# Patient Record
Sex: FEMALE | Race: WHITE | NOT HISPANIC OR LATINO | Employment: OTHER | URBAN - METROPOLITAN AREA
[De-identification: names, ages, dates, MRNs, and addresses within clinical notes are randomized per-mention and may not be internally consistent; named-entity substitution may affect disease eponyms.]

---

## 2018-07-16 ENCOUNTER — INITIAL CONSULT (OUTPATIENT)
Dept: OPHTHALMOLOGY | Facility: CLINIC | Age: 68
End: 2018-07-16
Payer: MEDICARE

## 2018-07-16 DIAGNOSIS — Z94.7 TRANSPLANTED CORNEA: ICD-10-CM

## 2018-07-16 DIAGNOSIS — Q15.0 CONGENITAL GLAUCOMA OF BOTH EYES: Primary | ICD-10-CM

## 2018-07-16 DIAGNOSIS — Z97.0 EYE GLOBE PROSTHESIS: ICD-10-CM

## 2018-07-16 DIAGNOSIS — Z98.41 STATUS POST CATARACT EXTRACTION AND INSERTION OF INTRAOCULAR LENS OF RIGHT EYE: ICD-10-CM

## 2018-07-16 DIAGNOSIS — Z96.1 STATUS POST CATARACT EXTRACTION AND INSERTION OF INTRAOCULAR LENS OF RIGHT EYE: ICD-10-CM

## 2018-07-16 PROCEDURE — 76514 ECHO EXAM OF EYE THICKNESS: CPT | Mod: S$GLB,,, | Performed by: OPHTHALMOLOGY

## 2018-07-16 PROCEDURE — 92004 COMPRE OPH EXAM NEW PT 1/>: CPT | Mod: S$GLB,,, | Performed by: OPHTHALMOLOGY

## 2018-07-16 PROCEDURE — 92020 GONIOSCOPY: CPT | Mod: S$GLB,,, | Performed by: OPHTHALMOLOGY

## 2018-07-16 NOTE — PROGRESS NOTES
HPI     67 year old female  Pt recently moved from [place] and needs to establish care  Juvenile onset glaucoma  Bleb OD   Corneal transplant OD   IOL OD   Prosthesis OS-        Last edited by Rosanne Ornelas on 2018  2:40 PM. (History)            Assessment /Plan     For exam results, see Encounter Report.    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

## 2018-07-20 NOTE — PROGRESS NOTES
Assessment /Plan     For exam results, see Encounter Report.    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

## 2018-08-01 ENCOUNTER — OFFICE VISIT (OUTPATIENT)
Dept: OPHTHALMOLOGY | Facility: CLINIC | Age: 68
End: 2018-08-01
Payer: MEDICARE

## 2018-08-01 DIAGNOSIS — Z96.1 STATUS POST CATARACT EXTRACTION AND INSERTION OF INTRAOCULAR LENS OF RIGHT EYE: ICD-10-CM

## 2018-08-01 DIAGNOSIS — Z94.7 TRANSPLANTED CORNEA: ICD-10-CM

## 2018-08-01 DIAGNOSIS — Z97.0 EYE GLOBE PROSTHESIS: ICD-10-CM

## 2018-08-01 DIAGNOSIS — Z98.41 STATUS POST CATARACT EXTRACTION AND INSERTION OF INTRAOCULAR LENS OF RIGHT EYE: ICD-10-CM

## 2018-08-01 DIAGNOSIS — Q15.0 CONGENITAL GLAUCOMA OF BOTH EYES: Primary | ICD-10-CM

## 2018-08-01 PROCEDURE — 92012 INTRM OPH EXAM EST PATIENT: CPT | Mod: S$GLB,,, | Performed by: OPHTHALMOLOGY

## 2018-08-01 PROCEDURE — 99999 PR PBB SHADOW E&M-EST. PATIENT-LVL II: CPT | Mod: PBBFAC,,, | Performed by: OPHTHALMOLOGY

## 2018-08-01 RX ORDER — ACETAZOLAMIDE 250 MG/1
250 TABLET ORAL 4 TIMES DAILY
COMMUNITY
End: 2018-08-01 | Stop reason: SDUPTHER

## 2018-08-01 RX ORDER — ACETAZOLAMIDE 250 MG/1
250 TABLET ORAL 4 TIMES DAILY
Qty: 120 TABLET | Refills: 11 | Status: SHIPPED | OUTPATIENT
Start: 2018-08-01 | End: 2019-12-04

## 2018-08-01 RX ORDER — POTASSIUM CHLORIDE 750 MG/1
TABLET, EXTENDED RELEASE ORAL
COMMUNITY
Start: 2018-04-24 | End: 2019-05-29 | Stop reason: SDUPTHER

## 2018-08-07 NOTE — PROGRESS NOTES
Assessment /Plan     For exam results, see Encounter Report.    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

## 2018-08-20 ENCOUNTER — OFFICE VISIT (OUTPATIENT)
Dept: OPHTHALMOLOGY | Facility: CLINIC | Age: 68
End: 2018-08-20
Payer: MEDICARE

## 2018-08-20 DIAGNOSIS — Q15.0 CONGENITAL GLAUCOMA OF BOTH EYES: Primary | ICD-10-CM

## 2018-08-20 DIAGNOSIS — Z96.1 STATUS POST CATARACT EXTRACTION AND INSERTION OF INTRAOCULAR LENS OF RIGHT EYE: ICD-10-CM

## 2018-08-20 DIAGNOSIS — Z98.41 STATUS POST CATARACT EXTRACTION AND INSERTION OF INTRAOCULAR LENS OF RIGHT EYE: ICD-10-CM

## 2018-08-20 DIAGNOSIS — H40.1113 PRIMARY OPEN ANGLE GLAUCOMA (POAG) OF RIGHT EYE, SEVERE STAGE: Primary | ICD-10-CM

## 2018-08-20 DIAGNOSIS — Z94.7 TRANSPLANTED CORNEA: ICD-10-CM

## 2018-08-20 DIAGNOSIS — Z97.0 EYE GLOBE PROSTHESIS: ICD-10-CM

## 2018-08-20 PROCEDURE — 92012 INTRM OPH EXAM EST PATIENT: CPT | Mod: S$GLB,,, | Performed by: OPHTHALMOLOGY

## 2018-08-20 PROCEDURE — 99999 PR PBB SHADOW E&M-EST. PATIENT-LVL I: CPT | Mod: PBBFAC,,, | Performed by: OPHTHALMOLOGY

## 2018-08-20 RX ORDER — PILOCARPINE HYDROCHLORIDE 40 MG/ML
1 SOLUTION/ DROPS OPHTHALMIC 3 TIMES DAILY
Qty: 15 ML | Refills: 5 | Status: SHIPPED | OUTPATIENT
Start: 2018-08-20 | End: 2019-12-04

## 2018-08-20 RX ORDER — BRIMONIDINE TARTRATE 1 MG/ML
1 SOLUTION/ DROPS OPHTHALMIC 3 TIMES DAILY
Qty: 10 ML | Refills: 5 | Status: SHIPPED | OUTPATIENT
Start: 2018-08-20 | End: 2018-10-15

## 2018-08-20 RX ORDER — DORZOLAMIDE HYDROCHLORIDE AND TIMOLOL MALEATE 20; 5 MG/ML; MG/ML
1 SOLUTION/ DROPS OPHTHALMIC 2 TIMES DAILY
Qty: 10 ML | Refills: 5 | Status: SHIPPED | OUTPATIENT
Start: 2018-08-20 | End: 2019-09-23 | Stop reason: SDUPTHER

## 2018-08-20 RX ORDER — TRAVOPROST OPHTHALMIC SOLUTION 0.04 MG/ML
1 SOLUTION OPHTHALMIC NIGHTLY
Qty: 2.5 ML | Refills: 5 | Status: SHIPPED | OUTPATIENT
Start: 2018-08-20 | End: 2019-08-22 | Stop reason: SDUPTHER

## 2018-08-20 RX ORDER — FLUOROMETHOLONE 1 MG/ML
1 SUSPENSION/ DROPS OPHTHALMIC DAILY
Qty: 10 ML | Refills: 5 | Status: SHIPPED | OUTPATIENT
Start: 2018-08-20 | End: 2019-08-22 | Stop reason: SDUPTHER

## 2018-10-15 ENCOUNTER — OFFICE VISIT (OUTPATIENT)
Dept: OPHTHALMOLOGY | Facility: CLINIC | Age: 68
End: 2018-10-15
Payer: MEDICARE

## 2018-10-15 DIAGNOSIS — Z96.1 STATUS POST CATARACT EXTRACTION AND INSERTION OF INTRAOCULAR LENS OF RIGHT EYE: ICD-10-CM

## 2018-10-15 DIAGNOSIS — Z98.41 STATUS POST CATARACT EXTRACTION AND INSERTION OF INTRAOCULAR LENS OF RIGHT EYE: ICD-10-CM

## 2018-10-15 DIAGNOSIS — H40.1113 PRIMARY OPEN ANGLE GLAUCOMA (POAG) OF RIGHT EYE, SEVERE STAGE: ICD-10-CM

## 2018-10-15 DIAGNOSIS — Q15.0 CONGENITAL GLAUCOMA OF BOTH EYES: ICD-10-CM

## 2018-10-15 DIAGNOSIS — Z94.7 TRANSPLANTED CORNEA: ICD-10-CM

## 2018-10-15 DIAGNOSIS — Z97.0 EYE GLOBE PROSTHESIS: ICD-10-CM

## 2018-10-15 DIAGNOSIS — H40.31X3 GLAUCOMA OF RIGHT EYE ASSOCIATED WITH OCULAR TRAUMA, SEVERE STAGE: Primary | ICD-10-CM

## 2018-10-15 PROCEDURE — 99999 PR PBB SHADOW E&M-EST. PATIENT-LVL I: CPT | Mod: PBBFAC,,, | Performed by: OPHTHALMOLOGY

## 2018-10-15 PROCEDURE — 92012 INTRM OPH EXAM EST PATIENT: CPT | Mod: S$PBB,,, | Performed by: OPHTHALMOLOGY

## 2018-10-15 PROCEDURE — 99211 OFF/OP EST MAY X REQ PHY/QHP: CPT | Mod: PBBFAC,PO | Performed by: OPHTHALMOLOGY

## 2018-10-15 RX ORDER — BRIMONIDINE TARTRATE 1 MG/ML
1 SOLUTION/ DROPS OPHTHALMIC 3 TIMES DAILY
Qty: 45 ML | Refills: 11 | Status: SHIPPED | OUTPATIENT
Start: 2018-10-15 | End: 2020-01-20

## 2018-10-15 NOTE — PROGRESS NOTES
Assessment /Plan     For exam results, see Encounter Report.    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

## 2018-10-24 ENCOUNTER — TELEPHONE (OUTPATIENT)
Dept: OPHTHALMOLOGY | Facility: CLINIC | Age: 68
End: 2018-10-24

## 2018-10-24 NOTE — TELEPHONE ENCOUNTER
----- Message from Karissa Bean sent at 10/24/2018  9:50 AM CDT -----  Contact: Yahaira Hinson   Pt would like to speak with  nurse about the eye drop prescription,pt can be reached at [phone] please thank you.

## 2018-10-24 NOTE — TELEPHONE ENCOUNTER
Pt uses drops TID- informed pharmacy of change- pt will need override for next rx.   Informed pt to call for RX a few days prior to needing her drops and they will get the override.

## 2019-01-21 ENCOUNTER — OFFICE VISIT (OUTPATIENT)
Dept: OPHTHALMOLOGY | Facility: CLINIC | Age: 69
End: 2019-01-21
Payer: MEDICARE

## 2019-01-21 DIAGNOSIS — Q15.0 CONGENITAL GLAUCOMA OF BOTH EYES: ICD-10-CM

## 2019-01-21 DIAGNOSIS — Z98.41 STATUS POST CATARACT EXTRACTION AND INSERTION OF INTRAOCULAR LENS OF RIGHT EYE: ICD-10-CM

## 2019-01-21 DIAGNOSIS — Z94.7 TRANSPLANTED CORNEA: ICD-10-CM

## 2019-01-21 DIAGNOSIS — H40.31X3 GLAUCOMA OF RIGHT EYE ASSOCIATED WITH OCULAR TRAUMA, SEVERE STAGE: Primary | ICD-10-CM

## 2019-01-21 DIAGNOSIS — Z96.1 STATUS POST CATARACT EXTRACTION AND INSERTION OF INTRAOCULAR LENS OF RIGHT EYE: ICD-10-CM

## 2019-01-21 DIAGNOSIS — Z97.0 EYE GLOBE PROSTHESIS: ICD-10-CM

## 2019-01-21 PROCEDURE — 92012 INTRM OPH EXAM EST PATIENT: CPT | Mod: S$GLB,,, | Performed by: OPHTHALMOLOGY

## 2019-01-21 PROCEDURE — 99999 PR PBB SHADOW E&M-EST. PATIENT-LVL II: ICD-10-PCS | Mod: PBBFAC,,, | Performed by: OPHTHALMOLOGY

## 2019-01-21 PROCEDURE — 99999 PR PBB SHADOW E&M-EST. PATIENT-LVL II: CPT | Mod: PBBFAC,,, | Performed by: OPHTHALMOLOGY

## 2019-01-21 PROCEDURE — 92012 PR EYE EXAM, EST PATIENT,INTERMED: ICD-10-PCS | Mod: S$GLB,,, | Performed by: OPHTHALMOLOGY

## 2019-01-21 NOTE — PROGRESS NOTES
Assessment /Plan     For exam results, see Encounter Report.    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

## 2019-04-15 ENCOUNTER — OFFICE VISIT (OUTPATIENT)
Dept: OPHTHALMOLOGY | Facility: CLINIC | Age: 69
End: 2019-04-15
Payer: MEDICARE

## 2019-04-15 DIAGNOSIS — Q15.0 CONGENITAL GLAUCOMA OF BOTH EYES: ICD-10-CM

## 2019-04-15 DIAGNOSIS — Z97.0 EYE GLOBE PROSTHESIS: ICD-10-CM

## 2019-04-15 DIAGNOSIS — Z96.1 STATUS POST CATARACT EXTRACTION AND INSERTION OF INTRAOCULAR LENS OF RIGHT EYE: ICD-10-CM

## 2019-04-15 DIAGNOSIS — Z98.41 STATUS POST CATARACT EXTRACTION AND INSERTION OF INTRAOCULAR LENS OF RIGHT EYE: ICD-10-CM

## 2019-04-15 DIAGNOSIS — Z94.7 TRANSPLANTED CORNEA: ICD-10-CM

## 2019-04-15 DIAGNOSIS — H40.31X3 GLAUCOMA OF RIGHT EYE ASSOCIATED WITH OCULAR TRAUMA, SEVERE STAGE: Primary | ICD-10-CM

## 2019-04-15 PROCEDURE — 99999 PR PBB SHADOW E&M-EST. PATIENT-LVL I: CPT | Mod: PBBFAC,,, | Performed by: OPHTHALMOLOGY

## 2019-04-15 PROCEDURE — 99999 PR PBB SHADOW E&M-EST. PATIENT-LVL I: ICD-10-PCS | Mod: PBBFAC,,, | Performed by: OPHTHALMOLOGY

## 2019-04-15 PROCEDURE — 92012 INTRM OPH EXAM EST PATIENT: CPT | Mod: S$GLB,,, | Performed by: OPHTHALMOLOGY

## 2019-04-15 PROCEDURE — 92012 PR EYE EXAM, EST PATIENT,INTERMED: ICD-10-PCS | Mod: S$GLB,,, | Performed by: OPHTHALMOLOGY

## 2019-04-15 RX ORDER — AMOXICILLIN 875 MG/1
TABLET, FILM COATED ORAL
Refills: 0 | COMMUNITY
Start: 2019-01-26 | End: 2019-04-29

## 2019-04-15 RX ORDER — CHLOPHEDIANOL HYDROCHLORIDE, DEXBROMPHENIRAMINE MALEATE 12.5; 1 MG/5ML; MG/5ML
LIQUID ORAL
Refills: 0 | COMMUNITY
Start: 2019-01-26 | End: 2019-04-29

## 2019-04-15 NOTE — PROGRESS NOTES
Assessment /Plan     For exam results, see Encounter Report.    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

## 2019-04-18 NOTE — PROGRESS NOTES
Assessment /Plan     For exam results, see Encounter Report.    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    Traumatic Glaucoma OD  Congenital Glaucoma  Prosthesis OS    Mid-low teens    CCT  530    Right --> patient recipe  Cosopt TID  Alphagan TID  Giovani TID   Beni q day  FML 5 days / week     Diamox --> Intol Hypo K --> now on K suppl -->  D 250 BID --> MTMT  Tolerated well in past     --> re-consider M laser or IN tube OD as re-discussed  --> used eye model for IN  :: Held  --> patient tending towards MP3 with IOP in teens / low 20's  --> if IOP Mid - Hi 20's --> reconsider     PC IOL OD  Quiet    Monocular precautions      Plan  RTC 2 months IOP --> WB  RTC sooner prn with good understanding

## 2019-04-29 ENCOUNTER — OFFICE VISIT (OUTPATIENT)
Dept: OPHTHALMOLOGY | Facility: CLINIC | Age: 69
End: 2019-04-29
Payer: MEDICARE

## 2019-04-29 DIAGNOSIS — Z94.7 TRANSPLANTED CORNEA: ICD-10-CM

## 2019-04-29 DIAGNOSIS — Z97.0 EYE GLOBE PROSTHESIS: ICD-10-CM

## 2019-04-29 DIAGNOSIS — Q15.0 CONGENITAL GLAUCOMA OF BOTH EYES: ICD-10-CM

## 2019-04-29 DIAGNOSIS — Z96.1 STATUS POST CATARACT EXTRACTION AND INSERTION OF INTRAOCULAR LENS OF RIGHT EYE: ICD-10-CM

## 2019-04-29 DIAGNOSIS — H40.31X3 GLAUCOMA OF RIGHT EYE ASSOCIATED WITH OCULAR TRAUMA, SEVERE STAGE: Primary | ICD-10-CM

## 2019-04-29 DIAGNOSIS — Z98.41 STATUS POST CATARACT EXTRACTION AND INSERTION OF INTRAOCULAR LENS OF RIGHT EYE: ICD-10-CM

## 2019-04-29 PROCEDURE — 92012 PR EYE EXAM, EST PATIENT,INTERMED: ICD-10-PCS | Mod: S$GLB,,, | Performed by: OPHTHALMOLOGY

## 2019-04-29 PROCEDURE — 99999 PR PBB SHADOW E&M-EST. PATIENT-LVL III: CPT | Mod: PBBFAC,,, | Performed by: OPHTHALMOLOGY

## 2019-04-29 PROCEDURE — 99999 PR PBB SHADOW E&M-EST. PATIENT-LVL III: ICD-10-PCS | Mod: PBBFAC,,, | Performed by: OPHTHALMOLOGY

## 2019-04-29 PROCEDURE — 92012 INTRM OPH EXAM EST PATIENT: CPT | Mod: S$GLB,,, | Performed by: OPHTHALMOLOGY

## 2019-05-28 NOTE — H&P (VIEW-ONLY)
This note was created by combination of typed  and Dragon dictation.  Transcription errors may be present.  If there are any questions, please contact me.    Assessment / Plan:   Normal physical exam  -ROSALEE for colonoscopy, ROSALEE to previous PCP for immunizations. ROSALEE to GI for hx of HCV testing  -     Comprehensive metabolic panel; Future; Expected date: 05/29/2019  -     Lipid panel; Future; Expected date: 05/29/2019    Pure hypercholesterolemia  -check labs on lipitor 10.  -     Comprehensive metabolic panel; Future; Expected date: 05/29/2019  -     Lipid panel; Future; Expected date: 05/29/2019  -     atorvastatin (LIPITOR) 10 MG tablet; Take 1 tablet (10 mg total) by mouth once daily.  Dispense: 90 tablet; Refill: 3    Essential hypertension  -stable on tenoretic and K Dur. Not requesting RF today.  -     atenolol-chlorthalidone (TENORETIC) 50-25 mg Tab; Take 1 tablet by mouth once daily.  Dispense: 90 tablet; Refill: 3  -     potassium chloride (KLOR-CON) 10 MEQ TbSR; Take 1 tablet (10 mEq total) by mouth once daily.  Dispense: 90 tablet; Refill: 3    Status post cholecystectomy 1998 still gets bile sludge in the bile duct; Dr. Ca  -ROSALEE for colonoscopy, HCV testing/screening    Thalassemia, unspecified type  -CBC done 9/2018 outside records. Not repeated today.    Medications Discontinued During This Encounter   Medication Reason    atenolol-chlorthalidone (TENORETIC) 50-25 mg Tab Reorder    atorvastatin (LIPITOR) 10 MG tablet Reorder    potassium chloride (KLOR-CON) 10 MEQ TbSR Reorder       meds sent this encounter:  Medications Ordered This Encounter   Medications    atenolol-chlorthalidone (TENORETIC) 50-25 mg Tab     Sig: Take 1 tablet by mouth once daily.     Dispense:  90 tablet     Refill:  3    atorvastatin (LIPITOR) 10 MG tablet     Sig: Take 1 tablet (10 mg total) by mouth once daily.     Dispense:  90 tablet     Refill:  3    potassium chloride (KLOR-CON) 10 MEQ TbSR     Sig: Take 1  tablet (10 mEq total) by mouth once daily.     Dispense:  90 tablet     Refill:  3       Follow Up: No follow-ups on file. If labs good PEx 1 year. If change statin dose repeat labs 6 months lab only.      Subjective:     Chief Complaint   Patient presents with    Annual Exam       HPI  Mariela is a 68 y.o. female, last appointment with this clinic was Visit date not found.    No LMP recorded. Patient is postmenopausal.    New patient formerly Dr. Ziegler  Gyn Dr. Ralph  Glaucoma Dr. Beaver    Hypertension  Hyperlipidemia; labs outside done 9/2018 was high she does not think she was taking statin at that time. Has been taking her statin since. Low dose statin.  Hx of taty but still gets bile duct sludge.  Dr. Ca.  Colonoscopy age 61 reportedly diverticulosis.  Hx of thalassemia - unsure if alpha or beta.  Thinks she had HCV screening as part of initial workup that ultimately diagnosed the bile duct sludge    10/15/2018 metabolic panel ALT 36 creatinine 1.09  CBC 4.8/11.2/35.0/233 MCV 66 RDW 15  09/22/2018   HDL 87   A1c 5.4  Urinalysis unremarkable  11/2015 DXA normal BMD    Patient Care Team:  Raul Adkins MD as PCP - General (Internal Medicine)  Temi Ziegler MD as Consulting Physician (Internal Medicine)  Doroteo Beaver MD as Consulting Physician (Ophthalmology)  Jeremias Ca MD as Consulting Physician (Gastroenterology)  Georgie Ralph MD as Obstetrician (Obstetrics and Gynecology)    Patient Active Problem List    Diagnosis Date Noted    Pure hypercholesterolemia 05/29/2019    Essential hypertension 05/29/2019    Status post cholecystectomy 1998 still gets bile sludge in the bile duct; Dr. 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/16/2018     Left eye      Status post cataract extraction and insertion  of intraocular lens of right eye 2018    Transplanted cornea 2018     Right eye         PAST MEDICAL HISTORY:  Past Medical History:   Diagnosis Date    Cataract     Glaucoma (increased eye pressure)     Hypertension        PAST SURGICAL HISTORY:  Past Surgical History:   Procedure Laterality Date    CATARACT EXTRACTION Right     about     CHOLECYSTECTOMY      DC REMOVAL OF OVARY/TUBE(S)       Family History   Problem Relation Age of Onset    Asthma Father     Arthritis Father     Ovarian cancer Mother     Hypertension Sister     No Known Problems Brother     Breast cancer Maternal Grandmother     No Known Problems Brother     No Known Problems Brother     Colon cancer Neg Hx        SOCIAL HISTORY:  Social History     Socioeconomic History    Marital status:      Spouse name: Not on file    Number of children: Not on file    Years of education: Not on file    Highest education level: Not on file   Occupational History    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   Social Needs    Financial resource strain: Not on file    Food insecurity:     Worry: Not on file     Inability: Not on file    Transportation needs:     Medical: Not on file     Non-medical: Not on file   Tobacco Use    Smoking status: Former Smoker     Packs/day: 0.30     Years: 11.00     Pack years: 3.30     Types: Cigarettes     Last attempt to quit: 2007     Years since quittin.8    Smokeless tobacco: Never Used   Substance and Sexual Activity    Alcohol use: Yes     Binge frequency: Weekly     Comment: social drinker    Drug use: No    Sexual activity: Yes     Partners: Male     Birth control/protection: Post-menopausal   Lifestyle    Physical activity:     Days per week: Not on file     Minutes per session: Not on file    Stress: Not on file   Relationships    Social connections:     Talks on phone: Not on file     Gets together:  Not on file     Attends Jew service: Not on file     Active member of club or organization: Not on file     Attends meetings of clubs or organizations: Not on file     Relationship status: Not on file   Other Topics Concern    Not on file   Social History Narrative    Not on file        ALLERGIES AND MEDICATIONS: updated and reviewed.  Review of patient's allergies indicates:  No Known Allergies  Current Outpatient Medications   Medication Sig Dispense Refill    acetaZOLAMIDE (DIAMOX) 250 MG tablet Take 1 tablet (250 mg total) by mouth 4 (four) times daily. 120 tablet 11    atenolol-chlorthalidone (TENORETIC) 50-25 mg Tab Take 1 tablet by mouth once daily. 90 tablet 3    atorvastatin (LIPITOR) 10 MG tablet Take 1 tablet (10 mg total) by mouth once daily. 90 tablet 3    brimonidine 0.1% (ALPHAGAN P) 0.1 % Drop Place 1 drop into the right eye 3 (three) times daily. 45 mL 11    dorzolamide-timolol 2-0.5% (COSOPT) 22.3-6.8 mg/mL ophthalmic solution Place 1 drop into the right eye 2 (two) times daily. 10 mL 5    fluorometholone 0.1% (FML) 0.1 % DrpS Place 1 drop into the right eye once daily. 10 mL 5    pilocarpine HCL 4% (PILOCAR) 4 % ophthalmic solution Place 1 drop into the right eye 3 (three) times daily. 15 mL 5    potassium chloride (KLOR-CON) 10 MEQ TbSR Take 1 tablet (10 mEq total) by mouth once daily. 90 tablet 3    travoprost (TRAVATAN Z) 0.004 % ophthalmic solution Place 1 drop into the right eye every evening. 2.5 mL 5     No current facility-administered medications for this visit.        Review of Systems   Constitutional: Negative for fever, malaise/fatigue and weight loss.   HENT: Negative for congestion.    Eyes: Negative for blurred vision and pain.   Respiratory: Negative for shortness of breath and wheezing.    Cardiovascular: Negative for chest pain, palpitations and leg swelling.   Gastrointestinal: Negative for abdominal pain, blood in stool, constipation, diarrhea and heartburn.  "  Genitourinary: Negative for dysuria, hematuria and urgency.   Musculoskeletal: Negative for joint pain.   Neurological: Negative for tingling, focal weakness, weakness and headaches.       Objective:   Physical Exam   Vitals:    05/29/19 0823   BP: 130/66   BP Location: Left arm   Patient Position: Sitting   BP Method: Medium (Manual)   Pulse: 66   Temp: 97.9 °F (36.6 °C)   TempSrc: Oral   SpO2: 99%   Weight: 66.2 kg (146 lb)   Height: 5' 2" (1.575 m)    Body mass index is 26.7 kg/m².  Weight: 66.2 kg (146 lb)   Height: 5' 2" (157.5 cm)     Physical Exam   Constitutional: She is oriented to person, place, and time. She appears well-developed and well-nourished.   HENT:   Right Ear: Tympanic membrane, external ear and ear canal normal.   Left Ear: Tympanic membrane, external ear and ear canal normal.   Mouth/Throat: Oropharynx is clear and moist.   Eyes: No scleral icterus.   Left eye prosthesis   Neck: Neck supple. No thyromegaly present.   Cardiovascular: Normal rate, regular rhythm and normal heart sounds.   No murmur heard.  Pulmonary/Chest: Effort normal and breath sounds normal. She has no wheezes.   Abdominal: Soft. She exhibits no mass. There is no splenomegaly or hepatomegaly. There is no tenderness.   Musculoskeletal: Normal range of motion. She exhibits no edema or deformity.   Lymphadenopathy:     She has no cervical adenopathy.   Neurological: She is alert and oriented to person, place, and time. She has normal reflexes.   Skin: Skin is warm and dry. No rash noted.   On exposed skin   Psychiatric: She has a normal mood and affect. Her behavior is normal. Judgment and thought content normal.     "

## 2019-05-28 NOTE — PROGRESS NOTES
This note was created by combination of typed  and Dragon dictation.  Transcription errors may be present.  If there are any questions, please contact me.    Assessment / Plan:   Normal physical exam  -ROSALEE for colonoscopy, ROSALEE to previous PCP for immunizations. ROSALEE to GI for hx of HCV testing  -     Comprehensive metabolic panel; Future; Expected date: 05/29/2019  -     Lipid panel; Future; Expected date: 05/29/2019    Pure hypercholesterolemia  -check labs on lipitor 10.  -     Comprehensive metabolic panel; Future; Expected date: 05/29/2019  -     Lipid panel; Future; Expected date: 05/29/2019  -     atorvastatin (LIPITOR) 10 MG tablet; Take 1 tablet (10 mg total) by mouth once daily.  Dispense: 90 tablet; Refill: 3    Essential hypertension  -stable on tenoretic and K Dur. Not requesting RF today.  -     atenolol-chlorthalidone (TENORETIC) 50-25 mg Tab; Take 1 tablet by mouth once daily.  Dispense: 90 tablet; Refill: 3  -     potassium chloride (KLOR-CON) 10 MEQ TbSR; Take 1 tablet (10 mEq total) by mouth once daily.  Dispense: 90 tablet; Refill: 3    Status post cholecystectomy 1998 still gets bile sludge in the bile duct; Dr. Ca  -ROSALEE for colonoscopy, HCV testing/screening    Thalassemia, unspecified type  -CBC done 9/2018 outside records. Not repeated today.    Medications Discontinued During This Encounter   Medication Reason    atenolol-chlorthalidone (TENORETIC) 50-25 mg Tab Reorder    atorvastatin (LIPITOR) 10 MG tablet Reorder    potassium chloride (KLOR-CON) 10 MEQ TbSR Reorder       meds sent this encounter:  Medications Ordered This Encounter   Medications    atenolol-chlorthalidone (TENORETIC) 50-25 mg Tab     Sig: Take 1 tablet by mouth once daily.     Dispense:  90 tablet     Refill:  3    atorvastatin (LIPITOR) 10 MG tablet     Sig: Take 1 tablet (10 mg total) by mouth once daily.     Dispense:  90 tablet     Refill:  3    potassium chloride (KLOR-CON) 10 MEQ TbSR     Sig: Take 1  tablet (10 mEq total) by mouth once daily.     Dispense:  90 tablet     Refill:  3       Follow Up: No follow-ups on file. If labs good PEx 1 year. If change statin dose repeat labs 6 months lab only.      Subjective:     Chief Complaint   Patient presents with    Annual Exam       HPI  Mariela is a 68 y.o. female, last appointment with this clinic was Visit date not found.    No LMP recorded. Patient is postmenopausal.    New patient formerly Dr. Ziegler  Gyn Dr. Ralph  Glaucoma Dr. Beaver    Hypertension  Hyperlipidemia; labs outside done 9/2018 was high she does not think she was taking statin at that time. Has been taking her statin since. Low dose statin.  Hx of taty but still gets bile duct sludge.  Dr. Ca.  Colonoscopy age 61 reportedly diverticulosis.  Hx of thalassemia - unsure if alpha or beta.  Thinks she had HCV screening as part of initial workup that ultimately diagnosed the bile duct sludge    10/15/2018 metabolic panel ALT 36 creatinine 1.09  CBC 4.8/11.2/35.0/233 MCV 66 RDW 15  09/22/2018   HDL 87   A1c 5.4  Urinalysis unremarkable  11/2015 DXA normal BMD    Patient Care Team:  Raul Adkins MD as PCP - General (Internal Medicine)  Temi Ziegler MD as Consulting Physician (Internal Medicine)  Doroteo Beaver MD as Consulting Physician (Ophthalmology)  Jeremias Ca MD as Consulting Physician (Gastroenterology)  Georgie Ralph MD as Obstetrician (Obstetrics and Gynecology)    Patient Active Problem List    Diagnosis Date Noted    Pure hypercholesterolemia 05/29/2019    Essential hypertension 05/29/2019    Status post cholecystectomy 1998 still gets bile sludge in the bile duct; Dr. 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/16/2018     Left eye      Status post cataract extraction and insertion  of intraocular lens of right eye 2018    Transplanted cornea 2018     Right eye         PAST MEDICAL HISTORY:  Past Medical History:   Diagnosis Date    Cataract     Glaucoma (increased eye pressure)     Hypertension        PAST SURGICAL HISTORY:  Past Surgical History:   Procedure Laterality Date    CATARACT EXTRACTION Right     about     CHOLECYSTECTOMY      TN REMOVAL OF OVARY/TUBE(S)       Family History   Problem Relation Age of Onset    Asthma Father     Arthritis Father     Ovarian cancer Mother     Hypertension Sister     No Known Problems Brother     Breast cancer Maternal Grandmother     No Known Problems Brother     No Known Problems Brother     Colon cancer Neg Hx        SOCIAL HISTORY:  Social History     Socioeconomic History    Marital status:      Spouse name: Not on file    Number of children: Not on file    Years of education: Not on file    Highest education level: Not on file   Occupational History    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   Social Needs    Financial resource strain: Not on file    Food insecurity:     Worry: Not on file     Inability: Not on file    Transportation needs:     Medical: Not on file     Non-medical: Not on file   Tobacco Use    Smoking status: Former Smoker     Packs/day: 0.30     Years: 11.00     Pack years: 3.30     Types: Cigarettes     Last attempt to quit: 2007     Years since quittin.8    Smokeless tobacco: Never Used   Substance and Sexual Activity    Alcohol use: Yes     Binge frequency: Weekly     Comment: social drinker    Drug use: No    Sexual activity: Yes     Partners: Male     Birth control/protection: Post-menopausal   Lifestyle    Physical activity:     Days per week: Not on file     Minutes per session: Not on file    Stress: Not on file   Relationships    Social connections:     Talks on phone: Not on file     Gets together:  Not on file     Attends Latter-day service: Not on file     Active member of club or organization: Not on file     Attends meetings of clubs or organizations: Not on file     Relationship status: Not on file   Other Topics Concern    Not on file   Social History Narrative    Not on file        ALLERGIES AND MEDICATIONS: updated and reviewed.  Review of patient's allergies indicates:  No Known Allergies  Current Outpatient Medications   Medication Sig Dispense Refill    acetaZOLAMIDE (DIAMOX) 250 MG tablet Take 1 tablet (250 mg total) by mouth 4 (four) times daily. 120 tablet 11    atenolol-chlorthalidone (TENORETIC) 50-25 mg Tab Take 1 tablet by mouth once daily. 90 tablet 3    atorvastatin (LIPITOR) 10 MG tablet Take 1 tablet (10 mg total) by mouth once daily. 90 tablet 3    brimonidine 0.1% (ALPHAGAN P) 0.1 % Drop Place 1 drop into the right eye 3 (three) times daily. 45 mL 11    dorzolamide-timolol 2-0.5% (COSOPT) 22.3-6.8 mg/mL ophthalmic solution Place 1 drop into the right eye 2 (two) times daily. 10 mL 5    fluorometholone 0.1% (FML) 0.1 % DrpS Place 1 drop into the right eye once daily. 10 mL 5    pilocarpine HCL 4% (PILOCAR) 4 % ophthalmic solution Place 1 drop into the right eye 3 (three) times daily. 15 mL 5    potassium chloride (KLOR-CON) 10 MEQ TbSR Take 1 tablet (10 mEq total) by mouth once daily. 90 tablet 3    travoprost (TRAVATAN Z) 0.004 % ophthalmic solution Place 1 drop into the right eye every evening. 2.5 mL 5     No current facility-administered medications for this visit.        Review of Systems   Constitutional: Negative for fever, malaise/fatigue and weight loss.   HENT: Negative for congestion.    Eyes: Negative for blurred vision and pain.   Respiratory: Negative for shortness of breath and wheezing.    Cardiovascular: Negative for chest pain, palpitations and leg swelling.   Gastrointestinal: Negative for abdominal pain, blood in stool, constipation, diarrhea and heartburn.  "  Genitourinary: Negative for dysuria, hematuria and urgency.   Musculoskeletal: Negative for joint pain.   Neurological: Negative for tingling, focal weakness, weakness and headaches.       Objective:   Physical Exam   Vitals:    05/29/19 0823   BP: 130/66   BP Location: Left arm   Patient Position: Sitting   BP Method: Medium (Manual)   Pulse: 66   Temp: 97.9 °F (36.6 °C)   TempSrc: Oral   SpO2: 99%   Weight: 66.2 kg (146 lb)   Height: 5' 2" (1.575 m)    Body mass index is 26.7 kg/m².  Weight: 66.2 kg (146 lb)   Height: 5' 2" (157.5 cm)     Physical Exam   Constitutional: She is oriented to person, place, and time. She appears well-developed and well-nourished.   HENT:   Right Ear: Tympanic membrane, external ear and ear canal normal.   Left Ear: Tympanic membrane, external ear and ear canal normal.   Mouth/Throat: Oropharynx is clear and moist.   Eyes: No scleral icterus.   Left eye prosthesis   Neck: Neck supple. No thyromegaly present.   Cardiovascular: Normal rate, regular rhythm and normal heart sounds.   No murmur heard.  Pulmonary/Chest: Effort normal and breath sounds normal. She has no wheezes.   Abdominal: Soft. She exhibits no mass. There is no splenomegaly or hepatomegaly. There is no tenderness.   Musculoskeletal: Normal range of motion. She exhibits no edema or deformity.   Lymphadenopathy:     She has no cervical adenopathy.   Neurological: She is alert and oriented to person, place, and time. She has normal reflexes.   Skin: Skin is warm and dry. No rash noted.   On exposed skin   Psychiatric: She has a normal mood and affect. Her behavior is normal. Judgment and thought content normal.     "

## 2019-05-29 ENCOUNTER — LAB VISIT (OUTPATIENT)
Dept: LAB | Facility: HOSPITAL | Age: 69
End: 2019-05-29
Attending: INTERNAL MEDICINE
Payer: MEDICARE

## 2019-05-29 ENCOUNTER — OFFICE VISIT (OUTPATIENT)
Dept: FAMILY MEDICINE | Facility: CLINIC | Age: 69
End: 2019-05-29
Payer: MEDICARE

## 2019-05-29 VITALS
BODY MASS INDEX: 26.87 KG/M2 | HEART RATE: 66 BPM | DIASTOLIC BLOOD PRESSURE: 66 MMHG | WEIGHT: 146 LBS | HEIGHT: 62 IN | TEMPERATURE: 98 F | SYSTOLIC BLOOD PRESSURE: 130 MMHG | OXYGEN SATURATION: 99 %

## 2019-05-29 DIAGNOSIS — E78.00 PURE HYPERCHOLESTEROLEMIA: ICD-10-CM

## 2019-05-29 DIAGNOSIS — D56.9 THALASSEMIA, UNSPECIFIED TYPE: ICD-10-CM

## 2019-05-29 DIAGNOSIS — I10 ESSENTIAL HYPERTENSION: ICD-10-CM

## 2019-05-29 DIAGNOSIS — Z00.00 NORMAL PHYSICAL EXAM: Primary | ICD-10-CM

## 2019-05-29 DIAGNOSIS — Z00.00 NORMAL PHYSICAL EXAM: ICD-10-CM

## 2019-05-29 DIAGNOSIS — Z90.49 STATUS POST CHOLECYSTECTOMY: ICD-10-CM

## 2019-05-29 LAB
ALBUMIN SERPL BCP-MCNC: 4.2 G/DL (ref 3.5–5.2)
ALP SERPL-CCNC: 140 U/L (ref 55–135)
ALT SERPL W/O P-5'-P-CCNC: 42 U/L (ref 10–44)
ANION GAP SERPL CALC-SCNC: 12 MMOL/L (ref 8–16)
AST SERPL-CCNC: 36 U/L (ref 10–40)
BILIRUB SERPL-MCNC: 0.6 MG/DL (ref 0.1–1)
BUN SERPL-MCNC: 25 MG/DL (ref 8–23)
CALCIUM SERPL-MCNC: 10.3 MG/DL (ref 8.7–10.5)
CHLORIDE SERPL-SCNC: 104 MMOL/L (ref 95–110)
CHOLEST SERPL-MCNC: 195 MG/DL (ref 120–199)
CHOLEST/HDLC SERPL: 2.6 {RATIO} (ref 2–5)
CO2 SERPL-SCNC: 26 MMOL/L (ref 23–29)
CREAT SERPL-MCNC: 1 MG/DL (ref 0.5–1.4)
EST. GFR  (AFRICAN AMERICAN): >60 ML/MIN/1.73 M^2
EST. GFR  (NON AFRICAN AMERICAN): 58.4 ML/MIN/1.73 M^2
GLUCOSE SERPL-MCNC: 115 MG/DL (ref 70–110)
HDLC SERPL-MCNC: 76 MG/DL (ref 40–75)
HDLC SERPL: 39 % (ref 20–50)
LDLC SERPL CALC-MCNC: 94.2 MG/DL (ref 63–159)
NONHDLC SERPL-MCNC: 119 MG/DL
POTASSIUM SERPL-SCNC: 3.7 MMOL/L (ref 3.5–5.1)
PROT SERPL-MCNC: 7.9 G/DL (ref 6–8.4)
SODIUM SERPL-SCNC: 142 MMOL/L (ref 136–145)
TRIGL SERPL-MCNC: 124 MG/DL (ref 30–150)

## 2019-05-29 PROCEDURE — 99999 PR PBB SHADOW E&M-EST. PATIENT-LVL III: CPT | Mod: PBBFAC,,, | Performed by: INTERNAL MEDICINE

## 2019-05-29 PROCEDURE — 80061 LIPID PANEL: CPT

## 2019-05-29 PROCEDURE — 99203 OFFICE O/P NEW LOW 30 MIN: CPT | Mod: S$GLB,,, | Performed by: INTERNAL MEDICINE

## 2019-05-29 PROCEDURE — 3075F SYST BP GE 130 - 139MM HG: CPT | Mod: CPTII,S$GLB,, | Performed by: INTERNAL MEDICINE

## 2019-05-29 PROCEDURE — 99999 PR PBB SHADOW E&M-EST. PATIENT-LVL III: ICD-10-PCS | Mod: PBBFAC,,, | Performed by: INTERNAL MEDICINE

## 2019-05-29 PROCEDURE — 3078F PR MOST RECENT DIASTOLIC BLOOD PRESSURE < 80 MM HG: ICD-10-PCS | Mod: CPTII,S$GLB,, | Performed by: INTERNAL MEDICINE

## 2019-05-29 PROCEDURE — 3078F DIAST BP <80 MM HG: CPT | Mod: CPTII,S$GLB,, | Performed by: INTERNAL MEDICINE

## 2019-05-29 PROCEDURE — 80053 COMPREHEN METABOLIC PANEL: CPT

## 2019-05-29 PROCEDURE — 99203 PR OFFICE/OUTPT VISIT, NEW, LEVL III, 30-44 MIN: ICD-10-PCS | Mod: S$GLB,,, | Performed by: INTERNAL MEDICINE

## 2019-05-29 PROCEDURE — 3075F PR MOST RECENT SYSTOLIC BLOOD PRESS GE 130-139MM HG: ICD-10-PCS | Mod: CPTII,S$GLB,, | Performed by: INTERNAL MEDICINE

## 2019-05-29 PROCEDURE — 36415 COLL VENOUS BLD VENIPUNCTURE: CPT | Mod: PO

## 2019-05-29 RX ORDER — ATENOLOL AND CHLORTHALIDONE TABLET 50; 25 MG/1; MG/1
1 TABLET ORAL DAILY
Qty: 90 TABLET | Refills: 3
Start: 2019-05-29 | End: 2019-07-25 | Stop reason: SDUPTHER

## 2019-05-29 RX ORDER — POTASSIUM CHLORIDE 750 MG/1
10 TABLET, EXTENDED RELEASE ORAL DAILY
Qty: 90 TABLET | Refills: 3
Start: 2019-05-29 | End: 2019-07-16 | Stop reason: SDUPTHER

## 2019-05-29 RX ORDER — ATORVASTATIN CALCIUM 10 MG/1
10 TABLET, FILM COATED ORAL DAILY
Qty: 90 TABLET | Refills: 3
Start: 2019-05-29 | End: 2019-07-16 | Stop reason: SDUPTHER

## 2019-05-29 NOTE — PROGRESS NOTES
Creatinine 1.0, possibly CKD stage 3, no previous for comparison except for 5 years previous.  Lipid profile good on low-dose atorvastatin no change.  Await old records to compare creatinine, history vaccinations, history of colonoscopy, history of hepatitis C screening  Results to patient via my [facility].  No change in regimen.  Plan for physical exam 1 year.

## 2019-05-29 NOTE — LETTER
May 29, 2019    Dr. Roby LEIVA             [address]  [place] [address]  Phone: [phone]  Fax: [phone] May 29, 2019     Patient: Yahaira Hinson    YOB: 1950   Date of Visit: 5/29/2019     To Whom It May Concern:    We are seeing Yahaira Hinson in the clinic at [facility].  Raul Adkins MD is the patient's PCP.  She/He has an outstanding lab/procedure at the time we reviewed his/her chart.  To help with our Health Maintenance records will you please supply the following report(s)       (  )  MAMMOGRAM                                             ( x )  COLONOSCOPY      (  )  PAP SMEAR                                                 ( x )  OUTSIDE LAB RESULTS     (  )  DEXA SCAN                                                 (  )  EYE EXAM            (  )  FOOT EXAM                                                 (  )  ENTIRE RECORD     (  )  OUTSIDE IMMUNIZATIONS                        (x  )  Hepatitis C testing_         Please Fax to [facility], [address], [phone]     If any questions please contact Gisselle at [phone]

## 2019-05-29 NOTE — LETTER
May 29, 2019    Dr. LORETTA Ziegler               [address]  [place] [address]  Phone: [phone]  Fax: [phone] May 29, 2019     Patient: Yahaira Hinson    YOB: 1950   Date of Visit: 5/29/2019     To Whom It May Concern:    We are seeing Yahaira Hinson in the clinic at [facility].  Raul Adkins MD (not Dr. Neves) is the patient's PCP.  She/He has an outstanding lab/procedure at the time we reviewed his/her chart.  To help with our Health Maintenance records will you please supply the following report(s)       (  )  MAMMOGRAM                                             (  )  COLONOSCOPY      (  )  PAP SMEAR                                                 (  )  OUTSIDE LAB RESULTS     (  )  DEXA SCAN                                                 (  )  EYE EXAM            (  )  FOOT EXAM                                                 (x  )  ENTIRE RECORD     (  )  OUTSIDE IMMUNIZATIONS                        (  )  _______________         Please Fax to [facility], [place] [address], [phone]     If any questions please contact Gisselle at [phone]

## 2019-05-29 NOTE — LETTER
May 29, 2019    Dr. LORETTA Ziegler               [facility]  [address]  [place] [address]  Phone: [phone]  Fax: [phone] May 29, 2019     Patient: Yahaira Hinson    YOB: 1950   Date of Visit: 5/29/2019     To Whom It May Concern:    We are seeing Yahaira Hinson in the clinic at [facility].  Jordon Neves MD is the patient's PCP.  She/He has an outstanding lab/procedure at the time we reviewed his/her chart.  To help with our Health Maintenance records will you please supply the following report(s)       (  )  MAMMOGRAM                                             (  )  COLONOSCOPY      (  )  PAP SMEAR                                                 (  )  OUTSIDE LAB RESULTS     (  )  DEXA SCAN                                                 (  )  EYE EXAM            (  )  FOOT EXAM                                                 (x  )  ENTIRE RECORD     (  )  OUTSIDE IMMUNIZATIONS                        (  )  _______________         Please Fax to [facility], [place], [phone]     If any questions please contact Gisselle at [phone]

## 2019-06-06 NOTE — PROGRESS NOTES
Assessment /Plan     For exam results, see Encounter Report.    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    Traumatic Glaucoma OD  Congenital Glaucoma  Prosthesis OS    Mid-low teens    CCT  530    Right --> patient recipe  Cosopt TID  Alphagan TID  Giovani TID   Beni q day  FML 5 days / week     Diamox --> Intol Hypo K --> now on K suppl -->  D 250 BID --> MTMT  Tolerated well in past     --> re-consider M laser or IN tube OD as re-discussed  --> used eye model for IN  :: Held  --> patient tending towards MP3 with IOP in teens / low 20's  --> if IOP Mid - Hi 20's --> reconsider     PC IOL OD  Quiet    Monocular precautions    Right eye MP3 / G6 Laser  Glaucoma Incisional Surgery Consent: Patient with poorly controlled glaucoma on MTMT with IOP deemed too high for the health of the eye.  Discussed with patient options, risks and benefits, expectations of glaucoma surgery  with questions and answers to facilitate discussion.  The patient voice good understanding and patient wishes to proceed with surgery.  The patient will likely benefit from surgery and patient signed consent for Right Eye.    Plan  RTC 1-2 weeks MP3 / G6 laser OD --> planning to go to [facility] @ 2019  RTC sooner prn with good understanding

## 2019-06-07 DIAGNOSIS — Z11.59 NEED FOR HEPATITIS C SCREENING TEST: ICD-10-CM

## 2019-06-07 DIAGNOSIS — Z12.11 COLON CANCER SCREENING: ICD-10-CM

## 2019-06-17 ENCOUNTER — OFFICE VISIT (OUTPATIENT)
Dept: OPHTHALMOLOGY | Facility: CLINIC | Age: 69
End: 2019-06-17
Payer: MEDICARE

## 2019-06-17 ENCOUNTER — TELEPHONE (OUTPATIENT)
Dept: OPHTHALMOLOGY | Facility: CLINIC | Age: 69
End: 2019-06-17

## 2019-06-17 DIAGNOSIS — H40.31X3 GLAUCOMA OF RIGHT EYE ASSOCIATED WITH OCULAR TRAUMA, SEVERE STAGE: Primary | ICD-10-CM

## 2019-06-17 DIAGNOSIS — Z98.41 STATUS POST CATARACT EXTRACTION AND INSERTION OF INTRAOCULAR LENS OF RIGHT EYE: ICD-10-CM

## 2019-06-17 DIAGNOSIS — Z94.7 TRANSPLANTED CORNEA: ICD-10-CM

## 2019-06-17 DIAGNOSIS — Q15.0 CONGENITAL GLAUCOMA OF BOTH EYES: ICD-10-CM

## 2019-06-17 DIAGNOSIS — Z97.0 EYE GLOBE PROSTHESIS: ICD-10-CM

## 2019-06-17 DIAGNOSIS — H40.31X3 GLAUCOMA ASSOCIATED WITH OCULAR TRAUMA OF RIGHT EYE, SEVERE STAGE: Primary | ICD-10-CM

## 2019-06-17 DIAGNOSIS — Z96.1 STATUS POST CATARACT EXTRACTION AND INSERTION OF INTRAOCULAR LENS OF RIGHT EYE: ICD-10-CM

## 2019-06-17 PROCEDURE — 99999 PR PBB SHADOW E&M-EST. PATIENT-LVL II: CPT | Mod: PBBFAC,,, | Performed by: OPHTHALMOLOGY

## 2019-06-17 PROCEDURE — 99999 PR PBB SHADOW E&M-EST. PATIENT-LVL II: ICD-10-PCS | Mod: PBBFAC,,, | Performed by: OPHTHALMOLOGY

## 2019-06-17 PROCEDURE — 92012 INTRM OPH EXAM EST PATIENT: CPT | Mod: S$GLB,,, | Performed by: OPHTHALMOLOGY

## 2019-06-17 PROCEDURE — 92012 PR EYE EXAM, EST PATIENT,INTERMED: ICD-10-PCS | Mod: S$GLB,,, | Performed by: OPHTHALMOLOGY

## 2019-06-18 ENCOUNTER — TELEPHONE (OUTPATIENT)
Dept: OPHTHALMOLOGY | Facility: CLINIC | Age: 69
End: 2019-06-18

## 2019-06-19 ENCOUNTER — TELEPHONE (OUTPATIENT)
Dept: OPTOMETRY | Facility: CLINIC | Age: 69
End: 2019-06-19

## 2019-06-19 RX ORDER — VITAMIN E 268 MG
400 CAPSULE ORAL EVERY MORNING
COMMUNITY
End: 2023-08-01

## 2019-06-19 NOTE — PRE-PROCEDURE INSTRUCTIONS
Spoke with Patient.  NPO, medication, and pre-op instructions reviewed.  Arrival time is 1000.  Instructed that she can have clear liquids between 0000 -0800 and then to remain NPO after 0800.  Denies previous problems with Anesthesia.   Verbalized understanding of instructions.     Sent an IM to [facility] (Dr. Beaver) to see if she should use her eye drops the morning of surgery.  Was informed that she can use her eye drops the morning of surgery.  Left a phone message that she can use the eye drops that would normally be due the morning of surgery.

## 2019-06-20 ENCOUNTER — ANESTHESIA EVENT (OUTPATIENT)
Dept: SURGERY | Facility: HOSPITAL | Age: 69
End: 2019-06-20
Payer: MEDICARE

## 2019-06-20 ENCOUNTER — HOSPITAL ENCOUNTER (OUTPATIENT)
Facility: HOSPITAL | Age: 69
Discharge: HOME | End: 2019-06-20
Attending: OPHTHALMOLOGY | Admitting: OPHTHALMOLOGY
Payer: MEDICARE

## 2019-06-20 ENCOUNTER — ANESTHESIA (OUTPATIENT)
Dept: SURGERY | Facility: HOSPITAL | Age: 69
End: 2019-06-20
Payer: MEDICARE

## 2019-06-20 VITALS
BODY MASS INDEX: 26.13 KG/M2 | OXYGEN SATURATION: 98 % | TEMPERATURE: 99 F | WEIGHT: 142 LBS | SYSTOLIC BLOOD PRESSURE: 119 MMHG | HEART RATE: 63 BPM | HEIGHT: 62 IN | DIASTOLIC BLOOD PRESSURE: 57 MMHG | RESPIRATION RATE: 22 BRPM

## 2019-06-20 DIAGNOSIS — H40.9 GLAUCOMA: ICD-10-CM

## 2019-06-20 DIAGNOSIS — H40.31X3 GLAUCOMA OF RIGHT EYE ASSOCIATED WITH OCULAR TRAUMA, SEVERE STAGE: Primary | ICD-10-CM

## 2019-06-20 PROCEDURE — 71000044 HC DOSC ROUTINE RECOVERY FIRST HOUR: Performed by: OPHTHALMOLOGY

## 2019-06-20 PROCEDURE — 37000008 HC ANESTHESIA 1ST 15 MINUTES: Performed by: OPHTHALMOLOGY

## 2019-06-20 PROCEDURE — 63600175 PHARM REV CODE 636 W HCPCS: Performed by: NURSE ANESTHETIST, CERTIFIED REGISTERED

## 2019-06-20 PROCEDURE — 25000003 PHARM REV CODE 250: Performed by: OPHTHALMOLOGY

## 2019-06-20 PROCEDURE — 71000015 HC POSTOP RECOV 1ST HR: Performed by: OPHTHALMOLOGY

## 2019-06-20 PROCEDURE — 37000009 HC ANESTHESIA EA ADD 15 MINS: Performed by: OPHTHALMOLOGY

## 2019-06-20 PROCEDURE — 63600175 PHARM REV CODE 636 W HCPCS: Performed by: ANESTHESIOLOGY

## 2019-06-20 PROCEDURE — D9220A PRA ANESTHESIA: Mod: ANES,,, | Performed by: ANESTHESIOLOGY

## 2019-06-20 PROCEDURE — 66710 CILIARY TRANSSLERAL THERAPY: CPT | Mod: RT,,, | Performed by: OPHTHALMOLOGY

## 2019-06-20 PROCEDURE — D9220A PRA ANESTHESIA: ICD-10-PCS | Mod: CRNA,,, | Performed by: NURSE ANESTHETIST, CERTIFIED REGISTERED

## 2019-06-20 PROCEDURE — 36000707: Performed by: OPHTHALMOLOGY

## 2019-06-20 PROCEDURE — D9220A PRA ANESTHESIA: Mod: CRNA,,, | Performed by: NURSE ANESTHETIST, CERTIFIED REGISTERED

## 2019-06-20 PROCEDURE — 36000706: Performed by: OPHTHALMOLOGY

## 2019-06-20 PROCEDURE — D9220A PRA ANESTHESIA: ICD-10-PCS | Mod: ANES,,, | Performed by: ANESTHESIOLOGY

## 2019-06-20 PROCEDURE — 66710 PR DESTRUC,CILIARY BODY,CYCLOPHOTOCOAG: ICD-10-PCS | Mod: RT,,, | Performed by: OPHTHALMOLOGY

## 2019-06-20 RX ORDER — HYDROMORPHONE HYDROCHLORIDE 1 MG/ML
0.2 INJECTION, SOLUTION INTRAMUSCULAR; INTRAVENOUS; SUBCUTANEOUS EVERY 5 MIN PRN
Status: DISCONTINUED | OUTPATIENT
Start: 2019-06-20 | End: 2019-06-20 | Stop reason: HOSPADM

## 2019-06-20 RX ORDER — DIPHENHYDRAMINE HYDROCHLORIDE 50 MG/ML
25 INJECTION INTRAMUSCULAR; INTRAVENOUS EVERY 6 HOURS PRN
Status: DISCONTINUED | OUTPATIENT
Start: 2019-06-20 | End: 2019-06-20 | Stop reason: HOSPADM

## 2019-06-20 RX ORDER — PROPOFOL 10 MG/ML
VIAL (ML) INTRAVENOUS
Status: DISCONTINUED | OUTPATIENT
Start: 2019-06-20 | End: 2019-06-20

## 2019-06-20 RX ORDER — ACETAMINOPHEN 325 MG/1
650 TABLET ORAL EVERY 4 HOURS PRN
Status: DISCONTINUED | OUTPATIENT
Start: 2019-06-20 | End: 2019-06-20 | Stop reason: HOSPADM

## 2019-06-20 RX ORDER — FENTANYL CITRATE 50 UG/ML
25 INJECTION, SOLUTION INTRAMUSCULAR; INTRAVENOUS EVERY 5 MIN PRN
Status: DISCONTINUED | OUTPATIENT
Start: 2019-06-20 | End: 2019-06-20 | Stop reason: HOSPADM

## 2019-06-20 RX ORDER — SODIUM CHLORIDE 9 MG/ML
INJECTION, SOLUTION INTRAVENOUS CONTINUOUS
Status: DISCONTINUED | OUTPATIENT
Start: 2019-06-20 | End: 2019-06-20 | Stop reason: HOSPADM

## 2019-06-20 RX ORDER — FENTANYL CITRATE 50 UG/ML
INJECTION, SOLUTION INTRAMUSCULAR; INTRAVENOUS
Status: DISCONTINUED | OUTPATIENT
Start: 2019-06-20 | End: 2019-06-20

## 2019-06-20 RX ORDER — LIDOCAINE HYDROCHLORIDE 40 MG/ML
1 INJECTION, SOLUTION RETROBULBAR
Status: DISCONTINUED | OUTPATIENT
Start: 2019-06-20 | End: 2019-06-20 | Stop reason: HOSPADM

## 2019-06-20 RX ORDER — MIDAZOLAM HYDROCHLORIDE 1 MG/ML
INJECTION, SOLUTION INTRAMUSCULAR; INTRAVENOUS
Status: DISCONTINUED | OUTPATIENT
Start: 2019-06-20 | End: 2019-06-20

## 2019-06-20 RX ORDER — NEOMYCIN SULFATE, POLYMYXIN B SULFATE, AND DEXAMETHASONE 3.5; 10000; 1 MG/G; [USP'U]/G; MG/G
OINTMENT OPHTHALMIC
Status: DISCONTINUED
Start: 2019-06-20 | End: 2019-06-20 | Stop reason: HOSPADM

## 2019-06-20 RX ADMIN — PROPOFOL 20 MG: 10 INJECTION, EMULSION INTRAVENOUS at 10:06

## 2019-06-20 RX ADMIN — PROPOFOL 40 MG: 10 INJECTION, EMULSION INTRAVENOUS at 10:06

## 2019-06-20 RX ADMIN — FENTANYL CITRATE 50 MCG: 50 INJECTION, SOLUTION INTRAMUSCULAR; INTRAVENOUS at 10:06

## 2019-06-20 RX ADMIN — FENTANYL CITRATE 25 MCG: 50 INJECTION INTRAMUSCULAR; INTRAVENOUS at 11:06

## 2019-06-20 RX ADMIN — SODIUM CHLORIDE 1000 ML: 0.9 INJECTION, SOLUTION INTRAVENOUS at 09:06

## 2019-06-20 RX ADMIN — MIDAZOLAM HYDROCHLORIDE 1 MG: 1 INJECTION, SOLUTION INTRAMUSCULAR; INTRAVENOUS at 10:06

## 2019-06-20 RX ADMIN — ACETAMINOPHEN 650 MG: 325 TABLET ORAL at 11:06

## 2019-06-20 NOTE — OP NOTE
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    ESTIMATED BLOOD LOSS: Minimal.    ANESTHESIA: Topical with MAC    PROCEDURE IN DETIAL: The patient is a 68 year old  woman with poorly controlled glaucoma.  The intraocular pressure was deemed too high for the health the optic nerve and visual field status. Discussions have been carried out with this patient regarding the pertinent options, surgical risks and benefits of the procedure and expectations.   The patient  voiced good understanding and wished to proceed with the above procedure.    The patient and correct eye to be operated on were identified by the operating surgeon and surgical team and the patient was brought into the operating room. The patient received topical anesthesia, a speculum was placed and Gonak solution was applied to the eye surface.    The MP3 handpiece was positioned on the eye in proper location using the calibrated guides provided on the probe and treatment was begun with nine 10 second timed sweeps (90 sec total) at 2000 mW through 180 degrees of treatment.  Care was taken to skip the 3 & 9 o'clock locations. Two 90 seconds treatments were performed on the inferior 180 degrees and 30 seconds of treatment was placed in the superior region - care was taken to avoid the regions of ectasia and thin bleb regions. The anterior chamber was well formed throughout the case and there were no complications.  Following the procedure, maxitrol ointment was placed on the cornea.  The eye was  closed & patched.  The patient was taken to the recovery room in good and stable condition.  The patient tolerated the procedure well.  The patient  was instructed to refrain from any heavy lifting, bending, stooping, or straining activities, discharged Home and to follow up in the morning for routine postoperative care with Dr. Doroteo Beaver.

## 2019-06-20 NOTE — ANESTHESIA POSTPROCEDURE EVALUATION
Anesthesia Post Evaluation    Patient: Yahaira Hinson    Procedure(s) Performed: Procedure(s) (LRB):  G6/MP3 LASER (Right)    Final Anesthesia Type: general  Patient location during evaluation: PACU  Patient participation: Yes- Able to Participate  Level of consciousness: awake and alert  Post-procedure vital signs: reviewed and stable  Pain management: adequate  Airway patency: patent  PONV status at discharge: No PONV  Anesthetic complications: no      Cardiovascular status: hemodynamically stable  Respiratory status: unassisted  Hydration status: euvolemic  Follow-up not needed.          Vitals Value Taken Time   /57 6/20/2019 11:47 AM   Temp 36.9 °C (98.5 °F) 6/20/2019 11:45 AM   Pulse 58 6/20/2019 11:52 AM   Resp 35 6/20/2019 11:52 AM   SpO2 98 % 6/20/2019 11:52 AM   Vitals shown include unvalidated device data.      No case tracking events are documented in the log.      Pain/Rita Score: Pain Rating Prior to Med Admin: 7 (6/20/2019 11:27 AM)  Rita Score: 10 (6/20/2019 11:25 AM)

## 2019-06-20 NOTE — INTERVAL H&P NOTE
No Interval change  Complex ocular Hx  Monocular  Traumatic glaucoma OD poorly controlled  Planning MP3 / G6 laser OD  Re-discussed with Patient and  at bedside

## 2019-06-20 NOTE — DISCHARGE INSTRUCTIONS

## 2019-06-20 NOTE — TRANSFER OF CARE
"Anesthesia Transfer of Care Note    Patient: Yahaira Hinson    Procedure(s) Performed: Procedure(s) (LRB):  G6/MP3 LASER (Right)    Patient location: PACU    Anesthesia Type: general    Transport from OR: Transported from OR on room air with adequate spontaneous ventilation    Post pain: adequate analgesia    Post assessment: no apparent anesthetic complications and tolerated procedure well    Post vital signs: stable    Level of consciousness: awake    Nausea/Vomiting: no nausea/vomiting    Complications: none    Transfer of care protocol was followed      Last vitals:   Visit Vitals  BP (!) 145/64 (BP Location: Left arm, Patient Position: Lying)   Pulse 63   Temp 36.8 °C (98.2 °F) (Temporal)   Resp 17   Ht 5' 2" (1.575 m)   Wt 64.4 kg (142 lb)   SpO2 100%   Breastfeeding? No   BMI 25.97 kg/m²     "

## 2019-06-20 NOTE — ANESTHESIA PREPROCEDURE EVALUATION
06/20/2019  Yahaira Hinson is a 68 y.o., female.  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 Ca    Thalassemia    Glaucoma         Anesthesia Evaluation         Review of Systems      Physical Exam  General:  Well nourished    Airway/Jaw/Neck:  Airway Findings: Mouth Opening: Normal Tongue: Normal  General Airway Assessment: Adult  Mallampati: II  Improves to II with phonation.  TM Distance: Normal, at least 6 cm      Dental:  Dental Findings: In tact   Chest/Lungs:  Chest/Lungs Findings: Clear to auscultation     Heart/Vascular:  Heart Findings: Rate: Normal  Rhythm: Regular Rhythm  Sounds: Normal        Mental Status:  Mental Status Findings:  Cooperative, Alert and Oriented         Anesthesia Plan  Type of Anesthesia, risks & benefits discussed:  Anesthesia Type:  MAC  Patient's Preference: Sedation  Intra-op Monitoring Plan: standard ASA monitors  Intra-op Monitoring Plan Comments:   Post Op Pain Control Plan: per primary service following discharge from PACU  Post Op Pain Control Plan Comments:   Induction:   IV  Beta Blocker:  Patient is not currently on a Beta-Blocker (No further documentation required).       Informed Consent: Patient understands risks and agrees with Anesthesia plan.  Questions answered.   ASA Score: 2     Day of Surgery Review of History & Physical:    H&P update referred to the surgeon.     Anesthesia Plan Notes: Sedation plan discussed.          Ready For Surgery From Anesthesia Perspective.

## 2019-06-20 NOTE — PLAN OF CARE
Patient states they are ready to be discharged. Instructions and prescription given to patient and family. Both verbalize understanding. Patient tolerating po liquids with no difficulty. Patient states pain is at a tolerable level for them. Anesthesia consent and surgical consent in chart upon patient's discharge from [facility].

## 2019-06-20 NOTE — DISCHARGE SUMMARY
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    ESTIMATED BLOOD LOSS: Minimal.    ANESTHESIA: Topical with MAC    PROCEDURE IN DETIAL: The patient is a 68 year old  woman with poorly controlled glaucoma.  The intraocular pressure was deemed too high for the health the optic nerve and visual field status. Discussions have been carried out with this patient regarding the pertinent options, surgical risks and benefits of the procedure and expectations.   The patient  voiced good understanding and wished to proceed with the above procedure.    The patient and correct eye to be operated on were identified by the operating surgeon and surgical team and the patient was brought into the operating room. The patient received topical anesthesia, a speculum was placed and Gonak solution was applied to the eye surface.    The MP3 handpiece was positioned on the eye in proper location using the calibrated guides provided on the probe and treatment was begun with nine 10 second timed sweeps (90 sec total) at 2000 mW through 180 degrees of treatment.  Care was taken to skip the 3 & 9 o'clock locations. Two 90 seconds treatments were performed on the inferior 180 degrees and 30 seconds of treatment was placed in the superior region - care was taken to avoid the regions of ectasia and thin bleb regions. The anterior chamber was well formed throughout the case and there were no complications.  Following the procedure, maxitrol ointment was placed on the cornea.  The eye was  closed & patched.  The patient was taken to the recovery room in good and stable condition.  The patient tolerated the procedure well.  The patient  was instructed to refrain from any heavy lifting, bending, stooping, or straining activities, discharged Home and to follow up in the morning for routine postoperative care with Dr. Doroteo Beaver.

## 2019-06-21 ENCOUNTER — OFFICE VISIT (OUTPATIENT)
Dept: OPHTHALMOLOGY | Facility: CLINIC | Age: 69
End: 2019-06-21
Payer: MEDICARE

## 2019-06-21 DIAGNOSIS — Q15.0 CONGENITAL GLAUCOMA OF BOTH EYES: ICD-10-CM

## 2019-06-21 DIAGNOSIS — Z96.1 STATUS POST CATARACT EXTRACTION AND INSERTION OF INTRAOCULAR LENS OF RIGHT EYE: ICD-10-CM

## 2019-06-21 DIAGNOSIS — H40.31X3 GLAUCOMA OF RIGHT EYE ASSOCIATED WITH OCULAR TRAUMA, SEVERE STAGE: Primary | ICD-10-CM

## 2019-06-21 DIAGNOSIS — Z98.41 STATUS POST CATARACT EXTRACTION AND INSERTION OF INTRAOCULAR LENS OF RIGHT EYE: ICD-10-CM

## 2019-06-21 DIAGNOSIS — Z94.7 TRANSPLANTED CORNEA: ICD-10-CM

## 2019-06-21 DIAGNOSIS — Z97.0 EYE GLOBE PROSTHESIS: ICD-10-CM

## 2019-06-21 PROCEDURE — 99999 PR PBB SHADOW E&M-EST. PATIENT-LVL II: ICD-10-PCS | Mod: PBBFAC,,, | Performed by: OPHTHALMOLOGY

## 2019-06-21 PROCEDURE — 99999 PR PBB SHADOW E&M-EST. PATIENT-LVL II: CPT | Mod: PBBFAC,,, | Performed by: OPHTHALMOLOGY

## 2019-06-21 PROCEDURE — 99024 POSTOP FOLLOW-UP VISIT: CPT | Mod: S$GLB,,, | Performed by: OPHTHALMOLOGY

## 2019-06-21 PROCEDURE — 99024 PR POST-OP FOLLOW-UP VISIT: ICD-10-PCS | Mod: S$GLB,,, | Performed by: OPHTHALMOLOGY

## 2019-06-21 NOTE — PROGRESS NOTES
Assessment /Plan     For exam results, see Encounter Report.    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 The patient and family voice good understanding and will return as scheduled or sooner as needed.    Maxitrol BID x 4 days      Plan  RTC 1-2 weeks p MP3 / G6 laser OD --> planning to go to [facility] @ 2019  RTC sooner prn with good understanding

## 2019-06-23 NOTE — PROGRESS NOTES
Assessment /Plan     For exam results, see Encounter Report.    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 Toxic  Alphagan TID  Giovani TID --> holding  Beni q day  FML 5 days / week     Diamox --> Intol Hypo K --> now on K suppl -->  D 250 BID --> Holding  Tolerated well in past       PC IOL OD  Quiet    Monocular precautions    Right eye MP3 / G6 Laser 2019  Post-op glaucoma surgery right Eye, POW 1: Patient doing well, reviewed post-op instructions handout with limited activity & eye care instructions, eye drop therapy and endophthalmitis precautions. The patient and family voice good understanding and will return as scheduled or sooner as needed.    Maxitrol BID x 4 days      Plan  RTC 2 weeks p MP3 / G6 laser OD --> planning to go to [facility] @ 2019  RTC sooner prn with good understanding

## 2019-06-28 ENCOUNTER — OFFICE VISIT (OUTPATIENT)
Dept: OPHTHALMOLOGY | Facility: CLINIC | Age: 69
End: 2019-06-28
Payer: MEDICARE

## 2019-06-28 DIAGNOSIS — H40.31X3 GLAUCOMA OF RIGHT EYE ASSOCIATED WITH OCULAR TRAUMA, SEVERE STAGE: Primary | ICD-10-CM

## 2019-06-28 DIAGNOSIS — Z94.7 TRANSPLANTED CORNEA: ICD-10-CM

## 2019-06-28 DIAGNOSIS — Z96.1 STATUS POST CATARACT EXTRACTION AND INSERTION OF INTRAOCULAR LENS OF RIGHT EYE: ICD-10-CM

## 2019-06-28 DIAGNOSIS — Q15.0 CONGENITAL GLAUCOMA OF BOTH EYES: ICD-10-CM

## 2019-06-28 DIAGNOSIS — Z98.41 STATUS POST CATARACT EXTRACTION AND INSERTION OF INTRAOCULAR LENS OF RIGHT EYE: ICD-10-CM

## 2019-06-28 PROCEDURE — 99999 PR PBB SHADOW E&M-EST. PATIENT-LVL II: ICD-10-PCS | Mod: PBBFAC,,, | Performed by: OPHTHALMOLOGY

## 2019-06-28 PROCEDURE — 99024 POSTOP FOLLOW-UP VISIT: CPT | Mod: S$GLB,,, | Performed by: OPHTHALMOLOGY

## 2019-06-28 PROCEDURE — 99024 PR POST-OP FOLLOW-UP VISIT: ICD-10-PCS | Mod: S$GLB,,, | Performed by: OPHTHALMOLOGY

## 2019-06-28 PROCEDURE — 99999 PR PBB SHADOW E&M-EST. PATIENT-LVL II: CPT | Mod: PBBFAC,,, | Performed by: OPHTHALMOLOGY

## 2019-07-09 DIAGNOSIS — E78.00 PURE HYPERCHOLESTEROLEMIA: ICD-10-CM

## 2019-07-09 DIAGNOSIS — I10 ESSENTIAL HYPERTENSION: ICD-10-CM

## 2019-07-09 RX ORDER — CHLOPHEDIANOL HYDROCHLORIDE, DEXBROMPHENIRAMINE MALEATE 12.5; 1 MG/5ML; MG/5ML
LIQUID ORAL
Qty: 120 ML | Refills: 0 | Status: SHIPPED | OUTPATIENT
Start: 2019-07-09 | End: 2019-12-04

## 2019-07-10 NOTE — PROGRESS NOTES
Assessment /Plan     For exam results, see Encounter Report.    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 The patient and family voice good understanding and will return as scheduled or sooner as needed.    Maxitrol BID x 4 days      Plan  RTC 2 months p MP3 / G6 laser OD --> planning to go to [facility] @ 2019  RTC sooner prn with good understanding

## 2019-07-15 ENCOUNTER — OFFICE VISIT (OUTPATIENT)
Dept: OPHTHALMOLOGY | Facility: CLINIC | Age: 69
End: 2019-07-15
Payer: MEDICARE

## 2019-07-15 DIAGNOSIS — Z96.1 STATUS POST CATARACT EXTRACTION AND INSERTION OF INTRAOCULAR LENS OF RIGHT EYE: ICD-10-CM

## 2019-07-15 DIAGNOSIS — H40.31X3 GLAUCOMA OF RIGHT EYE ASSOCIATED WITH OCULAR TRAUMA, SEVERE STAGE: Primary | ICD-10-CM

## 2019-07-15 DIAGNOSIS — Q15.0 CONGENITAL GLAUCOMA OF BOTH EYES: ICD-10-CM

## 2019-07-15 DIAGNOSIS — Z98.41 STATUS POST CATARACT EXTRACTION AND INSERTION OF INTRAOCULAR LENS OF RIGHT EYE: ICD-10-CM

## 2019-07-15 DIAGNOSIS — Z94.7 TRANSPLANTED CORNEA: ICD-10-CM

## 2019-07-15 PROCEDURE — 99999 PR PBB SHADOW E&M-EST. PATIENT-LVL II: CPT | Mod: PBBFAC,,, | Performed by: OPHTHALMOLOGY

## 2019-07-15 PROCEDURE — 99999 PR PBB SHADOW E&M-EST. PATIENT-LVL II: ICD-10-PCS | Mod: PBBFAC,,, | Performed by: OPHTHALMOLOGY

## 2019-07-15 PROCEDURE — 99024 PR POST-OP FOLLOW-UP VISIT: ICD-10-PCS | Mod: S$GLB,,, | Performed by: OPHTHALMOLOGY

## 2019-07-15 PROCEDURE — 99024 POSTOP FOLLOW-UP VISIT: CPT | Mod: S$GLB,,, | Performed by: OPHTHALMOLOGY

## 2019-07-16 RX ORDER — POTASSIUM CHLORIDE 750 MG/1
10 TABLET, EXTENDED RELEASE ORAL DAILY
Qty: 90 TABLET | Refills: 3 | Status: SHIPPED | OUTPATIENT
Start: 2019-07-16 | End: 2020-07-22 | Stop reason: SDUPTHER

## 2019-07-16 RX ORDER — ATORVASTATIN CALCIUM 10 MG/1
10 TABLET, FILM COATED ORAL DAILY
Qty: 90 TABLET | Refills: 3 | Status: SHIPPED | OUTPATIENT
Start: 2019-07-16 | End: 2020-07-22 | Stop reason: SDUPTHER

## 2019-07-25 DIAGNOSIS — I10 ESSENTIAL HYPERTENSION: ICD-10-CM

## 2019-07-25 RX ORDER — ATENOLOL AND CHLORTHALIDONE TABLET 50; 25 MG/1; MG/1
1 TABLET ORAL EVERY MORNING
Qty: 90 TABLET | Refills: 3 | Status: SHIPPED | OUTPATIENT
Start: 2019-07-25 | End: 2020-07-22 | Stop reason: SDUPTHER

## 2019-07-25 NOTE — TELEPHONE ENCOUNTER
----- Message from Marsha Bynum sent at 7/25/2019 11:52 AM CDT -----  Contact: Self  Type: RX Refill Request    Who Called: Self    Refill or New Rx:Refill     RX Name and Strength: atenolol-chlorthalidone (TENORETIC) 50-25 mg Tab    How is the patient currently taking it? 1 per day    Is this a 30 day or 90 day RX: 90    Preferred Pharmacy with phone number: ..  [id number] [address]  [place] [address]  Phone: [phone] Fax: [phone]      Local or Mail Order:Local     Ordering Provider: Dr. Adkins    Would the patient rather a call back or a response via [facility]? Callback    Best Call Back Number: [phone]

## 2019-08-22 DIAGNOSIS — H40.1113 PRIMARY OPEN ANGLE GLAUCOMA (POAG) OF RIGHT EYE, SEVERE STAGE: ICD-10-CM

## 2019-08-23 RX ORDER — FLUOROMETHOLONE 1 MG/ML
SUSPENSION/ DROPS OPHTHALMIC
Qty: 10 ML | Refills: 0 | Status: SHIPPED | OUTPATIENT
Start: 2019-08-23 | End: 2019-09-16 | Stop reason: SDUPTHER

## 2019-08-23 RX ORDER — TRAVOPROST 0.04 MG/ML
SOLUTION/ DROPS OPHTHALMIC
Qty: 2.5 ML | Refills: 0 | Status: SHIPPED | OUTPATIENT
Start: 2019-08-23 | End: 2019-09-16 | Stop reason: SDUPTHER

## 2019-09-06 NOTE — PROGRESS NOTES
Assessment /Plan     For exam results, see Encounter Report.    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

## 2019-09-16 ENCOUNTER — OFFICE VISIT (OUTPATIENT)
Dept: OPHTHALMOLOGY | Facility: CLINIC | Age: 69
End: 2019-09-16
Payer: MEDICARE

## 2019-09-16 DIAGNOSIS — Q15.0 CONGENITAL GLAUCOMA OF BOTH EYES: ICD-10-CM

## 2019-09-16 DIAGNOSIS — Z97.0 EYE GLOBE PROSTHESIS: ICD-10-CM

## 2019-09-16 DIAGNOSIS — Z94.7 TRANSPLANTED CORNEA: ICD-10-CM

## 2019-09-16 DIAGNOSIS — Z96.1 STATUS POST CATARACT EXTRACTION AND INSERTION OF INTRAOCULAR LENS OF RIGHT EYE: ICD-10-CM

## 2019-09-16 DIAGNOSIS — H40.31X3 GLAUCOMA OF RIGHT EYE ASSOCIATED WITH OCULAR TRAUMA, SEVERE STAGE: Primary | ICD-10-CM

## 2019-09-16 DIAGNOSIS — Z98.41 STATUS POST CATARACT EXTRACTION AND INSERTION OF INTRAOCULAR LENS OF RIGHT EYE: ICD-10-CM

## 2019-09-16 DIAGNOSIS — H40.1113 PRIMARY OPEN ANGLE GLAUCOMA (POAG) OF RIGHT EYE, SEVERE STAGE: ICD-10-CM

## 2019-09-16 PROCEDURE — 99999 PR PBB SHADOW E&M-EST. PATIENT-LVL II: ICD-10-PCS | Mod: PBBFAC,,, | Performed by: OPHTHALMOLOGY

## 2019-09-16 PROCEDURE — 92012 INTRM OPH EXAM EST PATIENT: CPT | Mod: S$GLB,,, | Performed by: OPHTHALMOLOGY

## 2019-09-16 PROCEDURE — 92012 PR EYE EXAM, EST PATIENT,INTERMED: ICD-10-PCS | Mod: S$GLB,,, | Performed by: OPHTHALMOLOGY

## 2019-09-16 PROCEDURE — 99999 PR PBB SHADOW E&M-EST. PATIENT-LVL II: CPT | Mod: PBBFAC,,, | Performed by: OPHTHALMOLOGY

## 2019-09-16 RX ORDER — TRAVOPROST OPHTHALMIC SOLUTION 0.04 MG/ML
1 SOLUTION OPHTHALMIC NIGHTLY
Qty: 2.5 ML | Refills: 6 | Status: SHIPPED | OUTPATIENT
Start: 2019-09-16 | End: 2020-12-09

## 2019-09-16 RX ORDER — FLUOROMETHOLONE 1 MG/ML
1 SUSPENSION/ DROPS OPHTHALMIC
Qty: 15 ML | Refills: 6 | Status: SHIPPED | OUTPATIENT
Start: 2019-09-16 | End: 2020-03-18

## 2019-09-23 DIAGNOSIS — H40.1113 PRIMARY OPEN ANGLE GLAUCOMA (POAG) OF RIGHT EYE, SEVERE STAGE: ICD-10-CM

## 2019-09-23 RX ORDER — DORZOLAMIDE HYDROCHLORIDE AND TIMOLOL MALEATE 20; 5 MG/ML; MG/ML
SOLUTION/ DROPS OPHTHALMIC
Qty: 10 ML | Refills: 0 | Status: SHIPPED | OUTPATIENT
Start: 2019-09-23 | End: 2019-11-19 | Stop reason: SDUPTHER

## 2019-10-07 ENCOUNTER — TELEPHONE (OUTPATIENT)
Dept: OPHTHALMOLOGY | Facility: CLINIC | Age: 69
End: 2019-10-07

## 2019-10-07 NOTE — TELEPHONE ENCOUNTER
----- Message from Jaspreet Lemus sent at 10/7/2019  9:21 AM CDT -----  Contact: nirav @ [phone]  Pt is calling to reschedule appt on 12/16 at [facility] to early January at [facility]

## 2019-11-19 DIAGNOSIS — H40.1113 PRIMARY OPEN ANGLE GLAUCOMA (POAG) OF RIGHT EYE, SEVERE STAGE: ICD-10-CM

## 2019-11-20 RX ORDER — DORZOLAMIDE HYDROCHLORIDE AND TIMOLOL MALEATE 20; 5 MG/ML; MG/ML
SOLUTION/ DROPS OPHTHALMIC
Qty: 10 ML | Refills: 0 | Status: SHIPPED | OUTPATIENT
Start: 2019-11-20 | End: 2020-01-20

## 2019-12-03 PROBLEM — N18.30 CHRONIC KIDNEY DISEASE, STAGE 3: Status: ACTIVE | Noted: 2019-12-03

## 2019-12-03 NOTE — PROGRESS NOTES
This note was created by combination of typed  and M-Modal dictation.  Transcription errors may be present.  If there are any questions, please contact me.    Assessment / Plan:   Essential hypertension  -stable. Future labs ordered  -     CBC auto differential; Future; Expected date: 06/01/2020  -     Comprehensive metabolic panel; Future; Expected date: 06/01/2020    Pure hypercholesterolemia  -stable on low dose lipitor future labs ordered  -     Lipid panel; Future; Expected date: 06/01/2020    Chronic kidney disease, stage 3  -advised to avoid NSAIDS; limit protein intake with meals. Monitor.    Need for shingles vaccine  -out of stock in the office, rx sent to pharmacy  -     varicella-zoster gE-AS01B, PF, (SHINGRIX, PF,) 50 mcg/0.5 mL injection; Inject 0.5 mLs into the muscle once. Repeat in 2 months for 1 dose  Dispense: 0.5 mL; Refill: 1          Medications Discontinued During This Encounter   Medication Reason    acetaZOLAMIDE (DIAMOX) 250 MG tablet Patient no longer taking    CHLO HIST 1-12.5 mg/5 mL Soln Patient no longer taking    pilocarpine HCL 4% (PILOCAR) 4 % ophthalmic solution Patient no longer taking       meds sent this encounter:  Medications Ordered This Encounter   Medications    varicella-zoster gE-AS01B, PF, (SHINGRIX, PF,) 50 mcg/0.5 mL injection     Sig: Inject 0.5 mLs into the muscle once. Repeat in 2 months for 1 dose     Dispense:  0.5 mL     Refill:  1       Follow Up: No follow-ups on file. OV 6 months previsit labs ordered. Recall entered      Subjective:     Chief Complaint   Patient presents with    Hypertension       PETER Syed is a 68 y.o. female, last appointment with this clinic was Visit date not found.    No LMP recorded. Patient is postmenopausal.    Last seen in May for PEx.  At that time labs:  Creatinine 1.0, possibly CKD stage 3, no previous for comparison except for 5 years previous.  Lipid profile good on low-dose atorvastatin no change.  Await old  records to compare creatinine, history vaccinations, history of colonoscopy, history of hepatitis C screening    10/15/18 creatinine 1.09    Need copy of colonoscopy  eROI sent.  She recalls having had it 2015. Reports no polyps    She has CKD stage 3. Discussed today.      Went on a cruise for [employer].     She recalls having had both pneumonia vaccinations.     No chest pain, no dyspnea, no pedal edema    Weight gain from cruise and thanksgiving. Over the last year    Physical activity - needs to increase.    Answers for HPI/ROS submitted by the patient on 12/2/2019   activity change: No  unexpected weight change: No  rhinorrhea: No  trouble swallowing: No  visual disturbance: No  chest tightness: No  polyuria: No  difficulty urinating: No  menstrual problem: No  joint swelling: No  arthralgias: No  confusion: No  dysphoric mood: No      Patient Care Team:  Raul Adkins MD as PCP - General (Internal Medicine)  Temi Ziegler MD as Consulting Physician (Internal Medicine)  Doroteo Beaver MD as Consulting Physician (Ophthalmology)  Jeremias Ca MD as Consulting Physician (Gastroenterology)  Georgie Ralph MD as Obstetrician (Obstetrics and Gynecology)  Veronica Baker MA as Care Coordinator  [facility] (Gastroenterology)    Patient Active Problem List    Diagnosis Date Noted    Chronic kidney disease, stage 3 12/03/2019    Pure hypercholesterolemia 05/29/2019    Essential hypertension 05/29/2019    Status post cholecystectomy 1998 still gets bile sludge in the bile duct; Dr. 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, severe stage 10/15/2018     6/20/19 MicroPulse G6 Laser Ciliary Body Destruction      Congenital glaucoma of both eyes 07/16/2018    Eye globe prosthesis 07/16/2018     Left eye      Status post cataract extraction and insertion of intraocular lens of right eye  2018    Transplanted cornea 2018     Right eye         PAST MEDICAL HISTORY:  Past Medical History:   Diagnosis Date    Cataract     Glaucoma (increased eye pressure)     H/O bilateral oophorectomy     Hx of cholecystectomy     Hypertension     Pure hypercholesterolemia     Thalassemia        PAST SURGICAL HISTORY:  Past Surgical History:   Procedure Laterality Date    BILATERAL OOPHORECTOMY      for cysts    CATARACT EXTRACTION Right     about     CHOLECYSTECTOMY  1998    LA REMOVAL OF OVARY/TUBE(S)      TRABECULECTOMY Right 2019    Procedure: G6/MP3 LASER;  Surgeon: Doroteo Beaver MD;  Location: [address];  Service: Ophthalmology;  Laterality: Right;       SOCIAL HISTORY:  Social History     Socioeconomic History    Marital status:      Spouse name: Not on file    Number of children: Not on file    Years of education: Not on file    Highest education level: Not on file   Occupational History    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   Social Needs    Financial resource strain: Not hard at all    Food insecurity:     Worry: Never true     Inability: Never true    Transportation needs:     Medical: No     Non-medical: No   Tobacco Use    Smoking status: Former Smoker     Packs/day: 0.30     Years: 11.00     Pack years: 3.30     Types: Cigarettes     Last attempt to quit: 2007     Years since quittin.3    Smokeless tobacco: Never Used   Substance and Sexual Activity    Alcohol use: Yes     Frequency: 2-4 times a month     Drinks per session: 1 or 2     Binge frequency: Never     Comment: social drinker    Drug use: No    Sexual activity: Yes     Partners: Male     Birth control/protection: Post-menopausal   Lifestyle    Physical activity:     Days per week: Not on file     Minutes per session: Not on file    Stress: Not on file   Relationships    Social connections:     Talks on  phone: Three times a week     Gets together: Twice a week     Attends [employer] service: Not on file     Active member of club or organization: No     Attends meetings of clubs or organizations: Not on file     Relationship status:    Other Topics Concern    Not on file   Social History Narrative    Not on file        ALLERGIES AND MEDICATIONS: updated and reviewed.  Review of patient's allergies indicates:  No Known Allergies  Current Outpatient Medications   Medication Sig Dispense Refill    atenolol-chlorthalidone (TENORETIC) 50-25 mg Tab Take 1 tablet by mouth every morning. 90 tablet 3    atorvastatin (LIPITOR) 10 MG tablet Take 1 tablet (10 mg total) by mouth once daily. 90 tablet 3    brimonidine 0.1% (ALPHAGAN P) 0.1 % Drop Place 1 drop into the right eye 3 (three) times daily. 45 mL 11    dorzolamide-timolol 2-0.5% (COSOPT) 22.3-6.8 mg/mL ophthalmic solution INSTILL 1 DROP INTO THE RIGHT EYE THREE TIMES DAILY 10 mL 0    fluorometholone 0.1% (FML) 0.1 % DrpS Place 1 drop into the right eye 5 (five) times daily. 15 mL 6    LUTEIN ORAL Take 1 tablet by mouth every morning.      potassium chloride (KLOR-CON) 10 MEQ TbSR Take 1 tablet (10 mEq total) by mouth once daily. 90 tablet 3    travoprost (TRAVATAN Z) 0.004 % ophthalmic solution Place 1 drop into the right eye every evening. 2.5 mL 6    vitamin E 400 UNIT capsule Take 400 Units by mouth every morning.       No current facility-administered medications for this visit.        Review of Systems   HENT: Negative for hearing loss.    Eyes: Negative for discharge.   Respiratory: Negative for wheezing.    Cardiovascular: Negative for chest pain and palpitations.   Gastrointestinal: Negative for blood in stool, constipation, diarrhea and vomiting.   Genitourinary: Negative for dysuria and hematuria.   Musculoskeletal: Negative for neck pain.   Neurological: Negative for weakness and headaches.   Endo/Heme/Allergies: Negative for polydipsia.  "      Objective:   Physical Exam   Vitals:    12/04/19 0901   Pulse: 63   Temp: 98 °F (36.7 °C)   TempSrc: Oral   SpO2: 99%   Weight: 69.9 kg (154 lb)   Height: 5' 2" (1.575 m)    Body mass index is 28.17 kg/m².  Weight: 69.9 kg (154 lb)   Height: 5' 2" (157.5 cm)     Physical Exam   Constitutional: She is oriented to person, place, and time. She appears well-developed and well-nourished. No distress.   Eyes:   L eye prosthesis   Cardiovascular: Normal rate, regular rhythm and normal heart sounds.   No murmur heard.  Pulmonary/Chest: Effort normal and breath sounds normal.   Musculoskeletal: Normal range of motion. She exhibits no edema.   Neurological: She is alert and oriented to person, place, and time. Coordination normal.   Skin: Skin is warm and dry.   Psychiatric: She has a normal mood and affect. Her behavior is normal. Thought content normal.     "

## 2019-12-04 ENCOUNTER — OFFICE VISIT (OUTPATIENT)
Dept: FAMILY MEDICINE | Facility: CLINIC | Age: 69
End: 2019-12-04
Payer: MEDICARE

## 2019-12-04 VITALS
HEART RATE: 63 BPM | OXYGEN SATURATION: 99 % | DIASTOLIC BLOOD PRESSURE: 74 MMHG | TEMPERATURE: 98 F | HEIGHT: 62 IN | SYSTOLIC BLOOD PRESSURE: 132 MMHG | BODY MASS INDEX: 28.34 KG/M2 | WEIGHT: 154 LBS

## 2019-12-04 DIAGNOSIS — I10 ESSENTIAL HYPERTENSION: Primary | ICD-10-CM

## 2019-12-04 DIAGNOSIS — E78.00 PURE HYPERCHOLESTEROLEMIA: ICD-10-CM

## 2019-12-04 DIAGNOSIS — Z23 NEED FOR SHINGLES VACCINE: ICD-10-CM

## 2019-12-04 DIAGNOSIS — N18.30 CHRONIC KIDNEY DISEASE, STAGE 3: ICD-10-CM

## 2019-12-04 PROCEDURE — 1159F MED LIST DOCD IN RCRD: CPT | Mod: S$GLB,,, | Performed by: INTERNAL MEDICINE

## 2019-12-04 PROCEDURE — 3075F PR MOST RECENT SYSTOLIC BLOOD PRESS GE 130-139MM HG: ICD-10-PCS | Mod: CPTII,S$GLB,, | Performed by: INTERNAL MEDICINE

## 2019-12-04 PROCEDURE — 99999 PR PBB SHADOW E&M-EST. PATIENT-LVL III: CPT | Mod: PBBFAC,,, | Performed by: INTERNAL MEDICINE

## 2019-12-04 PROCEDURE — 1126F AMNT PAIN NOTED NONE PRSNT: CPT | Mod: S$GLB,,, | Performed by: INTERNAL MEDICINE

## 2019-12-04 PROCEDURE — 99214 OFFICE O/P EST MOD 30 MIN: CPT | Mod: S$GLB,,, | Performed by: INTERNAL MEDICINE

## 2019-12-04 PROCEDURE — 99214 PR OFFICE/OUTPT VISIT, EST, LEVL IV, 30-39 MIN: ICD-10-PCS | Mod: S$GLB,,, | Performed by: INTERNAL MEDICINE

## 2019-12-04 PROCEDURE — 99499 UNLISTED E&M SERVICE: CPT | Mod: S$GLB,,, | Performed by: INTERNAL MEDICINE

## 2019-12-04 PROCEDURE — 99999 PR PBB SHADOW E&M-EST. PATIENT-LVL III: ICD-10-PCS | Mod: PBBFAC,,, | Performed by: INTERNAL MEDICINE

## 2019-12-04 PROCEDURE — 99499 RISK ADDL DX/OHS AUDIT: ICD-10-PCS | Mod: S$GLB,,, | Performed by: INTERNAL MEDICINE

## 2019-12-04 PROCEDURE — 3075F SYST BP GE 130 - 139MM HG: CPT | Mod: CPTII,S$GLB,, | Performed by: INTERNAL MEDICINE

## 2019-12-04 PROCEDURE — 1126F PR PAIN SEVERITY QUANTIFIED, NO PAIN PRESENT: ICD-10-PCS | Mod: S$GLB,,, | Performed by: INTERNAL MEDICINE

## 2019-12-04 PROCEDURE — 1159F PR MEDICATION LIST DOCUMENTED IN MEDICAL RECORD: ICD-10-PCS | Mod: S$GLB,,, | Performed by: INTERNAL MEDICINE

## 2019-12-04 PROCEDURE — 3078F DIAST BP <80 MM HG: CPT | Mod: CPTII,S$GLB,, | Performed by: INTERNAL MEDICINE

## 2019-12-04 PROCEDURE — 1101F PT FALLS ASSESS-DOCD LE1/YR: CPT | Mod: CPTII,S$GLB,, | Performed by: INTERNAL MEDICINE

## 2019-12-04 PROCEDURE — 1101F PR PT FALLS ASSESS DOC 0-1 FALLS W/OUT INJ PAST YR: ICD-10-PCS | Mod: CPTII,S$GLB,, | Performed by: INTERNAL MEDICINE

## 2019-12-04 PROCEDURE — 3078F PR MOST RECENT DIASTOLIC BLOOD PRESSURE < 80 MM HG: ICD-10-PCS | Mod: CPTII,S$GLB,, | Performed by: INTERNAL MEDICINE

## 2019-12-04 NOTE — LETTER
December 3, 2019    [facility]               [facility]  [address] [place] [address]  Phone: [phone]  Fax: [phone] AUTHORIZATION FOR RELEASE OF   CONFIDENTIAL INFORMATION     Dear Magdi LEIVA,     We are seeing Yahaira Hinson, date of birth 1950, in the clinic at [facility] FAMILY [facility]/ INTERNAL MED/ PEDS. Raul Adkins MD is the patient's PCP. Yahaira Hinson  has an outstanding lab/procedure at the time we reviewed her chart. In order to help keep her health information updated, she has authorized us to request the following medical record(s):          (  )  MAMMOGRAM                                      ( x )  COLONOSCOPY      (  )  PAP SMEAR                                          (  )  OUTSIDE LAB RESULTS      (  )  DEXA SCAN                                          (  )  EYE EXAM             (  )  FOOT EXAM                                          (  )  ENTIRE RECORD      (  )  OUTSIDE IMMUNIZATIONS                 (  )  _______________            Please fax records to [facility], Marvin P Dair, MD,  [phone]   [address] [place] [address]         If you have any questions, please contact Veronica Baker MA at [phone].

## 2019-12-04 NOTE — LETTER
December 3, 2019    Dr. Temi Ziegler               [facility]  [address] [address]  Phone: [phone]  Fax: [phone] December 3, 2019     Patient: Yahaira Hinson    YOB: 1950   Date of Visit: 12/4/2019     AUTHORIZATION FOR RELEASE OF   CONFIDENTIAL INFORMATION     Dear Dr. Ziegler/Staff,     We are seeing Yahaira Hinson, date of birth 1950, in the clinic at [facility] FAMILY MED/ INTERNAL MED/ PEDS. Raul Adkins MD is the patient's PCP. Yahaira Hinson  has an outstanding lab/procedure at the time we reviewed her chart. In order to help keep her health information updated, she has authorized us to request the following medical record(s):          (  )  MAMMOGRAM                                      (  )  COLONOSCOPY      (  )  PAP SMEAR                                          (  )  OUTSIDE LAB RESULTS      (  )  DEXA SCAN                                          (  )  EYE EXAM             (  )  FOOT EXAM                                          ( x )  ENTIRE RECORD      (  )  OUTSIDE IMMUNIZATIONS                 (  )  _______________            Please fax records to [facility], Marvin P Dair, MD,  [phone] [address] [place] [address]         If you have any questions, please contact DENIS Pitts at [phone]

## 2020-01-07 ENCOUNTER — PATIENT MESSAGE (OUTPATIENT)
Dept: OPHTHALMOLOGY | Facility: CLINIC | Age: 70
End: 2020-01-07

## 2020-01-08 ENCOUNTER — PATIENT OUTREACH (OUTPATIENT)
Dept: ADMINISTRATIVE | Facility: OTHER | Age: 70
End: 2020-01-08

## 2020-01-10 ENCOUNTER — TELEPHONE (OUTPATIENT)
Dept: OPHTHALMOLOGY | Facility: CLINIC | Age: 70
End: 2020-01-10

## 2020-01-10 NOTE — TELEPHONE ENCOUNTER
----- Message from Gwen Devlin sent at 1/10/2020  8:18 AM CST -----  Contact: Yahaira  Pt stated she cannot make her appt on today and needed to reschedule. My first available appt isn't until 3/12 pt stated she cannot wait that long. Pt stated she needed to speak with someone in the office. Pt contact number is [phone].

## 2020-01-13 NOTE — PROGRESS NOTES
Assessment /Plan     For exam results, see Encounter Report.    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

## 2020-01-16 ENCOUNTER — PATIENT OUTREACH (OUTPATIENT)
Dept: ADMINISTRATIVE | Facility: HOSPITAL | Age: 70
End: 2020-01-16

## 2020-01-16 PROBLEM — K21.00 GERD WITH ESOPHAGITIS: Status: ACTIVE | Noted: 2020-01-16

## 2020-01-16 PROBLEM — Z12.11 SCREENING FOR COLON CANCER: Status: ACTIVE | Noted: 2020-01-16

## 2020-01-20 DIAGNOSIS — H40.1113 PRIMARY OPEN ANGLE GLAUCOMA (POAG) OF RIGHT EYE, SEVERE STAGE: ICD-10-CM

## 2020-01-20 RX ORDER — BRIMONIDINE TARTRATE 1 MG/ML
SOLUTION/ DROPS OPHTHALMIC
Qty: 45 ML | Refills: 11 | Status: SHIPPED | OUTPATIENT
Start: 2020-01-20 | End: 2020-08-17 | Stop reason: SDUPTHER

## 2020-01-20 RX ORDER — DORZOLAMIDE HYDROCHLORIDE AND TIMOLOL MALEATE 20; 5 MG/ML; MG/ML
SOLUTION/ DROPS OPHTHALMIC
Qty: 10 ML | Refills: 0 | Status: SHIPPED | OUTPATIENT
Start: 2020-01-20 | End: 2020-03-18

## 2020-01-22 ENCOUNTER — PATIENT OUTREACH (OUTPATIENT)
Dept: ADMINISTRATIVE | Facility: OTHER | Age: 70
End: 2020-01-22

## 2020-01-24 ENCOUNTER — OFFICE VISIT (OUTPATIENT)
Dept: OPHTHALMOLOGY | Facility: CLINIC | Age: 70
End: 2020-01-24
Payer: MEDICARE

## 2020-01-24 DIAGNOSIS — Z96.1 STATUS POST CATARACT EXTRACTION AND INSERTION OF INTRAOCULAR LENS OF RIGHT EYE: ICD-10-CM

## 2020-01-24 DIAGNOSIS — Z97.0 EYE GLOBE PROSTHESIS: ICD-10-CM

## 2020-01-24 DIAGNOSIS — H40.31X3 GLAUCOMA OF RIGHT EYE ASSOCIATED WITH OCULAR TRAUMA, SEVERE STAGE: Primary | ICD-10-CM

## 2020-01-24 DIAGNOSIS — Z98.41 STATUS POST CATARACT EXTRACTION AND INSERTION OF INTRAOCULAR LENS OF RIGHT EYE: ICD-10-CM

## 2020-01-24 DIAGNOSIS — Q15.0 CONGENITAL GLAUCOMA OF BOTH EYES: ICD-10-CM

## 2020-01-24 PROCEDURE — 99999 PR PBB SHADOW E&M-EST. PATIENT-LVL II: ICD-10-PCS | Mod: PBBFAC,,, | Performed by: OPHTHALMOLOGY

## 2020-01-24 PROCEDURE — 92012 INTRM OPH EXAM EST PATIENT: CPT | Mod: S$GLB,,, | Performed by: OPHTHALMOLOGY

## 2020-01-24 PROCEDURE — 92012 PR EYE EXAM, EST PATIENT,INTERMED: ICD-10-PCS | Mod: S$GLB,,, | Performed by: OPHTHALMOLOGY

## 2020-01-24 PROCEDURE — 99999 PR PBB SHADOW E&M-EST. PATIENT-LVL II: CPT | Mod: PBBFAC,,, | Performed by: OPHTHALMOLOGY

## 2020-01-24 RX ORDER — LATANOPROST 50 UG/ML
1 SOLUTION/ DROPS OPHTHALMIC NIGHTLY
Qty: 7.5 ML | Refills: 4 | Status: SHIPPED | OUTPATIENT
Start: 2020-01-24 | End: 2020-08-17 | Stop reason: SDUPTHER

## 2020-01-24 RX ORDER — LATANOPROST 50 UG/ML
1 SOLUTION/ DROPS OPHTHALMIC NIGHTLY
COMMUNITY
End: 2020-01-24 | Stop reason: SDUPTHER

## 2020-03-18 ENCOUNTER — PATIENT MESSAGE (OUTPATIENT)
Dept: FAMILY MEDICINE | Facility: CLINIC | Age: 70
End: 2020-03-18

## 2020-03-18 DIAGNOSIS — H40.31X3 GLAUCOMA OF RIGHT EYE ASSOCIATED WITH OCULAR TRAUMA, SEVERE STAGE: ICD-10-CM

## 2020-03-18 DIAGNOSIS — H40.1113 PRIMARY OPEN ANGLE GLAUCOMA (POAG) OF RIGHT EYE, SEVERE STAGE: ICD-10-CM

## 2020-03-18 RX ORDER — DORZOLAMIDE HYDROCHLORIDE AND TIMOLOL MALEATE 20; 5 MG/ML; MG/ML
SOLUTION/ DROPS OPHTHALMIC
Qty: 10 ML | Refills: 0 | Status: SHIPPED | OUTPATIENT
Start: 2020-03-18 | End: 2020-05-19

## 2020-03-18 RX ORDER — FLUOROMETHOLONE 1 MG/ML
SUSPENSION/ DROPS OPHTHALMIC
Qty: 10 ML | Refills: 2 | Status: SHIPPED | OUTPATIENT
Start: 2020-03-18 | End: 2020-08-17 | Stop reason: SDUPTHER

## 2020-05-19 DIAGNOSIS — H40.1113 PRIMARY OPEN ANGLE GLAUCOMA (POAG) OF RIGHT EYE, SEVERE STAGE: ICD-10-CM

## 2020-05-19 RX ORDER — DORZOLAMIDE HYDROCHLORIDE AND TIMOLOL MALEATE 20; 5 MG/ML; MG/ML
SOLUTION/ DROPS OPHTHALMIC
Qty: 10 ML | Refills: 0 | Status: SHIPPED | OUTPATIENT
Start: 2020-05-19 | End: 2020-07-21

## 2020-07-06 ENCOUNTER — PATIENT MESSAGE (OUTPATIENT)
Dept: FAMILY MEDICINE | Facility: CLINIC | Age: 70
End: 2020-07-06

## 2020-07-09 ENCOUNTER — TELEPHONE (OUTPATIENT)
Dept: FAMILY MEDICINE | Facility: CLINIC | Age: 70
End: 2020-07-09

## 2020-07-09 ENCOUNTER — LAB VISIT (OUTPATIENT)
Dept: LAB | Facility: HOSPITAL | Age: 70
End: 2020-07-09
Attending: INTERNAL MEDICINE
Payer: MEDICARE

## 2020-07-09 DIAGNOSIS — E78.00 PURE HYPERCHOLESTEROLEMIA: ICD-10-CM

## 2020-07-09 DIAGNOSIS — Z00.00 NORMAL PHYSICAL EXAM: Primary | ICD-10-CM

## 2020-07-09 DIAGNOSIS — I10 ESSENTIAL HYPERTENSION: ICD-10-CM

## 2020-07-09 DIAGNOSIS — D64.9 ANEMIA, UNSPECIFIED TYPE: ICD-10-CM

## 2020-07-09 DIAGNOSIS — Z11.59 NEED FOR HEPATITIS C SCREENING TEST: ICD-10-CM

## 2020-07-09 LAB
ALBUMIN SERPL BCP-MCNC: 4.3 G/DL (ref 3.5–5.2)
ALP SERPL-CCNC: 110 U/L (ref 55–135)
ALT SERPL W/O P-5'-P-CCNC: 32 U/L (ref 10–44)
ANION GAP SERPL CALC-SCNC: 8 MMOL/L (ref 8–16)
AST SERPL-CCNC: 27 U/L (ref 10–40)
BASOPHILS # BLD AUTO: 0.05 K/UL (ref 0–0.2)
BASOPHILS NFR BLD: 0.9 % (ref 0–1.9)
BILIRUB SERPL-MCNC: 0.7 MG/DL (ref 0.1–1)
BUN SERPL-MCNC: 16 MG/DL (ref 8–23)
CALCIUM SERPL-MCNC: 10.2 MG/DL (ref 8.7–10.5)
CHLORIDE SERPL-SCNC: 103 MMOL/L (ref 95–110)
CHOLEST SERPL-MCNC: 167 MG/DL (ref 120–199)
CHOLEST/HDLC SERPL: 2.4 {RATIO} (ref 2–5)
CO2 SERPL-SCNC: 30 MMOL/L (ref 23–29)
CREAT SERPL-MCNC: 1 MG/DL (ref 0.5–1.4)
DIFFERENTIAL METHOD: ABNORMAL
EOSINOPHIL # BLD AUTO: 0.2 K/UL (ref 0–0.5)
EOSINOPHIL NFR BLD: 4.3 % (ref 0–8)
ERYTHROCYTE [DISTWIDTH] IN BLOOD BY AUTOMATED COUNT: 15.8 % (ref 11.5–14.5)
EST. GFR  (AFRICAN AMERICAN): >60 ML/MIN/1.73 M^2
EST. GFR  (NON AFRICAN AMERICAN): 58 ML/MIN/1.73 M^2
FERRITIN SERPL-MCNC: 337 NG/ML (ref 20–300)
GLUCOSE SERPL-MCNC: 100 MG/DL (ref 70–110)
HCT VFR BLD AUTO: 35 % (ref 37–48.5)
HDLC SERPL-MCNC: 69 MG/DL (ref 40–75)
HDLC SERPL: 41.3 % (ref 20–50)
HGB BLD-MCNC: 10.6 G/DL (ref 12–16)
IMM GRANULOCYTES # BLD AUTO: 0.02 K/UL (ref 0–0.04)
IMM GRANULOCYTES NFR BLD AUTO: 0.4 % (ref 0–0.5)
LDLC SERPL CALC-MCNC: 78.8 MG/DL (ref 63–159)
LYMPHOCYTES # BLD AUTO: 2.1 K/UL (ref 1–4.8)
LYMPHOCYTES NFR BLD: 37.7 % (ref 18–48)
MCH RBC QN AUTO: 20.8 PG (ref 27–31)
MCHC RBC AUTO-ENTMCNC: 30.3 G/DL (ref 32–36)
MCV RBC AUTO: 69 FL (ref 82–98)
MONOCYTES # BLD AUTO: 0.5 K/UL (ref 0.3–1)
MONOCYTES NFR BLD: 9.3 % (ref 4–15)
NEUTROPHILS # BLD AUTO: 2.6 K/UL (ref 1.8–7.7)
NEUTROPHILS NFR BLD: 47.4 % (ref 38–73)
NONHDLC SERPL-MCNC: 98 MG/DL
NRBC BLD-RTO: 0 /100 WBC
PLATELET # BLD AUTO: 217 K/UL (ref 150–350)
PMV BLD AUTO: 9.8 FL (ref 9.2–12.9)
POTASSIUM SERPL-SCNC: 4 MMOL/L (ref 3.5–5.1)
PROT SERPL-MCNC: 7.5 G/DL (ref 6–8.4)
RBC # BLD AUTO: 5.1 M/UL (ref 4–5.4)
SODIUM SERPL-SCNC: 141 MMOL/L (ref 136–145)
TRIGL SERPL-MCNC: 96 MG/DL (ref 30–150)
WBC # BLD AUTO: 5.57 K/UL (ref 3.9–12.7)

## 2020-07-09 PROCEDURE — 86803 HEPATITIS C AB TEST: CPT

## 2020-07-09 PROCEDURE — 82728 ASSAY OF FERRITIN: CPT

## 2020-07-09 PROCEDURE — 85025 COMPLETE CBC W/AUTO DIFF WBC: CPT

## 2020-07-09 PROCEDURE — 36415 COLL VENOUS BLD VENIPUNCTURE: CPT

## 2020-07-09 PROCEDURE — 80061 LIPID PANEL: CPT

## 2020-07-09 PROCEDURE — 80053 COMPREHEN METABOLIC PANEL: CPT

## 2020-07-10 LAB — HCV AB SERPL QL IA: NEGATIVE

## 2020-07-21 NOTE — PROGRESS NOTES
This note was created by combination of typed  and M-Modal dictation.  Transcription errors may be present.  If there are any questions, please contact me.    Assessment and Plan:   Normal physical exam  Screening for colon cancer 08/17/2015 colonoscopy diverticulosis entire colon.  Grade 1 internal hemorrhoids.  -previsit labs reviewed.  Stable.   Followed by gyn; due for DEXA, was ordered by gyn, she gets this done at [facility]    Essential hypertension  -stable, not checking regularly, has home cuff, encouraged to check weekly  bp at goal today  Refilled atenolol/chlorthalidone and once daily K Dur  -     atenoloL-chlorthalidone (TENORETIC) 50-25 mg Tab; Take 1 tablet by mouth every morning.  Dispense: 90 tablet; Refill: 3  -     potassium chloride (KLOR-CON) 10 MEQ TbSR; Take 1 tablet (10 mEq total) by mouth once daily.  Dispense: 90 tablet; Refill: 3    Chronic kidney disease, stage 3  -stable. Repeat labs 6 months.  -     Comprehensive metabolic panel; Future; Expected date: 01/22/2021  -     CBC auto differential; Future; Expected date: 01/22/2021    Pure hypercholesterolemia  -lipid good no change, repeat 6 months; refill low dose lipitor.  -     atorvastatin (LIPITOR) 10 MG tablet; Take 1 tablet (10 mg total) by mouth once daily.  Dispense: 90 tablet; Refill: 3  -     Lipid Panel; Future; Expected date: 01/22/2021    Thalassemia, unspecified type  -stable. Recent ferritin OK - not iron deficient.  -     CBC auto differential; Future; Expected date: 01/22/2021    Need for vaccination for Strep pneumoniae  -prevnar 13 today. UTD pneumovax 2017  -     Cancel: (In Office Administered) Pneumococcal Polysaccharide Vaccine (23 Valent) (SQ/IM)  -     (In Office Administered) Pneumococcal Conjugate Vaccine (13 Valent) (IM)      Medications Discontinued During This Encounter   Medication Reason    potassium chloride (KLOR-CON) 10 MEQ TbSR Reorder    atorvastatin (LIPITOR) 10 MG tablet Reorder     atenolol-chlorthalidone (TENORETIC) 50-25 mg Tab Reorder       meds sent this encounter:  Medications Ordered This Encounter   Medications    atenoloL-chlorthalidone (TENORETIC) 50-25 mg Tab     Sig: Take 1 tablet by mouth every morning.     Dispense:  90 tablet     Refill:  3     .    atorvastatin (LIPITOR) 10 MG tablet     Sig: Take 1 tablet (10 mg total) by mouth once daily.     Dispense:  90 tablet     Refill:  3    potassium chloride (KLOR-CON) 10 MEQ TbSR     Sig: Take 1 tablet (10 mEq total) by mouth once daily.     Dispense:  90 tablet     Refill:  3       Follow Up: No follow-ups on file. f/u 6 months with previsit labs ordered, recall entered      Subjective:     Chief Complaint   Patient presents with    Annual Exam       PETER Syed is a 69 y.o. female, last appointment with this clinic was Visit date not found.    No LMP recorded. Patient is postmenopausal.    Last visit with me in December  Hypertension, chronic kidney disease, dyslipidemia on statin    Pre visit labs anemia known thalassemia.  Ferritin was high.  Discounting iron deficiency  CMP creatinine 1.0 stable  Lipid good  Screening hep C negative    Needs bone density scan  Needs Prevnar.    No home BP checks. Has a home cuff.  Encouraged to monitor weekly.  Denies issues with medications.  Reports compliance.    No chest pain no headaches, no leg cramping, no swelling.     2015 DEXA with osteopenia.  Done at [facility].  Ordered 10/2019 by her gyn.    Answers for HPI/ROS submitted by the patient on 7/20/2020   activity change: No  unexpected weight change: No  rhinorrhea: No  trouble swallowing: No  visual disturbance: No  chest tightness: No  polyuria: No  difficulty urinating: No  menstrual problem: No  joint swelling: No  arthralgias: No  confusion: No  dysphoric mood: No      Patient Care Team:  Raul Adkins MD as PCP - General (Internal Medicine)  Temi Ziegler MD as Consulting Physician (Internal Medicine)  Doroteo Beaver,  MD as Consulting Physician (Ophthalmology)  Jeremias Ca MD as Consulting Physician (Gastroenterology)  Georgie Ralph MD as Obstetrician (Obstetrics and Gynecology)  Veronica Baker MA as Care Coordinator  [facility] (Gastroenterology)    Patient Active Problem List    Diagnosis Date Noted    GERD with esophagitis on EGD 8/2015 01/16/2020 08/17/2015 EGD irregular Z-line biopsies of duodenum and stomach and GE junction obtained.  Biopsy showed mild chronic esophagitis.  Mild chronic gastritis.  H pylori negative.  Duodenum normal.      Screening for colon cancer 08/17/2015 colonoscopy diverticulosis entire colon.  Grade 1 internal hemorrhoids. 01/16/2020 08/17/2015 colonoscopy diverticulosis entire colon.  Grade 1 internal hemorrhoids.      Chronic kidney disease, stage 3 12/03/2019    Pure hypercholesterolemia 05/29/2019    Essential hypertension 05/29/2019    Status post cholecystectomy 1998 still gets bile sludge in the bile duct; Dr. 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     Right eye         PAST MEDICAL HISTORY:  Past Medical History:   Diagnosis Date    Cataract     Glaucoma (increased eye pressure)     H/O bilateral oophorectomy     Hx of cholecystectomy     Hypertension     Pure hypercholesterolemia     Thalassemia        PAST SURGICAL HISTORY:  Past Surgical History:   Procedure Laterality Date    BILATERAL OOPHORECTOMY      for cysts    CATARACT EXTRACTION Right     about 1998    CHOLECYSTECTOMY  1998    MO REMOVAL OF OVARY/TUBE(S)      TRABECULECTOMY Right  2019    Procedure: G6/MP3 LASER;  Surgeon: Doroteo Beaver MD;  Location: [address];  Service: Ophthalmology;  Laterality: Right;       SOCIAL HISTORY:  Social History     Socioeconomic History    Marital status:      Spouse name: Not on file    Number of children: Not on file    Years of education: Not on file    Highest education level: Not on file   Occupational History    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   Social Needs    Financial resource strain: Not hard at all    Food insecurity     Worry: Never true     Inability: Never true    Transportation needs     Medical: No     Non-medical: No   Tobacco Use    Smoking status: Former Smoker     Packs/day: 0.30     Years: 11.00     Pack years: 3.30     Types: Cigarettes     Quit date: 2007     Years since quittin.9    Smokeless tobacco: Never Used   Substance and Sexual Activity    Alcohol use: Yes     Frequency: 2-4 times a month     Drinks per session: 1 or 2     Binge frequency: Never     Comment: social drinker    Drug use: No    Sexual activity: Yes     Partners: Male     Birth control/protection: Post-menopausal   Lifestyle    Physical activity     Days per week: Not on file     Minutes per session: Not on file    Stress: Not on file   Relationships    Social connections     Talks on phone: Three times a week     Gets together: Twice a week     Attends Hoahaoism service: Not on file     Active member of club or organization: No     Attends meetings of clubs or organizations: Not on file     Relationship status:    Other Topics Concern    Not on file   Social History Narrative    Not on file        ALLERGIES AND MEDICATIONS: updated and reviewed.  Review of patient's allergies indicates:  No Known Allergies  Current Outpatient Medications   Medication Sig Dispense Refill    ALPHAGAN P 0.1 % Drop INSTILL 1 DROP IN RIGHT EYE THREE TIMES DAILY 45  "mL 11    atenolol-chlorthalidone (TENORETIC) 50-25 mg Tab Take 1 tablet by mouth every morning. 90 tablet 3    atorvastatin (LIPITOR) 10 MG tablet Take 1 tablet (10 mg total) by mouth once daily. 90 tablet 3    dorzolamide-timolol 2-0.5% (COSOPT) 22.3-6.8 mg/mL ophthalmic solution INSTILL 1 DROP INTO RIGHT EYE THREE TIMES DAILY 10 mL 0    fluorometholone 0.1% (FML) 0.1 % DrpS INSTILL 1 DROP INTO THE RIGHT EYE ONCE DAILY 10 mL 2    latanoprost 0.005 % ophthalmic solution Place 1 drop into both eyes every evening. 7.5 mL 4    LUTEIN ORAL Take 1 tablet by mouth every morning.      potassium chloride (KLOR-CON) 10 MEQ TbSR Take 1 tablet (10 mEq total) by mouth once daily. 90 tablet 3    vitamin E 400 UNIT capsule Take 400 Units by mouth every morning.      travoprost (TRAVATAN Z) 0.004 % ophthalmic solution Place 1 drop into the right eye every evening. 2.5 mL 6     No current facility-administered medications for this visit.        Review of Systems   HENT: Negative for hearing loss.    Eyes: Negative for discharge.   Respiratory: Negative for wheezing.    Cardiovascular: Negative for chest pain and palpitations.   Gastrointestinal: Negative for blood in stool, constipation, diarrhea and vomiting.   Genitourinary: Negative for dysuria and hematuria.   Musculoskeletal: Negative for neck pain.   Neurological: Negative for weakness and headaches.   Endo/Heme/Allergies: Negative for polydipsia.       Objective:   Physical Exam   Vitals:    07/22/20 0909   BP: 134/60   BP Location: Right arm   Patient Position: Sitting   BP Method: Medium (Manual)   Pulse: 63   Temp: 97 °F (36.1 °C)   TempSrc: Temporal   SpO2: 99%   Weight: 55.8 kg (123 lb)   Height: 5' 2" (1.575 m)    Body mass index is 22.5 kg/m².  Weight: 55.8 kg (123 lb)   Height: 5' 2" (157.5 cm)     Physical Exam  Constitutional:       Appearance: She is well-developed.   HENT:      Right Ear: Tympanic membrane, ear canal and external ear normal.      Left " Ear: Tympanic membrane, ear canal and external ear normal.   Eyes:      Comments: Left eye prosthesis   Neck:      Musculoskeletal: Neck supple.      Thyroid: No thyromegaly.   Cardiovascular:      Rate and Rhythm: Normal rate and regular rhythm.      Heart sounds: Normal heart sounds. No murmur.   Pulmonary:      Effort: Pulmonary effort is normal.      Breath sounds: Normal breath sounds. No wheezing.   Abdominal:      Palpations: Abdomen is soft. There is no hepatomegaly, splenomegaly or mass.      Tenderness: There is no abdominal tenderness.   Musculoskeletal: Normal range of motion.         General: No deformity.      Right lower leg: No edema.      Left lower leg: No edema.   Lymphadenopathy:      Cervical: No cervical adenopathy.   Skin:     General: Skin is warm and dry.      Findings: No rash.      Comments: On exposed skin   Neurological:      Mental Status: She is alert and oriented to person, place, and time.      Deep Tendon Reflexes: Reflexes are normal and symmetric.   Psychiatric:         Behavior: Behavior normal.         Thought Content: Thought content normal.         Judgment: Judgment normal.       Component      Latest Ref Rng & Units 7/9/2020   WBC      3.90 - 12.70 K/uL 5.57   RBC      4.00 - 5.40 M/uL 5.10   Hemoglobin      12.0 - 16.0 g/dL 10.6 (L)   Hematocrit      37.0 - 48.5 % 35.0 (L)   MCV      82 - 98 fL 69 (L)   MCH      27.0 - 31.0 pg 20.8 (L)   MCHC      32.0 - 36.0 g/dL 30.3 (L)   RDW      11.5 - 14.5 % 15.8 (H)   Platelets      150 - 350 K/uL 217   MPV      9.2 - 12.9 fL 9.8   Immature Granulocytes      0.0 - 0.5 % 0.4   Gran # (ANC)      1.8 - 7.7 K/uL 2.6   Immature Grans (Abs)      0.00 - 0.04 K/uL 0.02   Lymph #      1.0 - 4.8 K/uL 2.1   Mono #      0.3 - 1.0 K/uL 0.5   Eos #      0.0 - 0.5 K/uL 0.2   Baso #      0.00 - 0.20 K/uL 0.05   nRBC      0 /100 WBC 0   Gran%      38.0 - 73.0 % 47.4   Lymph%      18.0 - 48.0 % 37.7   Mono%      4.0 - 15.0 % 9.3   Eosinophil%      0.0  - 8.0 % 4.3   Basophil%      0.0 - 1.9 % 0.9   Differential Method       Automated   Sodium      136 - 145 mmol/L 141   Potassium      3.5 - 5.1 mmol/L 4.0   Chloride      95 - 110 mmol/L 103   CO2      23 - 29 mmol/L 30 (H)   Glucose      70 - 110 mg/dL 100   BUN, Bld      8 - 23 mg/dL 16   Creatinine      0.5 - 1.4 mg/dL 1.0   Calcium      8.7 - 10.5 mg/dL 10.2   PROTEIN TOTAL      6.0 - 8.4 g/dL 7.5   Albumin      3.5 - 5.2 g/dL 4.3   BILIRUBIN TOTAL      0.1 - 1.0 mg/dL 0.7   Alkaline Phosphatase      55 - 135 U/L 110   AST      10 - 40 U/L 27   ALT      10 - 44 U/L 32   Anion Gap      8 - 16 mmol/L 8   eGFR if African American      >60 mL/min/1.73 m:2 >60   eGFR if non African American      >60 mL/min/1.73 m:2 58 (A)   Cholesterol      120 - 199 mg/dL 167   Triglycerides      30 - 150 mg/dL 96   HDL      40 - 75 mg/dL 69   LDL Cholesterol External      63.0 - 159.0 mg/dL 78.8   Hdl/Cholesterol Ratio      20.0 - 50.0 % 41.3   Total Cholesterol/HDL Ratio      2.0 - 5.0 2.4   Non-HDL Cholesterol      mg/dL 98   Hepatitis C Ab      Negative Negative   Ferritin      20.0 - 300.0 ng/mL 337 (H)

## 2020-07-22 ENCOUNTER — OFFICE VISIT (OUTPATIENT)
Dept: FAMILY MEDICINE | Facility: CLINIC | Age: 70
End: 2020-07-22
Payer: MEDICARE

## 2020-07-22 VITALS
BODY MASS INDEX: 22.63 KG/M2 | HEIGHT: 62 IN | SYSTOLIC BLOOD PRESSURE: 134 MMHG | OXYGEN SATURATION: 99 % | TEMPERATURE: 97 F | WEIGHT: 123 LBS | DIASTOLIC BLOOD PRESSURE: 60 MMHG | HEART RATE: 63 BPM

## 2020-07-22 DIAGNOSIS — E78.00 PURE HYPERCHOLESTEROLEMIA: ICD-10-CM

## 2020-07-22 DIAGNOSIS — D56.9 THALASSEMIA, UNSPECIFIED TYPE: ICD-10-CM

## 2020-07-22 DIAGNOSIS — Z12.11 SCREENING FOR COLON CANCER: ICD-10-CM

## 2020-07-22 DIAGNOSIS — Z00.00 NORMAL PHYSICAL EXAM: ICD-10-CM

## 2020-07-22 DIAGNOSIS — I10 ESSENTIAL HYPERTENSION: ICD-10-CM

## 2020-07-22 DIAGNOSIS — N18.30 CHRONIC KIDNEY DISEASE, STAGE 3: ICD-10-CM

## 2020-07-22 DIAGNOSIS — Z23 NEED FOR VACCINATION FOR STREP PNEUMONIAE: Primary | ICD-10-CM

## 2020-07-22 PROCEDURE — 3078F DIAST BP <80 MM HG: CPT | Mod: CPTII,S$GLB,, | Performed by: INTERNAL MEDICINE

## 2020-07-22 PROCEDURE — 99999 PR PBB SHADOW E&M-EST. PATIENT-LVL IV: ICD-10-PCS | Mod: PBBFAC,,, | Performed by: INTERNAL MEDICINE

## 2020-07-22 PROCEDURE — 99214 OFFICE O/P EST MOD 30 MIN: CPT | Mod: 25,S$GLB,, | Performed by: INTERNAL MEDICINE

## 2020-07-22 PROCEDURE — G0009 ADMIN PNEUMOCOCCAL VACCINE: HCPCS | Mod: S$GLB,,, | Performed by: INTERNAL MEDICINE

## 2020-07-22 PROCEDURE — 99499 RISK ADDL DX/OHS AUDIT: ICD-10-PCS | Mod: S$GLB,,, | Performed by: INTERNAL MEDICINE

## 2020-07-22 PROCEDURE — 99499 UNLISTED E&M SERVICE: CPT | Mod: S$GLB,,, | Performed by: INTERNAL MEDICINE

## 2020-07-22 PROCEDURE — 99214 PR OFFICE/OUTPT VISIT, EST, LEVL IV, 30-39 MIN: ICD-10-PCS | Mod: 25,S$GLB,, | Performed by: INTERNAL MEDICINE

## 2020-07-22 PROCEDURE — 3075F SYST BP GE 130 - 139MM HG: CPT | Mod: CPTII,S$GLB,, | Performed by: INTERNAL MEDICINE

## 2020-07-22 PROCEDURE — G0009 PNEUMOCOCCAL CONJUGATE VACCINE 13-VALENT LESS THAN 5YO & GREATER THAN: ICD-10-PCS | Mod: S$GLB,,, | Performed by: INTERNAL MEDICINE

## 2020-07-22 PROCEDURE — 3078F PR MOST RECENT DIASTOLIC BLOOD PRESSURE < 80 MM HG: ICD-10-PCS | Mod: CPTII,S$GLB,, | Performed by: INTERNAL MEDICINE

## 2020-07-22 PROCEDURE — 90670 PCV13 VACCINE IM: CPT | Mod: S$GLB,,, | Performed by: INTERNAL MEDICINE

## 2020-07-22 PROCEDURE — 3075F PR MOST RECENT SYSTOLIC BLOOD PRESS GE 130-139MM HG: ICD-10-PCS | Mod: CPTII,S$GLB,, | Performed by: INTERNAL MEDICINE

## 2020-07-22 PROCEDURE — 99999 PR PBB SHADOW E&M-EST. PATIENT-LVL IV: CPT | Mod: PBBFAC,,, | Performed by: INTERNAL MEDICINE

## 2020-07-22 PROCEDURE — 90670 PNEUMOCOCCAL CONJUGATE VACCINE 13-VALENT LESS THAN 5YO & GREATER THAN: ICD-10-PCS | Mod: S$GLB,,, | Performed by: INTERNAL MEDICINE

## 2020-07-22 RX ORDER — ATENOLOL AND CHLORTHALIDONE TABLET 50; 25 MG/1; MG/1
1 TABLET ORAL EVERY MORNING
Qty: 90 TABLET | Refills: 3 | Status: SHIPPED | OUTPATIENT
Start: 2020-07-22 | End: 2021-01-21 | Stop reason: SDUPTHER

## 2020-07-22 RX ORDER — POTASSIUM CHLORIDE 750 MG/1
10 TABLET, EXTENDED RELEASE ORAL DAILY
Qty: 90 TABLET | Refills: 3 | Status: SHIPPED | OUTPATIENT
Start: 2020-07-22 | End: 2021-01-21 | Stop reason: SDUPTHER

## 2020-07-22 RX ORDER — ATORVASTATIN CALCIUM 10 MG/1
10 TABLET, FILM COATED ORAL DAILY
Qty: 90 TABLET | Refills: 3 | Status: SHIPPED | OUTPATIENT
Start: 2020-07-22 | End: 2021-01-21 | Stop reason: SDUPTHER

## 2020-08-17 ENCOUNTER — OFFICE VISIT (OUTPATIENT)
Dept: OPHTHALMOLOGY | Facility: CLINIC | Age: 70
End: 2020-08-17
Payer: MEDICARE

## 2020-08-17 ENCOUNTER — PATIENT OUTREACH (OUTPATIENT)
Dept: ADMINISTRATIVE | Facility: OTHER | Age: 70
End: 2020-08-17

## 2020-08-17 DIAGNOSIS — Z94.7 TRANSPLANTED CORNEA: ICD-10-CM

## 2020-08-17 DIAGNOSIS — Z96.1 STATUS POST CATARACT EXTRACTION AND INSERTION OF INTRAOCULAR LENS OF RIGHT EYE: ICD-10-CM

## 2020-08-17 DIAGNOSIS — Z97.0 EYE GLOBE PROSTHESIS: ICD-10-CM

## 2020-08-17 DIAGNOSIS — H40.1113 PRIMARY OPEN ANGLE GLAUCOMA (POAG) OF RIGHT EYE, SEVERE STAGE: ICD-10-CM

## 2020-08-17 DIAGNOSIS — Z98.41 STATUS POST CATARACT EXTRACTION AND INSERTION OF INTRAOCULAR LENS OF RIGHT EYE: ICD-10-CM

## 2020-08-17 DIAGNOSIS — Q15.0 CONGENITAL GLAUCOMA OF BOTH EYES: ICD-10-CM

## 2020-08-17 DIAGNOSIS — H40.31X3 GLAUCOMA OF RIGHT EYE ASSOCIATED WITH OCULAR TRAUMA, SEVERE STAGE: Primary | ICD-10-CM

## 2020-08-17 DIAGNOSIS — H40.31X3 GLAUCOMA OF RIGHT EYE ASSOCIATED WITH OCULAR TRAUMA, SEVERE STAGE: ICD-10-CM

## 2020-08-17 PROCEDURE — 99999 PR PBB SHADOW E&M-EST. PATIENT-LVL III: ICD-10-PCS | Mod: PBBFAC,,, | Performed by: OPHTHALMOLOGY

## 2020-08-17 PROCEDURE — 92012 INTRM OPH EXAM EST PATIENT: CPT | Mod: S$GLB,,, | Performed by: OPHTHALMOLOGY

## 2020-08-17 PROCEDURE — 92012 PR EYE EXAM, EST PATIENT,INTERMED: ICD-10-PCS | Mod: S$GLB,,, | Performed by: OPHTHALMOLOGY

## 2020-08-17 PROCEDURE — 99999 PR PBB SHADOW E&M-EST. PATIENT-LVL III: CPT | Mod: PBBFAC,,, | Performed by: OPHTHALMOLOGY

## 2020-08-17 NOTE — PROGRESS NOTES
Assessment /Plan     For exam results, see Encounter Report.    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

## 2020-08-17 NOTE — PROGRESS NOTES
Health Maintenance Due   Topic Date Due    TETANUS VACCINE  12/13/1968    Shingles Vaccine (1 of 2) 12/13/2001    DEXA SCAN  11/02/2018     Updates were requested from care everywhere.  Chart was reviewed for overdue Proactive [facility] Encounters (SHYAM) topics (CRS, Breast Cancer Screening, Eye exam)  Health Maintenance has been updated.  LINKS immunization registry triggered.  Immunizations were reconciled.

## 2020-08-18 RX ORDER — DORZOLAMIDE HYDROCHLORIDE AND TIMOLOL MALEATE 20; 5 MG/ML; MG/ML
1 SOLUTION/ DROPS OPHTHALMIC 2 TIMES DAILY
Qty: 30 ML | Refills: 4 | Status: SHIPPED | OUTPATIENT
Start: 2020-08-18 | End: 2021-11-18 | Stop reason: SDUPTHER

## 2020-08-18 RX ORDER — BRIMONIDINE TARTRATE 1 MG/ML
1 SOLUTION/ DROPS OPHTHALMIC 3 TIMES DAILY
Qty: 45 ML | Refills: 6 | Status: SHIPPED | OUTPATIENT
Start: 2020-08-18 | End: 2021-11-09

## 2020-08-18 RX ORDER — LATANOPROST 50 UG/ML
1 SOLUTION/ DROPS OPHTHALMIC NIGHTLY
Qty: 7.5 ML | Refills: 4 | Status: SHIPPED | OUTPATIENT
Start: 2020-08-18 | End: 2021-11-18 | Stop reason: SDUPTHER

## 2020-08-18 RX ORDER — FLUOROMETHOLONE 1 MG/ML
1 SUSPENSION/ DROPS OPHTHALMIC DAILY
Qty: 10 ML | Refills: 6 | Status: SHIPPED | OUTPATIENT
Start: 2020-08-18 | End: 2021-10-27

## 2020-12-06 ENCOUNTER — PATIENT OUTREACH (OUTPATIENT)
Dept: ADMINISTRATIVE | Facility: OTHER | Age: 70
End: 2020-12-06

## 2020-12-06 NOTE — PROGRESS NOTES
Requested updates within Care Everywhere.  Patient's chart was reviewed for overdue SHYAM topics.  Immunizations reconciled.

## 2020-12-08 ENCOUNTER — PATIENT MESSAGE (OUTPATIENT)
Dept: FAMILY MEDICINE | Facility: CLINIC | Age: 70
End: 2020-12-08

## 2020-12-08 ENCOUNTER — TELEPHONE (OUTPATIENT)
Dept: FAMILY MEDICINE | Facility: CLINIC | Age: 70
End: 2020-12-08

## 2020-12-08 DIAGNOSIS — Z20.822 EXPOSURE TO COVID-19 VIRUS: Primary | ICD-10-CM

## 2020-12-08 NOTE — TELEPHONE ENCOUNTER
Spoke with pt she states she will be flying to [place] and is required to have a negative COVID test. Pt is requesting provider place orders for testing.

## 2020-12-08 NOTE — TELEPHONE ENCOUNTER
----- Message from Pam Hairston sent at 12/8/2020 11:10 AM CST -----  Name of Who is Calling: MICHAEL SETH [[id number]]      What is the request in detail: Pt states she needs an order for a COVID19 placed in the system. Please contact to further discuss and advise.        Can the clinic reply by [facility]: N      [facility]  [phone]

## 2020-12-09 ENCOUNTER — OFFICE VISIT (OUTPATIENT)
Dept: OPHTHALMOLOGY | Facility: CLINIC | Age: 70
End: 2020-12-09
Payer: MEDICARE

## 2020-12-09 DIAGNOSIS — Z96.1 STATUS POST CATARACT EXTRACTION AND INSERTION OF INTRAOCULAR LENS OF RIGHT EYE: ICD-10-CM

## 2020-12-09 DIAGNOSIS — Z94.7 TRANSPLANTED CORNEA: ICD-10-CM

## 2020-12-09 DIAGNOSIS — Z98.41 STATUS POST CATARACT EXTRACTION AND INSERTION OF INTRAOCULAR LENS OF RIGHT EYE: ICD-10-CM

## 2020-12-09 DIAGNOSIS — Z97.0 EYE GLOBE PROSTHESIS: ICD-10-CM

## 2020-12-09 DIAGNOSIS — Q15.0 CONGENITAL GLAUCOMA OF BOTH EYES: ICD-10-CM

## 2020-12-09 DIAGNOSIS — H40.31X3 GLAUCOMA OF RIGHT EYE ASSOCIATED WITH OCULAR TRAUMA, SEVERE STAGE: Primary | ICD-10-CM

## 2020-12-09 PROCEDURE — 1126F PR PAIN SEVERITY QUANTIFIED, NO PAIN PRESENT: ICD-10-PCS | Mod: S$GLB,,, | Performed by: OPHTHALMOLOGY

## 2020-12-09 PROCEDURE — 99999 PR PBB SHADOW E&M-EST. PATIENT-LVL III: ICD-10-PCS | Mod: PBBFAC,,, | Performed by: OPHTHALMOLOGY

## 2020-12-09 PROCEDURE — 92012 PR EYE EXAM, EST PATIENT,INTERMED: ICD-10-PCS | Mod: S$GLB,,, | Performed by: OPHTHALMOLOGY

## 2020-12-09 PROCEDURE — 3288F PR FALLS RISK ASSESSMENT DOCUMENTED: ICD-10-PCS | Mod: CPTII,S$GLB,, | Performed by: OPHTHALMOLOGY

## 2020-12-09 PROCEDURE — 1101F PR PT FALLS ASSESS DOC 0-1 FALLS W/OUT INJ PAST YR: ICD-10-PCS | Mod: CPTII,S$GLB,, | Performed by: OPHTHALMOLOGY

## 2020-12-09 PROCEDURE — 3288F FALL RISK ASSESSMENT DOCD: CPT | Mod: CPTII,S$GLB,, | Performed by: OPHTHALMOLOGY

## 2020-12-09 PROCEDURE — 99999 PR PBB SHADOW E&M-EST. PATIENT-LVL III: CPT | Mod: PBBFAC,,, | Performed by: OPHTHALMOLOGY

## 2020-12-09 PROCEDURE — 92012 INTRM OPH EXAM EST PATIENT: CPT | Mod: S$GLB,,, | Performed by: OPHTHALMOLOGY

## 2020-12-09 PROCEDURE — 1101F PT FALLS ASSESS-DOCD LE1/YR: CPT | Mod: CPTII,S$GLB,, | Performed by: OPHTHALMOLOGY

## 2020-12-09 PROCEDURE — 1126F AMNT PAIN NOTED NONE PRSNT: CPT | Mod: S$GLB,,, | Performed by: OPHTHALMOLOGY

## 2020-12-09 RX ORDER — ESTRADIOL 0.1 MG/G
2 CREAM VAGINAL
COMMUNITY
Start: 2020-08-27 | End: 2021-04-19

## 2020-12-09 RX ORDER — ESTRADIOL 0.1 MG/G
CREAM VAGINAL
COMMUNITY
Start: 2020-11-19 | End: 2023-01-30

## 2020-12-09 RX ORDER — A/SINGAPORE/GP1908/2015 IVR-180 (AN A/MICHIGAN/45/2015 (H1N1)PDM09-LIKE VIRUS, A/HONG KONG/4801/2014, NYMC X-263B (H3N2) (AN A/HONG KONG/4801/2014-LIKE VIRUS), AND B/BRISBANE/60/2008, WILD TYPE (A B/BRISBANE/60/2008-LIKE VIRUS) 15; 15; 15 UG/.5ML; UG/.5ML; UG/.5ML
INJECTION, SUSPENSION INTRAMUSCULAR
COMMUNITY
Start: 2020-11-03 | End: 2021-07-26

## 2020-12-11 NOTE — PROGRESS NOTES
Assessment /Plan     For exam results, see Encounter Report.    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

## 2021-01-12 ENCOUNTER — PATIENT MESSAGE (OUTPATIENT)
Dept: FAMILY MEDICINE | Facility: CLINIC | Age: 71
End: 2021-01-12

## 2021-01-15 ENCOUNTER — LAB VISIT (OUTPATIENT)
Dept: LAB | Facility: HOSPITAL | Age: 71
End: 2021-01-15
Attending: INTERNAL MEDICINE
Payer: MEDICARE

## 2021-01-15 DIAGNOSIS — N18.30 CHRONIC KIDNEY DISEASE, STAGE 3: ICD-10-CM

## 2021-01-15 DIAGNOSIS — D56.9 THALASSEMIA, UNSPECIFIED TYPE: ICD-10-CM

## 2021-01-15 DIAGNOSIS — E78.00 PURE HYPERCHOLESTEROLEMIA: ICD-10-CM

## 2021-01-15 LAB
ALBUMIN SERPL BCP-MCNC: 4.2 G/DL (ref 3.5–5.2)
ALP SERPL-CCNC: 68 U/L (ref 55–135)
ALT SERPL W/O P-5'-P-CCNC: 27 U/L (ref 10–44)
ANION GAP SERPL CALC-SCNC: 8 MMOL/L (ref 8–16)
AST SERPL-CCNC: 24 U/L (ref 10–40)
BASOPHILS # BLD AUTO: 0.05 K/UL (ref 0–0.2)
BASOPHILS NFR BLD: 1.1 % (ref 0–1.9)
BILIRUB SERPL-MCNC: 0.7 MG/DL (ref 0.1–1)
BUN SERPL-MCNC: 22 MG/DL (ref 8–23)
CALCIUM SERPL-MCNC: 9.8 MG/DL (ref 8.7–10.5)
CHLORIDE SERPL-SCNC: 102 MMOL/L (ref 95–110)
CHOLEST SERPL-MCNC: 198 MG/DL (ref 120–199)
CHOLEST/HDLC SERPL: 2.4 {RATIO} (ref 2–5)
CO2 SERPL-SCNC: 30 MMOL/L (ref 23–29)
CREAT SERPL-MCNC: 1 MG/DL (ref 0.5–1.4)
DIFFERENTIAL METHOD: ABNORMAL
EOSINOPHIL # BLD AUTO: 0.2 K/UL (ref 0–0.5)
EOSINOPHIL NFR BLD: 3.7 % (ref 0–8)
ERYTHROCYTE [DISTWIDTH] IN BLOOD BY AUTOMATED COUNT: 15.9 % (ref 11.5–14.5)
EST. GFR  (AFRICAN AMERICAN): >60 ML/MIN/1.73 M^2
EST. GFR  (NON AFRICAN AMERICAN): 58 ML/MIN/1.73 M^2
GLUCOSE SERPL-MCNC: 97 MG/DL (ref 70–110)
HCT VFR BLD AUTO: 33.9 % (ref 37–48.5)
HDLC SERPL-MCNC: 84 MG/DL (ref 40–75)
HDLC SERPL: 42.4 % (ref 20–50)
HGB BLD-MCNC: 10.7 G/DL (ref 12–16)
IMM GRANULOCYTES # BLD AUTO: 0.01 K/UL (ref 0–0.04)
IMM GRANULOCYTES NFR BLD AUTO: 0.2 % (ref 0–0.5)
LDLC SERPL CALC-MCNC: 100.6 MG/DL (ref 63–159)
LYMPHOCYTES # BLD AUTO: 1.7 K/UL (ref 1–4.8)
LYMPHOCYTES NFR BLD: 39 % (ref 18–48)
MCH RBC QN AUTO: 21.3 PG (ref 27–31)
MCHC RBC AUTO-ENTMCNC: 31.6 G/DL (ref 32–36)
MCV RBC AUTO: 67 FL (ref 82–98)
MONOCYTES # BLD AUTO: 0.4 K/UL (ref 0.3–1)
MONOCYTES NFR BLD: 9.2 % (ref 4–15)
NEUTROPHILS # BLD AUTO: 2 K/UL (ref 1.8–7.7)
NEUTROPHILS NFR BLD: 46.8 % (ref 38–73)
NONHDLC SERPL-MCNC: 114 MG/DL
NRBC BLD-RTO: 0 /100 WBC
PLATELET # BLD AUTO: 209 K/UL (ref 150–350)
PMV BLD AUTO: 10 FL (ref 9.2–12.9)
POTASSIUM SERPL-SCNC: 4.5 MMOL/L (ref 3.5–5.1)
PROT SERPL-MCNC: 7.5 G/DL (ref 6–8.4)
RBC # BLD AUTO: 5.03 M/UL (ref 4–5.4)
SODIUM SERPL-SCNC: 140 MMOL/L (ref 136–145)
TRIGL SERPL-MCNC: 67 MG/DL (ref 30–150)
WBC # BLD AUTO: 4.36 K/UL (ref 3.9–12.7)

## 2021-01-15 PROCEDURE — 85025 COMPLETE CBC W/AUTO DIFF WBC: CPT

## 2021-01-15 PROCEDURE — 36415 COLL VENOUS BLD VENIPUNCTURE: CPT | Mod: PN

## 2021-01-15 PROCEDURE — 80061 LIPID PANEL: CPT

## 2021-01-15 PROCEDURE — 80053 COMPREHEN METABOLIC PANEL: CPT

## 2021-01-20 PROBLEM — N18.31 STAGE 3A CHRONIC KIDNEY DISEASE: Status: ACTIVE | Noted: 2019-12-03

## 2021-01-21 ENCOUNTER — OFFICE VISIT (OUTPATIENT)
Dept: FAMILY MEDICINE | Facility: CLINIC | Age: 71
End: 2021-01-21
Payer: MEDICARE

## 2021-01-21 VITALS
DIASTOLIC BLOOD PRESSURE: 66 MMHG | BODY MASS INDEX: 22.63 KG/M2 | HEART RATE: 63 BPM | HEIGHT: 62 IN | SYSTOLIC BLOOD PRESSURE: 138 MMHG | WEIGHT: 123 LBS | TEMPERATURE: 98 F | OXYGEN SATURATION: 99 %

## 2021-01-21 DIAGNOSIS — I10 ESSENTIAL HYPERTENSION: ICD-10-CM

## 2021-01-21 DIAGNOSIS — D56.9 THALASSEMIA, UNSPECIFIED TYPE: ICD-10-CM

## 2021-01-21 DIAGNOSIS — N18.31 STAGE 3A CHRONIC KIDNEY DISEASE: ICD-10-CM

## 2021-01-21 DIAGNOSIS — Z00.00 NORMAL PHYSICAL EXAM: Primary | ICD-10-CM

## 2021-01-21 DIAGNOSIS — E78.00 PURE HYPERCHOLESTEROLEMIA: ICD-10-CM

## 2021-01-21 PROCEDURE — 99499 UNLISTED E&M SERVICE: CPT | Mod: S$GLB,,, | Performed by: INTERNAL MEDICINE

## 2021-01-21 PROCEDURE — 99499 RISK ADDL DX/OHS AUDIT: ICD-10-PCS | Mod: S$GLB,,, | Performed by: INTERNAL MEDICINE

## 2021-01-21 PROCEDURE — 3078F DIAST BP <80 MM HG: CPT | Mod: CPTII,S$GLB,, | Performed by: INTERNAL MEDICINE

## 2021-01-21 PROCEDURE — 3288F PR FALLS RISK ASSESSMENT DOCUMENTED: ICD-10-PCS | Mod: CPTII,S$GLB,, | Performed by: INTERNAL MEDICINE

## 2021-01-21 PROCEDURE — 3078F PR MOST RECENT DIASTOLIC BLOOD PRESSURE < 80 MM HG: ICD-10-PCS | Mod: CPTII,S$GLB,, | Performed by: INTERNAL MEDICINE

## 2021-01-21 PROCEDURE — 3008F BODY MASS INDEX DOCD: CPT | Mod: CPTII,S$GLB,, | Performed by: INTERNAL MEDICINE

## 2021-01-21 PROCEDURE — 1101F PT FALLS ASSESS-DOCD LE1/YR: CPT | Mod: CPTII,S$GLB,, | Performed by: INTERNAL MEDICINE

## 2021-01-21 PROCEDURE — 3075F PR MOST RECENT SYSTOLIC BLOOD PRESS GE 130-139MM HG: ICD-10-PCS | Mod: CPTII,S$GLB,, | Performed by: INTERNAL MEDICINE

## 2021-01-21 PROCEDURE — 1159F PR MEDICATION LIST DOCUMENTED IN MEDICAL RECORD: ICD-10-PCS | Mod: S$GLB,,, | Performed by: INTERNAL MEDICINE

## 2021-01-21 PROCEDURE — 1159F MED LIST DOCD IN RCRD: CPT | Mod: S$GLB,,, | Performed by: INTERNAL MEDICINE

## 2021-01-21 PROCEDURE — 1101F PR PT FALLS ASSESS DOC 0-1 FALLS W/OUT INJ PAST YR: ICD-10-PCS | Mod: CPTII,S$GLB,, | Performed by: INTERNAL MEDICINE

## 2021-01-21 PROCEDURE — 3075F SYST BP GE 130 - 139MM HG: CPT | Mod: CPTII,S$GLB,, | Performed by: INTERNAL MEDICINE

## 2021-01-21 PROCEDURE — 1126F PR PAIN SEVERITY QUANTIFIED, NO PAIN PRESENT: ICD-10-PCS | Mod: S$GLB,,, | Performed by: INTERNAL MEDICINE

## 2021-01-21 PROCEDURE — 1126F AMNT PAIN NOTED NONE PRSNT: CPT | Mod: S$GLB,,, | Performed by: INTERNAL MEDICINE

## 2021-01-21 PROCEDURE — 3008F PR BODY MASS INDEX (BMI) DOCUMENTED: ICD-10-PCS | Mod: CPTII,S$GLB,, | Performed by: INTERNAL MEDICINE

## 2021-01-21 PROCEDURE — 99214 OFFICE O/P EST MOD 30 MIN: CPT | Mod: S$GLB,,, | Performed by: INTERNAL MEDICINE

## 2021-01-21 PROCEDURE — 3288F FALL RISK ASSESSMENT DOCD: CPT | Mod: CPTII,S$GLB,, | Performed by: INTERNAL MEDICINE

## 2021-01-21 PROCEDURE — 99999 PR PBB SHADOW E&M-EST. PATIENT-LVL IV: CPT | Mod: PBBFAC,,, | Performed by: INTERNAL MEDICINE

## 2021-01-21 PROCEDURE — 99214 PR OFFICE/OUTPT VISIT, EST, LEVL IV, 30-39 MIN: ICD-10-PCS | Mod: S$GLB,,, | Performed by: INTERNAL MEDICINE

## 2021-01-21 PROCEDURE — 99999 PR PBB SHADOW E&M-EST. PATIENT-LVL IV: ICD-10-PCS | Mod: PBBFAC,,, | Performed by: INTERNAL MEDICINE

## 2021-01-21 RX ORDER — ATENOLOL AND CHLORTHALIDONE TABLET 50; 25 MG/1; MG/1
1 TABLET ORAL EVERY MORNING
Qty: 90 TABLET | Refills: 3 | Status: SHIPPED | OUTPATIENT
Start: 2021-01-21 | End: 2022-01-31 | Stop reason: SDUPTHER

## 2021-01-21 RX ORDER — ATORVASTATIN CALCIUM 10 MG/1
10 TABLET, FILM COATED ORAL DAILY
Qty: 90 TABLET | Refills: 3 | Status: SHIPPED | OUTPATIENT
Start: 2021-01-21 | End: 2022-01-31 | Stop reason: SDUPTHER

## 2021-01-21 RX ORDER — POTASSIUM CHLORIDE 750 MG/1
10 TABLET, EXTENDED RELEASE ORAL DAILY
Qty: 90 TABLET | Refills: 3 | Status: SHIPPED | OUTPATIENT
Start: 2021-01-21 | End: 2022-01-31 | Stop reason: SDUPTHER

## 2021-04-15 ENCOUNTER — PATIENT MESSAGE (OUTPATIENT)
Dept: RESEARCH | Facility: HOSPITAL | Age: 71
End: 2021-04-15

## 2021-04-19 ENCOUNTER — OFFICE VISIT (OUTPATIENT)
Dept: OPHTHALMOLOGY | Facility: CLINIC | Age: 71
End: 2021-04-19
Payer: MEDICARE

## 2021-04-19 DIAGNOSIS — H40.31X3 GLAUCOMA OF RIGHT EYE ASSOCIATED WITH OCULAR TRAUMA, SEVERE STAGE: Primary | ICD-10-CM

## 2021-04-19 DIAGNOSIS — Z97.0 EYE GLOBE PROSTHESIS: ICD-10-CM

## 2021-04-19 DIAGNOSIS — Z96.1 STATUS POST CATARACT EXTRACTION AND INSERTION OF INTRAOCULAR LENS OF RIGHT EYE: ICD-10-CM

## 2021-04-19 DIAGNOSIS — Z94.7 TRANSPLANTED CORNEA: ICD-10-CM

## 2021-04-19 DIAGNOSIS — Z98.41 STATUS POST CATARACT EXTRACTION AND INSERTION OF INTRAOCULAR LENS OF RIGHT EYE: ICD-10-CM

## 2021-04-19 DIAGNOSIS — Q15.0 CONGENITAL GLAUCOMA OF BOTH EYES: ICD-10-CM

## 2021-04-19 PROCEDURE — 1126F PR PAIN SEVERITY QUANTIFIED, NO PAIN PRESENT: ICD-10-PCS | Mod: S$GLB,,, | Performed by: OPHTHALMOLOGY

## 2021-04-19 PROCEDURE — 1101F PT FALLS ASSESS-DOCD LE1/YR: CPT | Mod: CPTII,S$GLB,, | Performed by: OPHTHALMOLOGY

## 2021-04-19 PROCEDURE — 99999 PR PBB SHADOW E&M-EST. PATIENT-LVL III: CPT | Mod: PBBFAC,,, | Performed by: OPHTHALMOLOGY

## 2021-04-19 PROCEDURE — 3288F PR FALLS RISK ASSESSMENT DOCUMENTED: ICD-10-PCS | Mod: CPTII,S$GLB,, | Performed by: OPHTHALMOLOGY

## 2021-04-19 PROCEDURE — 99214 PR OFFICE/OUTPT VISIT, EST, LEVL IV, 30-39 MIN: ICD-10-PCS | Mod: S$GLB,,, | Performed by: OPHTHALMOLOGY

## 2021-04-19 PROCEDURE — 3288F FALL RISK ASSESSMENT DOCD: CPT | Mod: CPTII,S$GLB,, | Performed by: OPHTHALMOLOGY

## 2021-04-19 PROCEDURE — 1159F PR MEDICATION LIST DOCUMENTED IN MEDICAL RECORD: ICD-10-PCS | Mod: S$GLB,,, | Performed by: OPHTHALMOLOGY

## 2021-04-19 PROCEDURE — 1101F PR PT FALLS ASSESS DOC 0-1 FALLS W/OUT INJ PAST YR: ICD-10-PCS | Mod: CPTII,S$GLB,, | Performed by: OPHTHALMOLOGY

## 2021-04-19 PROCEDURE — 99999 PR PBB SHADOW E&M-EST. PATIENT-LVL III: ICD-10-PCS | Mod: PBBFAC,,, | Performed by: OPHTHALMOLOGY

## 2021-04-19 PROCEDURE — 1159F MED LIST DOCD IN RCRD: CPT | Mod: S$GLB,,, | Performed by: OPHTHALMOLOGY

## 2021-04-19 PROCEDURE — 1126F AMNT PAIN NOTED NONE PRSNT: CPT | Mod: S$GLB,,, | Performed by: OPHTHALMOLOGY

## 2021-04-19 PROCEDURE — 99214 OFFICE O/P EST MOD 30 MIN: CPT | Mod: S$GLB,,, | Performed by: OPHTHALMOLOGY

## 2021-07-19 ENCOUNTER — OFFICE VISIT (OUTPATIENT)
Dept: OPHTHALMOLOGY | Facility: CLINIC | Age: 71
End: 2021-07-19
Payer: MEDICARE

## 2021-07-19 DIAGNOSIS — Z98.41 STATUS POST CATARACT EXTRACTION AND INSERTION OF INTRAOCULAR LENS OF RIGHT EYE: ICD-10-CM

## 2021-07-19 DIAGNOSIS — Z96.1 STATUS POST CATARACT EXTRACTION AND INSERTION OF INTRAOCULAR LENS OF RIGHT EYE: ICD-10-CM

## 2021-07-19 DIAGNOSIS — Q15.0 CONGENITAL GLAUCOMA OF BOTH EYES: ICD-10-CM

## 2021-07-19 DIAGNOSIS — Z97.0 EYE GLOBE PROSTHESIS: ICD-10-CM

## 2021-07-19 DIAGNOSIS — Z94.7 TRANSPLANTED CORNEA: ICD-10-CM

## 2021-07-19 DIAGNOSIS — H40.31X3 GLAUCOMA OF RIGHT EYE ASSOCIATED WITH OCULAR TRAUMA, SEVERE STAGE: Primary | ICD-10-CM

## 2021-07-19 PROCEDURE — 99999 PR PBB SHADOW E&M-EST. PATIENT-LVL III: CPT | Mod: PBBFAC,,, | Performed by: OPHTHALMOLOGY

## 2021-07-19 PROCEDURE — 1159F MED LIST DOCD IN RCRD: CPT | Mod: CPTII,S$GLB,, | Performed by: OPHTHALMOLOGY

## 2021-07-19 PROCEDURE — 99214 OFFICE O/P EST MOD 30 MIN: CPT | Mod: S$GLB,,, | Performed by: OPHTHALMOLOGY

## 2021-07-19 PROCEDURE — 3288F FALL RISK ASSESSMENT DOCD: CPT | Mod: CPTII,S$GLB,, | Performed by: OPHTHALMOLOGY

## 2021-07-19 PROCEDURE — 1159F PR MEDICATION LIST DOCUMENTED IN MEDICAL RECORD: ICD-10-PCS | Mod: CPTII,S$GLB,, | Performed by: OPHTHALMOLOGY

## 2021-07-19 PROCEDURE — 1101F PR PT FALLS ASSESS DOC 0-1 FALLS W/OUT INJ PAST YR: ICD-10-PCS | Mod: CPTII,S$GLB,, | Performed by: OPHTHALMOLOGY

## 2021-07-19 PROCEDURE — 3288F PR FALLS RISK ASSESSMENT DOCUMENTED: ICD-10-PCS | Mod: CPTII,S$GLB,, | Performed by: OPHTHALMOLOGY

## 2021-07-19 PROCEDURE — 99999 PR PBB SHADOW E&M-EST. PATIENT-LVL III: ICD-10-PCS | Mod: PBBFAC,,, | Performed by: OPHTHALMOLOGY

## 2021-07-19 PROCEDURE — 99214 PR OFFICE/OUTPT VISIT, EST, LEVL IV, 30-39 MIN: ICD-10-PCS | Mod: S$GLB,,, | Performed by: OPHTHALMOLOGY

## 2021-07-19 PROCEDURE — 1101F PT FALLS ASSESS-DOCD LE1/YR: CPT | Mod: CPTII,S$GLB,, | Performed by: OPHTHALMOLOGY

## 2021-07-20 ENCOUNTER — PATIENT MESSAGE (OUTPATIENT)
Dept: FAMILY MEDICINE | Facility: CLINIC | Age: 71
End: 2021-07-20

## 2021-07-20 DIAGNOSIS — E78.00 PURE HYPERCHOLESTEROLEMIA: ICD-10-CM

## 2021-07-20 DIAGNOSIS — I10 ESSENTIAL HYPERTENSION: ICD-10-CM

## 2021-07-20 DIAGNOSIS — N18.31 STAGE 3A CHRONIC KIDNEY DISEASE: Primary | ICD-10-CM

## 2021-07-23 ENCOUNTER — LAB VISIT (OUTPATIENT)
Dept: LAB | Facility: HOSPITAL | Age: 71
End: 2021-07-23
Attending: INTERNAL MEDICINE
Payer: MEDICARE

## 2021-07-23 DIAGNOSIS — N18.31 STAGE 3A CHRONIC KIDNEY DISEASE: ICD-10-CM

## 2021-07-23 DIAGNOSIS — E78.00 PURE HYPERCHOLESTEROLEMIA: ICD-10-CM

## 2021-07-23 LAB
25(OH)D3+25(OH)D2 SERPL-MCNC: 40 NG/ML (ref 30–96)
ALBUMIN SERPL BCP-MCNC: 4.1 G/DL (ref 3.5–5.2)
ALP SERPL-CCNC: 75 U/L (ref 55–135)
ALT SERPL W/O P-5'-P-CCNC: 37 U/L (ref 10–44)
ANION GAP SERPL CALC-SCNC: 9 MMOL/L (ref 8–16)
AST SERPL-CCNC: 34 U/L (ref 10–40)
BASOPHILS # BLD AUTO: 0.06 K/UL (ref 0–0.2)
BASOPHILS NFR BLD: 1.2 % (ref 0–1.9)
BILIRUB SERPL-MCNC: 0.5 MG/DL (ref 0.1–1)
BUN SERPL-MCNC: 30 MG/DL (ref 8–23)
CALCIUM SERPL-MCNC: 10.5 MG/DL (ref 8.7–10.5)
CHLORIDE SERPL-SCNC: 103 MMOL/L (ref 95–110)
CHOLEST SERPL-MCNC: 193 MG/DL (ref 120–199)
CHOLEST/HDLC SERPL: 2.1 {RATIO} (ref 2–5)
CO2 SERPL-SCNC: 30 MMOL/L (ref 23–29)
CREAT SERPL-MCNC: 1.1 MG/DL (ref 0.5–1.4)
DIFFERENTIAL METHOD: ABNORMAL
EOSINOPHIL # BLD AUTO: 0.3 K/UL (ref 0–0.5)
EOSINOPHIL NFR BLD: 5.1 % (ref 0–8)
ERYTHROCYTE [DISTWIDTH] IN BLOOD BY AUTOMATED COUNT: 16.2 % (ref 11.5–14.5)
EST. GFR  (AFRICAN AMERICAN): 59.2 ML/MIN/1.73 M^2
EST. GFR  (NON AFRICAN AMERICAN): 51.3 ML/MIN/1.73 M^2
GLUCOSE SERPL-MCNC: 103 MG/DL (ref 70–110)
HCT VFR BLD AUTO: 36.5 % (ref 37–48.5)
HDLC SERPL-MCNC: 90 MG/DL (ref 40–75)
HDLC SERPL: 46.6 % (ref 20–50)
HGB BLD-MCNC: 11 G/DL (ref 12–16)
IMM GRANULOCYTES # BLD AUTO: 0.02 K/UL (ref 0–0.04)
IMM GRANULOCYTES NFR BLD AUTO: 0.4 % (ref 0–0.5)
LDLC SERPL CALC-MCNC: 89.4 MG/DL (ref 63–159)
LYMPHOCYTES # BLD AUTO: 2 K/UL (ref 1–4.8)
LYMPHOCYTES NFR BLD: 41.5 % (ref 18–48)
MCH RBC QN AUTO: 21.5 PG (ref 27–31)
MCHC RBC AUTO-ENTMCNC: 30.1 G/DL (ref 32–36)
MCV RBC AUTO: 71 FL (ref 82–98)
MONOCYTES # BLD AUTO: 0.4 K/UL (ref 0.3–1)
MONOCYTES NFR BLD: 8 % (ref 4–15)
NEUTROPHILS # BLD AUTO: 2.1 K/UL (ref 1.8–7.7)
NEUTROPHILS NFR BLD: 43.8 % (ref 38–73)
NONHDLC SERPL-MCNC: 103 MG/DL
NRBC BLD-RTO: 0 /100 WBC
PHOSPHATE SERPL-MCNC: 3.9 MG/DL (ref 2.7–4.5)
PLATELET # BLD AUTO: 196 K/UL (ref 150–450)
PMV BLD AUTO: 9.9 FL (ref 9.2–12.9)
POTASSIUM SERPL-SCNC: 4.5 MMOL/L (ref 3.5–5.1)
PROT SERPL-MCNC: 7.6 G/DL (ref 6–8.4)
PTH-INTACT SERPL-MCNC: 76 PG/ML (ref 9–77)
RBC # BLD AUTO: 5.12 M/UL (ref 4–5.4)
SODIUM SERPL-SCNC: 142 MMOL/L (ref 136–145)
TRIGL SERPL-MCNC: 68 MG/DL (ref 30–150)
WBC # BLD AUTO: 4.89 K/UL (ref 3.9–12.7)

## 2021-07-23 PROCEDURE — 80061 LIPID PANEL: CPT | Performed by: INTERNAL MEDICINE

## 2021-07-23 PROCEDURE — 82306 VITAMIN D 25 HYDROXY: CPT | Performed by: INTERNAL MEDICINE

## 2021-07-23 PROCEDURE — 80053 COMPREHEN METABOLIC PANEL: CPT | Performed by: INTERNAL MEDICINE

## 2021-07-23 PROCEDURE — 36415 COLL VENOUS BLD VENIPUNCTURE: CPT | Mod: PO | Performed by: INTERNAL MEDICINE

## 2021-07-23 PROCEDURE — 84100 ASSAY OF PHOSPHORUS: CPT | Performed by: INTERNAL MEDICINE

## 2021-07-23 PROCEDURE — 83970 ASSAY OF PARATHORMONE: CPT | Performed by: INTERNAL MEDICINE

## 2021-07-23 PROCEDURE — 85025 COMPLETE CBC W/AUTO DIFF WBC: CPT | Performed by: INTERNAL MEDICINE

## 2021-07-26 ENCOUNTER — OFFICE VISIT (OUTPATIENT)
Dept: FAMILY MEDICINE | Facility: CLINIC | Age: 71
End: 2021-07-26
Payer: MEDICARE

## 2021-07-26 VITALS
OXYGEN SATURATION: 97 % | HEIGHT: 62 IN | SYSTOLIC BLOOD PRESSURE: 132 MMHG | TEMPERATURE: 98 F | BODY MASS INDEX: 23.77 KG/M2 | WEIGHT: 129.19 LBS | DIASTOLIC BLOOD PRESSURE: 72 MMHG | HEART RATE: 68 BPM

## 2021-07-26 DIAGNOSIS — D56.9 THALASSEMIA, UNSPECIFIED TYPE: ICD-10-CM

## 2021-07-26 DIAGNOSIS — E78.00 PURE HYPERCHOLESTEROLEMIA: ICD-10-CM

## 2021-07-26 DIAGNOSIS — N18.31 STAGE 3A CHRONIC KIDNEY DISEASE: ICD-10-CM

## 2021-07-26 DIAGNOSIS — Z23 NEED FOR SHINGLES VACCINE: ICD-10-CM

## 2021-07-26 DIAGNOSIS — I10 ESSENTIAL HYPERTENSION: Primary | ICD-10-CM

## 2021-07-26 PROCEDURE — 90750 HZV VACC RECOMBINANT IM: CPT | Mod: S$GLB,,, | Performed by: INTERNAL MEDICINE

## 2021-07-26 PROCEDURE — 99214 OFFICE O/P EST MOD 30 MIN: CPT | Mod: 25,S$GLB,, | Performed by: INTERNAL MEDICINE

## 2021-07-26 PROCEDURE — 90471 ZOSTER RECOMBINANT VACCINE: ICD-10-PCS | Mod: S$GLB,,, | Performed by: INTERNAL MEDICINE

## 2021-07-26 PROCEDURE — 90750 ZOSTER RECOMBINANT VACCINE: ICD-10-PCS | Mod: S$GLB,,, | Performed by: INTERNAL MEDICINE

## 2021-07-26 PROCEDURE — 3288F FALL RISK ASSESSMENT DOCD: CPT | Mod: CPTII,S$GLB,, | Performed by: INTERNAL MEDICINE

## 2021-07-26 PROCEDURE — 3008F BODY MASS INDEX DOCD: CPT | Mod: CPTII,S$GLB,, | Performed by: INTERNAL MEDICINE

## 2021-07-26 PROCEDURE — 3078F PR MOST RECENT DIASTOLIC BLOOD PRESSURE < 80 MM HG: ICD-10-PCS | Mod: CPTII,S$GLB,, | Performed by: INTERNAL MEDICINE

## 2021-07-26 PROCEDURE — 99999 PR PBB SHADOW E&M-EST. PATIENT-LVL IV: ICD-10-PCS | Mod: PBBFAC,,, | Performed by: INTERNAL MEDICINE

## 2021-07-26 PROCEDURE — 1160F RVW MEDS BY RX/DR IN RCRD: CPT | Mod: CPTII,S$GLB,, | Performed by: INTERNAL MEDICINE

## 2021-07-26 PROCEDURE — 1159F MED LIST DOCD IN RCRD: CPT | Mod: CPTII,S$GLB,, | Performed by: INTERNAL MEDICINE

## 2021-07-26 PROCEDURE — 1160F PR REVIEW ALL MEDS BY PRESCRIBER/CLIN PHARMACIST DOCUMENTED: ICD-10-PCS | Mod: CPTII,S$GLB,, | Performed by: INTERNAL MEDICINE

## 2021-07-26 PROCEDURE — 3078F DIAST BP <80 MM HG: CPT | Mod: CPTII,S$GLB,, | Performed by: INTERNAL MEDICINE

## 2021-07-26 PROCEDURE — 1101F PR PT FALLS ASSESS DOC 0-1 FALLS W/OUT INJ PAST YR: ICD-10-PCS | Mod: CPTII,S$GLB,, | Performed by: INTERNAL MEDICINE

## 2021-07-26 PROCEDURE — 3075F PR MOST RECENT SYSTOLIC BLOOD PRESS GE 130-139MM HG: ICD-10-PCS | Mod: CPTII,S$GLB,, | Performed by: INTERNAL MEDICINE

## 2021-07-26 PROCEDURE — 99999 PR PBB SHADOW E&M-EST. PATIENT-LVL IV: CPT | Mod: PBBFAC,,, | Performed by: INTERNAL MEDICINE

## 2021-07-26 PROCEDURE — 1101F PT FALLS ASSESS-DOCD LE1/YR: CPT | Mod: CPTII,S$GLB,, | Performed by: INTERNAL MEDICINE

## 2021-07-26 PROCEDURE — 3288F PR FALLS RISK ASSESSMENT DOCUMENTED: ICD-10-PCS | Mod: CPTII,S$GLB,, | Performed by: INTERNAL MEDICINE

## 2021-07-26 PROCEDURE — 99214 PR OFFICE/OUTPT VISIT, EST, LEVL IV, 30-39 MIN: ICD-10-PCS | Mod: 25,S$GLB,, | Performed by: INTERNAL MEDICINE

## 2021-07-26 PROCEDURE — 1126F AMNT PAIN NOTED NONE PRSNT: CPT | Mod: CPTII,S$GLB,, | Performed by: INTERNAL MEDICINE

## 2021-07-26 PROCEDURE — 1159F PR MEDICATION LIST DOCUMENTED IN MEDICAL RECORD: ICD-10-PCS | Mod: CPTII,S$GLB,, | Performed by: INTERNAL MEDICINE

## 2021-07-26 PROCEDURE — 3075F SYST BP GE 130 - 139MM HG: CPT | Mod: CPTII,S$GLB,, | Performed by: INTERNAL MEDICINE

## 2021-07-26 PROCEDURE — 3008F PR BODY MASS INDEX (BMI) DOCUMENTED: ICD-10-PCS | Mod: CPTII,S$GLB,, | Performed by: INTERNAL MEDICINE

## 2021-07-26 PROCEDURE — 1126F PR PAIN SEVERITY QUANTIFIED, NO PAIN PRESENT: ICD-10-PCS | Mod: CPTII,S$GLB,, | Performed by: INTERNAL MEDICINE

## 2021-07-26 PROCEDURE — 90471 IMMUNIZATION ADMIN: CPT | Mod: S$GLB,,, | Performed by: INTERNAL MEDICINE

## 2021-10-15 ENCOUNTER — PES CALL (OUTPATIENT)
Dept: ADMINISTRATIVE | Facility: CLINIC | Age: 71
End: 2021-10-15

## 2021-11-15 ENCOUNTER — OFFICE VISIT (OUTPATIENT)
Dept: OPHTHALMOLOGY | Facility: CLINIC | Age: 71
End: 2021-11-15
Payer: MEDICARE

## 2021-11-15 DIAGNOSIS — Z97.0 EYE GLOBE PROSTHESIS: ICD-10-CM

## 2021-11-15 DIAGNOSIS — Z96.1 STATUS POST CATARACT EXTRACTION AND INSERTION OF INTRAOCULAR LENS OF RIGHT EYE: ICD-10-CM

## 2021-11-15 DIAGNOSIS — Z94.7 TRANSPLANTED CORNEA: ICD-10-CM

## 2021-11-15 DIAGNOSIS — Z98.41 STATUS POST CATARACT EXTRACTION AND INSERTION OF INTRAOCULAR LENS OF RIGHT EYE: ICD-10-CM

## 2021-11-15 DIAGNOSIS — H40.31X3 GLAUCOMA OF RIGHT EYE ASSOCIATED WITH OCULAR TRAUMA, SEVERE STAGE: Primary | ICD-10-CM

## 2021-11-15 DIAGNOSIS — Q15.0 CONGENITAL GLAUCOMA OF BOTH EYES: ICD-10-CM

## 2021-11-15 PROCEDURE — 1159F PR MEDICATION LIST DOCUMENTED IN MEDICAL RECORD: ICD-10-PCS | Mod: CPTII,S$GLB,, | Performed by: OPHTHALMOLOGY

## 2021-11-15 PROCEDURE — 1101F PR PT FALLS ASSESS DOC 0-1 FALLS W/OUT INJ PAST YR: ICD-10-PCS | Mod: CPTII,S$GLB,, | Performed by: OPHTHALMOLOGY

## 2021-11-15 PROCEDURE — 1126F AMNT PAIN NOTED NONE PRSNT: CPT | Mod: CPTII,S$GLB,, | Performed by: OPHTHALMOLOGY

## 2021-11-15 PROCEDURE — 1159F MED LIST DOCD IN RCRD: CPT | Mod: CPTII,S$GLB,, | Performed by: OPHTHALMOLOGY

## 2021-11-15 PROCEDURE — 99999 PR PBB SHADOW E&M-EST. PATIENT-LVL III: CPT | Mod: PBBFAC,,, | Performed by: OPHTHALMOLOGY

## 2021-11-15 PROCEDURE — 1160F PR REVIEW ALL MEDS BY PRESCRIBER/CLIN PHARMACIST DOCUMENTED: ICD-10-PCS | Mod: CPTII,S$GLB,, | Performed by: OPHTHALMOLOGY

## 2021-11-15 PROCEDURE — 1160F RVW MEDS BY RX/DR IN RCRD: CPT | Mod: CPTII,S$GLB,, | Performed by: OPHTHALMOLOGY

## 2021-11-15 PROCEDURE — 3288F FALL RISK ASSESSMENT DOCD: CPT | Mod: CPTII,S$GLB,, | Performed by: OPHTHALMOLOGY

## 2021-11-15 PROCEDURE — 99214 PR OFFICE/OUTPT VISIT, EST, LEVL IV, 30-39 MIN: ICD-10-PCS | Mod: S$GLB,,, | Performed by: OPHTHALMOLOGY

## 2021-11-15 PROCEDURE — 1101F PT FALLS ASSESS-DOCD LE1/YR: CPT | Mod: CPTII,S$GLB,, | Performed by: OPHTHALMOLOGY

## 2021-11-15 PROCEDURE — 1126F PR PAIN SEVERITY QUANTIFIED, NO PAIN PRESENT: ICD-10-PCS | Mod: CPTII,S$GLB,, | Performed by: OPHTHALMOLOGY

## 2021-11-15 PROCEDURE — 99214 OFFICE O/P EST MOD 30 MIN: CPT | Mod: S$GLB,,, | Performed by: OPHTHALMOLOGY

## 2021-11-15 PROCEDURE — 3288F PR FALLS RISK ASSESSMENT DOCUMENTED: ICD-10-PCS | Mod: CPTII,S$GLB,, | Performed by: OPHTHALMOLOGY

## 2021-11-15 PROCEDURE — 99999 PR PBB SHADOW E&M-EST. PATIENT-LVL III: ICD-10-PCS | Mod: PBBFAC,,, | Performed by: OPHTHALMOLOGY

## 2021-11-16 ENCOUNTER — PATIENT MESSAGE (OUTPATIENT)
Dept: OPHTHALMOLOGY | Facility: CLINIC | Age: 71
End: 2021-11-16
Payer: MEDICARE

## 2021-11-18 ENCOUNTER — PATIENT MESSAGE (OUTPATIENT)
Dept: OPHTHALMOLOGY | Facility: CLINIC | Age: 71
End: 2021-11-18
Payer: MEDICARE

## 2021-11-18 DIAGNOSIS — H40.1113 PRIMARY OPEN ANGLE GLAUCOMA (POAG) OF RIGHT EYE, SEVERE STAGE: ICD-10-CM

## 2021-11-18 RX ORDER — LATANOPROST 50 UG/ML
1 SOLUTION/ DROPS OPHTHALMIC NIGHTLY
Qty: 7.5 ML | Refills: 4 | Status: SHIPPED | OUTPATIENT
Start: 2021-11-18 | End: 2022-12-12

## 2021-11-18 RX ORDER — DORZOLAMIDE HYDROCHLORIDE AND TIMOLOL MALEATE 20; 5 MG/ML; MG/ML
1 SOLUTION/ DROPS OPHTHALMIC 3 TIMES DAILY
Qty: 30 ML | Refills: 4 | Status: SHIPPED | OUTPATIENT
Start: 2021-11-18 | End: 2022-12-12

## 2022-01-10 ENCOUNTER — OFFICE VISIT (OUTPATIENT)
Dept: OPHTHALMOLOGY | Facility: CLINIC | Age: 72
End: 2022-01-10
Payer: MEDICARE

## 2022-01-10 DIAGNOSIS — Z96.1 STATUS POST CATARACT EXTRACTION AND INSERTION OF INTRAOCULAR LENS OF RIGHT EYE: ICD-10-CM

## 2022-01-10 DIAGNOSIS — Z98.41 STATUS POST CATARACT EXTRACTION AND INSERTION OF INTRAOCULAR LENS OF RIGHT EYE: ICD-10-CM

## 2022-01-10 DIAGNOSIS — Z97.0 EYE GLOBE PROSTHESIS: ICD-10-CM

## 2022-01-10 DIAGNOSIS — Q15.0 CONGENITAL GLAUCOMA OF BOTH EYES: ICD-10-CM

## 2022-01-10 DIAGNOSIS — H40.31X3 GLAUCOMA OF RIGHT EYE ASSOCIATED WITH OCULAR TRAUMA, SEVERE STAGE: Primary | ICD-10-CM

## 2022-01-10 DIAGNOSIS — Z94.7 TRANSPLANTED CORNEA: ICD-10-CM

## 2022-01-10 PROCEDURE — 1126F PR PAIN SEVERITY QUANTIFIED, NO PAIN PRESENT: ICD-10-PCS | Mod: CPTII,S$GLB,, | Performed by: OPHTHALMOLOGY

## 2022-01-10 PROCEDURE — 1160F RVW MEDS BY RX/DR IN RCRD: CPT | Mod: CPTII,S$GLB,, | Performed by: OPHTHALMOLOGY

## 2022-01-10 PROCEDURE — 1159F PR MEDICATION LIST DOCUMENTED IN MEDICAL RECORD: ICD-10-PCS | Mod: CPTII,S$GLB,, | Performed by: OPHTHALMOLOGY

## 2022-01-10 PROCEDURE — 99214 OFFICE O/P EST MOD 30 MIN: CPT | Mod: S$GLB,,, | Performed by: OPHTHALMOLOGY

## 2022-01-10 PROCEDURE — 99999 PR PBB SHADOW E&M-EST. PATIENT-LVL III: CPT | Mod: PBBFAC,,, | Performed by: OPHTHALMOLOGY

## 2022-01-10 PROCEDURE — 1101F PR PT FALLS ASSESS DOC 0-1 FALLS W/OUT INJ PAST YR: ICD-10-PCS | Mod: CPTII,S$GLB,, | Performed by: OPHTHALMOLOGY

## 2022-01-10 PROCEDURE — 1160F PR REVIEW ALL MEDS BY PRESCRIBER/CLIN PHARMACIST DOCUMENTED: ICD-10-PCS | Mod: CPTII,S$GLB,, | Performed by: OPHTHALMOLOGY

## 2022-01-10 PROCEDURE — 1159F MED LIST DOCD IN RCRD: CPT | Mod: CPTII,S$GLB,, | Performed by: OPHTHALMOLOGY

## 2022-01-10 PROCEDURE — 99214 PR OFFICE/OUTPT VISIT, EST, LEVL IV, 30-39 MIN: ICD-10-PCS | Mod: S$GLB,,, | Performed by: OPHTHALMOLOGY

## 2022-01-10 PROCEDURE — 3288F FALL RISK ASSESSMENT DOCD: CPT | Mod: CPTII,S$GLB,, | Performed by: OPHTHALMOLOGY

## 2022-01-10 PROCEDURE — 1101F PT FALLS ASSESS-DOCD LE1/YR: CPT | Mod: CPTII,S$GLB,, | Performed by: OPHTHALMOLOGY

## 2022-01-10 PROCEDURE — 99999 PR PBB SHADOW E&M-EST. PATIENT-LVL III: ICD-10-PCS | Mod: PBBFAC,,, | Performed by: OPHTHALMOLOGY

## 2022-01-10 PROCEDURE — 3288F PR FALLS RISK ASSESSMENT DOCUMENTED: ICD-10-PCS | Mod: CPTII,S$GLB,, | Performed by: OPHTHALMOLOGY

## 2022-01-10 PROCEDURE — 1126F AMNT PAIN NOTED NONE PRSNT: CPT | Mod: CPTII,S$GLB,, | Performed by: OPHTHALMOLOGY

## 2022-01-10 PROCEDURE — 92133 POSTERIOR SEGMENT OCT OPTIC NERVE(OCULAR COHERENCE TOMOGRAPHY) - OU - BOTH EYES: ICD-10-PCS | Mod: S$GLB,,, | Performed by: OPHTHALMOLOGY

## 2022-01-10 PROCEDURE — 92133 CPTRZD OPH DX IMG PST SGM ON: CPT | Mod: S$GLB,,, | Performed by: OPHTHALMOLOGY

## 2022-01-10 NOTE — PROGRESS NOTES
Assessment /Plan     For exam results, see Encounter Report.    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    Traumatic Glaucoma OD  Congenital Glaucoma  Prosthesis OS    CCT  530    Mid-low teens --> less than ideal & reviewed with patient --> discussed options      Right Eye --> good adherence --> CSM  Cosopt TID  Alphagan TID  Xal q day  FML 5 days / week     Giovani TID --> holding  Beni q day --> not covered    Diamox --> Intol Hypo K --> now on K suppl -->  D 250 BID --> Holding  Tolerated well in past    Right eye MP3 / G6 Laser 2019 --> Re-consider Repeat as re-discussed  Pre-Tx @ 27, 19, 24      PC IOL OD  Quiet  Observe    Monocular precautions    Prosthesis  Conj bed quiet 2019  fu with       Plan  RTC 3 months IOP & Adherence  RTC sooner prn with good understanding

## 2022-01-11 ENCOUNTER — PATIENT MESSAGE (OUTPATIENT)
Dept: OPHTHALMOLOGY | Facility: CLINIC | Age: 72
End: 2022-01-11
Payer: MEDICARE

## 2022-01-24 DIAGNOSIS — I10 ESSENTIAL HYPERTENSION: ICD-10-CM

## 2022-01-24 NOTE — TELEPHONE ENCOUNTER
No new care gaps identified.  Powered by [employer]. Reference number: [id number].   1/24/2022 9:48:39 AM CST

## 2022-01-26 ENCOUNTER — LAB VISIT (OUTPATIENT)
Dept: LAB | Facility: HOSPITAL | Age: 72
End: 2022-01-26
Attending: INTERNAL MEDICINE
Payer: MEDICARE

## 2022-01-26 DIAGNOSIS — D56.9 THALASSEMIA, UNSPECIFIED TYPE: ICD-10-CM

## 2022-01-26 DIAGNOSIS — E78.00 PURE HYPERCHOLESTEROLEMIA: ICD-10-CM

## 2022-01-26 DIAGNOSIS — N18.31 STAGE 3A CHRONIC KIDNEY DISEASE: ICD-10-CM

## 2022-01-26 LAB
ALBUMIN SERPL BCP-MCNC: 4 G/DL (ref 3.5–5.2)
ALP SERPL-CCNC: 84 U/L (ref 55–135)
ALT SERPL W/O P-5'-P-CCNC: 24 U/L (ref 10–44)
ANION GAP SERPL CALC-SCNC: 8 MMOL/L (ref 8–16)
AST SERPL-CCNC: 25 U/L (ref 10–40)
BASOPHILS # BLD AUTO: 0.05 K/UL (ref 0–0.2)
BASOPHILS # BLD AUTO: 0.05 K/UL (ref 0–0.2)
BASOPHILS NFR BLD: 1.1 % (ref 0–1.9)
BASOPHILS NFR BLD: 1.1 % (ref 0–1.9)
BILIRUB SERPL-MCNC: 0.7 MG/DL (ref 0.1–1)
BUN SERPL-MCNC: 18 MG/DL (ref 8–23)
CALCIUM SERPL-MCNC: 10.3 MG/DL (ref 8.7–10.5)
CHLORIDE SERPL-SCNC: 105 MMOL/L (ref 95–110)
CHOLEST SERPL-MCNC: 200 MG/DL (ref 120–199)
CHOLEST/HDLC SERPL: 2.2 {RATIO} (ref 2–5)
CO2 SERPL-SCNC: 30 MMOL/L (ref 23–29)
CREAT SERPL-MCNC: 1 MG/DL (ref 0.5–1.4)
DIFFERENTIAL METHOD: ABNORMAL
DIFFERENTIAL METHOD: ABNORMAL
EOSINOPHIL # BLD AUTO: 0.2 K/UL (ref 0–0.5)
EOSINOPHIL # BLD AUTO: 0.2 K/UL (ref 0–0.5)
EOSINOPHIL NFR BLD: 5 % (ref 0–8)
EOSINOPHIL NFR BLD: 5 % (ref 0–8)
ERYTHROCYTE [DISTWIDTH] IN BLOOD BY AUTOMATED COUNT: 15.6 % (ref 11.5–14.5)
ERYTHROCYTE [DISTWIDTH] IN BLOOD BY AUTOMATED COUNT: 15.6 % (ref 11.5–14.5)
EST. GFR  (AFRICAN AMERICAN): >60 ML/MIN/1.73 M^2
EST. GFR  (NON AFRICAN AMERICAN): 57.2 ML/MIN/1.73 M^2
GLUCOSE SERPL-MCNC: 95 MG/DL (ref 70–110)
HCT VFR BLD AUTO: 36 % (ref 37–48.5)
HCT VFR BLD AUTO: 36 % (ref 37–48.5)
HDLC SERPL-MCNC: 89 MG/DL (ref 40–75)
HDLC SERPL: 44.5 % (ref 20–50)
HGB BLD-MCNC: 10.6 G/DL (ref 12–16)
HGB BLD-MCNC: 10.6 G/DL (ref 12–16)
IMM GRANULOCYTES # BLD AUTO: 0.02 K/UL (ref 0–0.04)
IMM GRANULOCYTES # BLD AUTO: 0.02 K/UL (ref 0–0.04)
IMM GRANULOCYTES NFR BLD AUTO: 0.5 % (ref 0–0.5)
IMM GRANULOCYTES NFR BLD AUTO: 0.5 % (ref 0–0.5)
LDLC SERPL CALC-MCNC: 97.2 MG/DL (ref 63–159)
LYMPHOCYTES # BLD AUTO: 1.8 K/UL (ref 1–4.8)
LYMPHOCYTES # BLD AUTO: 1.8 K/UL (ref 1–4.8)
LYMPHOCYTES NFR BLD: 41.2 % (ref 18–48)
LYMPHOCYTES NFR BLD: 41.2 % (ref 18–48)
MCH RBC QN AUTO: 21 PG (ref 27–31)
MCH RBC QN AUTO: 21 PG (ref 27–31)
MCHC RBC AUTO-ENTMCNC: 29.4 G/DL (ref 32–36)
MCHC RBC AUTO-ENTMCNC: 29.4 G/DL (ref 32–36)
MCV RBC AUTO: 71 FL (ref 82–98)
MCV RBC AUTO: 71 FL (ref 82–98)
MONOCYTES # BLD AUTO: 0.4 K/UL (ref 0.3–1)
MONOCYTES # BLD AUTO: 0.4 K/UL (ref 0.3–1)
MONOCYTES NFR BLD: 9 % (ref 4–15)
MONOCYTES NFR BLD: 9 % (ref 4–15)
NEUTROPHILS # BLD AUTO: 1.9 K/UL (ref 1.8–7.7)
NEUTROPHILS # BLD AUTO: 1.9 K/UL (ref 1.8–7.7)
NEUTROPHILS NFR BLD: 43.2 % (ref 38–73)
NEUTROPHILS NFR BLD: 43.2 % (ref 38–73)
NONHDLC SERPL-MCNC: 111 MG/DL
NRBC BLD-RTO: 0 /100 WBC
NRBC BLD-RTO: 0 /100 WBC
PLATELET # BLD AUTO: 206 K/UL (ref 150–450)
PLATELET # BLD AUTO: 206 K/UL (ref 150–450)
PMV BLD AUTO: 10 FL (ref 9.2–12.9)
PMV BLD AUTO: 10 FL (ref 9.2–12.9)
POTASSIUM SERPL-SCNC: 4.1 MMOL/L (ref 3.5–5.1)
PROT SERPL-MCNC: 7.3 G/DL (ref 6–8.4)
RBC # BLD AUTO: 5.05 M/UL (ref 4–5.4)
RBC # BLD AUTO: 5.05 M/UL (ref 4–5.4)
SODIUM SERPL-SCNC: 143 MMOL/L (ref 136–145)
TRIGL SERPL-MCNC: 69 MG/DL (ref 30–150)
WBC # BLD AUTO: 4.44 K/UL (ref 3.9–12.7)
WBC # BLD AUTO: 4.44 K/UL (ref 3.9–12.7)

## 2022-01-26 PROCEDURE — 80061 LIPID PANEL: CPT | Performed by: INTERNAL MEDICINE

## 2022-01-26 PROCEDURE — 36415 COLL VENOUS BLD VENIPUNCTURE: CPT | Mod: PO | Performed by: INTERNAL MEDICINE

## 2022-01-26 PROCEDURE — 80053 COMPREHEN METABOLIC PANEL: CPT | Performed by: INTERNAL MEDICINE

## 2022-01-26 PROCEDURE — 85025 COMPLETE CBC W/AUTO DIFF WBC: CPT | Performed by: INTERNAL MEDICINE

## 2022-01-28 NOTE — PROGRESS NOTES
This note was created by combination of typed  and M-Modal dictation.  Transcription errors may be present.  If there are any questions, please contact me.    Assessment and Plan:   Normal physical exam  Screening for colon cancer 08/17/2015 colonoscopy diverticulosis entire colon.  Grade 1 internal hemorrhoids.  -pre visit labs reviewed    Essential hypertension  -stable.  No changes.  Refilled atenolol/chlorthalidone and potassium.  -     atenoloL-chlorthalidone (TENORETIC) 50-25 mg Tab; Take 1 tablet by mouth every morning.  Dispense: 90 tablet; Refill: 3  -     potassium chloride (KLOR-CON) 10 MEQ TbSR; Take 1 tablet (10 mEq total) by mouth once daily.  Dispense: 90 tablet; Refill: 3    Stage 3a chronic kidney disease  -creatinine stable.  No changes.  -     Comprehensive Metabolic Panel; Future; Expected date: 07/28/2022  -     CBC Auto Differential; Future; Expected date: 07/28/2022  -     Phosphorus; Future; Expected date: 07/28/2022    Pure hypercholesterolemia  -pre visit labs excellent on Lipitor.  No changes  -     atorvastatin (LIPITOR) 10 MG tablet; Take 1 tablet (10 mg total) by mouth once daily.  Dispense: 90 tablet; Refill: 3    Thalassemia, unspecified type  -stable    Medications Discontinued During This Encounter   Medication Reason    atenoloL-chlorthalidone (TENORETIC) 50-25 mg Tab Reorder    atorvastatin (LIPITOR) 10 MG tablet Reorder    potassium chloride (KLOR-CON) 10 MEQ TbSR Reorder       meds sent this encounter:  Medications Ordered This Encounter   Medications    atenoloL-chlorthalidone (TENORETIC) 50-25 mg Tab     Sig: Take 1 tablet by mouth every morning.     Dispense:  90 tablet     Refill:  3     .    atorvastatin (LIPITOR) 10 MG tablet     Sig: Take 1 tablet (10 mg total) by mouth once daily.     Dispense:  90 tablet     Refill:  3    potassium chloride (KLOR-CON) 10 MEQ TbSR     Sig: Take 1 tablet (10 mEq total) by mouth once daily.     Dispense:  90 tablet      Refill:  3       Follow Up: No follow-ups on file.  Follow-up 6 months.  Pre visit labs ordered    Subjective:     Chief Complaint   Patient presents with    Annual Exam       HPI  Yahaira is a 71 y.o. female, last appointment with this clinic was Visit date not found.  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 Patient is postmenopausal.    Last visit with me in late July  Hypertension with CKD stage IIIA.  Stable.  Hyperlipidemia/statin  Thalassemia stable    Pre visit labs  CMP microcytic anemia stable  creatinine 1.0, CKD stage IIIA  Lipid profile good with good HDL.    No complaints.  No chest pain or shortness of breath.  Taking medications as prescribed.  Blood pressure today is good.    Patient Care Team:  Raul Adkins MD as PCP - General (Internal Medicine)  Temi Ziegler MD as Consulting Physician (Internal Medicine)  Doroteo Beaver MD as Consulting Physician (Ophthalmology)  Jeremias Ca MD as Consulting Physician (Gastroenterology)  Georgie Ralph MD as Obstetrician (Obstetrics and Gynecology)  Veronica Baker MA as Care Coordinator  [facility] (Gastroenterology)    Patient Active Problem List    Diagnosis Date Noted    GERD with esophagitis on EGD 2020 EGD irregular Z-line biopsies of duodenum and stomach and GE junction obtained.  Biopsy showed mild chronic esophagitis.  Mild chronic gastritis.  H pylori negative.   Duodenum normal.      Screening for colon cancer 08/17/2015 colonoscopy diverticulosis entire colon.  Grade 1 internal hemorrhoids. 01/16/2020 08/17/2015 colonoscopy diverticulosis entire colon.  Grade 1 internal hemorrhoids.      Stage 3a chronic kidney disease 12/03/2019    Pure hypercholesterolemia 05/29/2019    Essential hypertension 05/29/2019    Status post cholecystectomy 1998 still gets bile sludge in the bile duct; Dr. 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  Review of patient's allergies indicates:  No Known Allergies    Medication List with Changes/Refills   Current Medications    ALPHAGAN P 0.1 % DROP    INSTILL 1 DROP INTO BOTH EYES THREE TIMES DAILY    ATENOLOL-CHLORTHALIDONE (TENORETIC) 50-25 MG TAB    Take 1 tablet by mouth every morning.    ATORVASTATIN (LIPITOR) 10 MG TABLET    Take 1 tablet (10 mg total) by mouth once daily.    DORZOLAMIDE-TIMOLOL 2-0.5% (COSOPT) 22.3-6.8 MG/ML OPHTHALMIC SOLUTION    Place 1 drop into both eyes 3 (three) times daily.    ESTRADIOL (ESTRACE) 0.01 % (0.1 MG/GRAM) VAGINAL CREAM    PLACE 2 GRAMS VAGINALLY 2 TIMES A WK    FLUOROMETHOLONE 0.1% (FML) 0.1 % DRPS    SHAKE LIQUID AND INSTILL 1 DROP IN BOTH EYES EVERY DAY    LATANOPROST 0.005 % OPHTHALMIC SOLUTION    Place 1 drop into both eyes every evening.    LUTEIN ORAL    Take 1 tablet by mouth every morning.    POTASSIUM CHLORIDE (KLOR-CON) 10 MEQ TBSR    Take 1  tablet (10 mEq total) by mouth once daily.    VITAMIN E 400 UNIT CAPSULE    Take 400 Units by mouth every morning.      Review of Systems   Constitutional: Negative for chills and fever.   Respiratory: Negative for shortness of breath.    Cardiovascular: Negative for chest pain.       Objective:   Physical Exam   Vitals:    01/31/22 0834   BP: 122/66   BP Location: Left arm   Patient Position: Sitting   BP Method: Medium (Manual)   Pulse: 60   Resp: 12   SpO2: 99%    There is no height or weight on file to calculate BMI.            Physical Exam  Constitutional:       General: She is not in acute distress.     Appearance: She is well-developed and well-nourished.   Eyes:      Extraocular Movements: EOM normal.   Cardiovascular:      Rate and Rhythm: Normal rate and regular rhythm.      Heart sounds: Normal heart sounds. No murmur heard.      Pulmonary:      Effort: Pulmonary effort is normal.      Breath sounds: Normal breath sounds.   Abdominal:      General: There is no distension.      Palpations: Abdomen is soft. There is no mass.      Tenderness: There is no abdominal tenderness. There is no guarding.   Musculoskeletal:         General: Normal range of motion.      Cervical back: No tenderness.      Right lower leg: No edema.      Left lower leg: No edema.   Lymphadenopathy:      Cervical: No cervical adenopathy.   Skin:     General: Skin is warm and dry.   Neurological:      Mental Status: She is alert and oriented to person, place, and time.      Coordination: Coordination normal.   Psychiatric:         Mood and Affect: Mood and affect normal.         Behavior: Behavior normal.         Thought Content: Thought content normal.         Component      Latest Ref Rng & Units 1/26/2022 1/26/2022 7/23/2021           7:12 AM  7:12 AM    WBC      3.90 - 12.70 K/uL 4.44 4.44 4.89   RBC      4.00 - 5.40 M/uL 5.05 5.05 5.12   Hemoglobin      12.0 - 16.0 g/dL 10.6 (L) 10.6 (L) 11.0 (L)   Hematocrit      37.0 - 48.5 %  36.0 (L) 36.0 (L) 36.5 (L)   MCV      82 - 98 fL 71 (L) 71 (L) 71 (L)   MCH      27.0 - 31.0 pg 21.0 (L) 21.0 (L) 21.5 (L)   MCHC      32.0 - 36.0 g/dL 29.4 (L) 29.4 (L) 30.1 (L)   RDW      11.5 - 14.5 % 15.6 (H) 15.6 (H) 16.2 (H)   Platelets      150 - 450 K/uL 206 206 196   MPV      9.2 - 12.9 fL 10.0 10.0 9.9   Immature Granulocytes      0.0 - 0.5 % 0.5 0.5 0.4   Gran # (ANC)      1.8 - 7.7 K/uL 1.9 1.9 2.1   Immature Grans (Abs)      0.00 - 0.04 K/uL 0.02 0.02 0.02   Lymph #      1.0 - 4.8 K/uL 1.8 1.8 2.0   Mono #      0.3 - 1.0 K/uL 0.4 0.4 0.4   Eos #      0.0 - 0.5 K/uL 0.2 0.2 0.3   Baso #      0.00 - 0.20 K/uL 0.05 0.05 0.06   nRBC      0 /100 WBC 0 0 0   Gran %      38.0 - 73.0 % 43.2 43.2 43.8   Lymph %      18.0 - 48.0 % 41.2 41.2 41.5   Mono %      4.0 - 15.0 % 9.0 9.0 8.0   Eosinophil %      0.0 - 8.0 % 5.0 5.0 5.1   Basophil %      0.0 - 1.9 % 1.1 1.1 1.2   Differential Method       Automated Automated Automated   Sodium      136 - 145 mmol/L  143 142   Potassium      3.5 - 5.1 mmol/L  4.1 4.5   Chloride      95 - 110 mmol/L  105 103   CO2      23 - 29 mmol/L  30 (H) 30 (H)   Glucose      70 - 110 mg/dL  95 103   BUN      8 - 23 mg/dL  18 30 (H)   Creatinine      0.5 - 1.4 mg/dL  1.0 1.1   Calcium      8.7 - 10.5 mg/dL  10.3 10.5   PROTEIN TOTAL      6.0 - 8.4 g/dL  7.3 7.6   Albumin      3.5 - 5.2 g/dL  4.0 4.1   BILIRUBIN TOTAL      0.1 - 1.0 mg/dL  0.7 0.5   Alkaline Phosphatase      55 - 135 U/L  84 75   AST      10 - 40 U/L  25 34   ALT      10 - 44 U/L  24 37   Anion Gap      8 - 16 mmol/L  8 9   eGFR if African American      >60 mL/min/1.73 m:2  >60.0 59.2 (A)   eGFR if non African American      >60 mL/min/1.73 m:2  57.2 (A) 51.3 (A)   Cholesterol      120 - 199 mg/dL  200 (H) 193   Triglycerides      30 - 150 mg/dL  69 68   HDL      40 - 75 mg/dL  89 (H) 90 (H)   LDL Cholesterol External      63.0 - 159.0 mg/dL  97.2 89.4   HDL/Cholesterol Ratio      20.0 - 50.0 %  44.5 46.6   Total  Cholesterol/HDL Ratio      2.0 - 5.0  2.2 2.1   Non-HDL Cholesterol      mg/dL  111 103   Vit D, 25-Hydroxy      30 - 96 ng/mL   40   PTH      9.0 - 77.0 pg/mL   76.0

## 2022-01-31 ENCOUNTER — OFFICE VISIT (OUTPATIENT)
Dept: FAMILY MEDICINE | Facility: CLINIC | Age: 72
End: 2022-01-31
Payer: MEDICARE

## 2022-01-31 VITALS
DIASTOLIC BLOOD PRESSURE: 66 MMHG | SYSTOLIC BLOOD PRESSURE: 122 MMHG | OXYGEN SATURATION: 99 % | RESPIRATION RATE: 12 BRPM | HEART RATE: 60 BPM

## 2022-01-31 DIAGNOSIS — N18.31 STAGE 3A CHRONIC KIDNEY DISEASE: ICD-10-CM

## 2022-01-31 DIAGNOSIS — D56.9 THALASSEMIA, UNSPECIFIED TYPE: ICD-10-CM

## 2022-01-31 DIAGNOSIS — I10 ESSENTIAL HYPERTENSION: ICD-10-CM

## 2022-01-31 DIAGNOSIS — Z12.11 SCREENING FOR COLON CANCER: ICD-10-CM

## 2022-01-31 DIAGNOSIS — Z00.00 NORMAL PHYSICAL EXAM: Primary | ICD-10-CM

## 2022-01-31 DIAGNOSIS — E78.00 PURE HYPERCHOLESTEROLEMIA: ICD-10-CM

## 2022-01-31 PROCEDURE — 99214 PR OFFICE/OUTPT VISIT, EST, LEVL IV, 30-39 MIN: ICD-10-PCS | Mod: S$GLB,,, | Performed by: INTERNAL MEDICINE

## 2022-01-31 PROCEDURE — 99499 UNLISTED E&M SERVICE: CPT | Mod: S$GLB,,, | Performed by: INTERNAL MEDICINE

## 2022-01-31 PROCEDURE — 1160F RVW MEDS BY RX/DR IN RCRD: CPT | Mod: CPTII,S$GLB,, | Performed by: INTERNAL MEDICINE

## 2022-01-31 PROCEDURE — 1159F MED LIST DOCD IN RCRD: CPT | Mod: CPTII,S$GLB,, | Performed by: INTERNAL MEDICINE

## 2022-01-31 PROCEDURE — 99499 RISK ADDL DX/OHS AUDIT: ICD-10-PCS | Mod: S$GLB,,, | Performed by: INTERNAL MEDICINE

## 2022-01-31 PROCEDURE — 99999 PR PBB SHADOW E&M-EST. PATIENT-LVL III: CPT | Mod: PBBFAC,,, | Performed by: INTERNAL MEDICINE

## 2022-01-31 PROCEDURE — 3074F SYST BP LT 130 MM HG: CPT | Mod: CPTII,S$GLB,, | Performed by: INTERNAL MEDICINE

## 2022-01-31 PROCEDURE — 99214 OFFICE O/P EST MOD 30 MIN: CPT | Mod: S$GLB,,, | Performed by: INTERNAL MEDICINE

## 2022-01-31 PROCEDURE — 1159F PR MEDICATION LIST DOCUMENTED IN MEDICAL RECORD: ICD-10-PCS | Mod: CPTII,S$GLB,, | Performed by: INTERNAL MEDICINE

## 2022-01-31 PROCEDURE — 3074F PR MOST RECENT SYSTOLIC BLOOD PRESSURE < 130 MM HG: ICD-10-PCS | Mod: CPTII,S$GLB,, | Performed by: INTERNAL MEDICINE

## 2022-01-31 PROCEDURE — 3078F DIAST BP <80 MM HG: CPT | Mod: CPTII,S$GLB,, | Performed by: INTERNAL MEDICINE

## 2022-01-31 PROCEDURE — 1160F PR REVIEW ALL MEDS BY PRESCRIBER/CLIN PHARMACIST DOCUMENTED: ICD-10-PCS | Mod: CPTII,S$GLB,, | Performed by: INTERNAL MEDICINE

## 2022-01-31 PROCEDURE — 3078F PR MOST RECENT DIASTOLIC BLOOD PRESSURE < 80 MM HG: ICD-10-PCS | Mod: CPTII,S$GLB,, | Performed by: INTERNAL MEDICINE

## 2022-01-31 PROCEDURE — 99999 PR PBB SHADOW E&M-EST. PATIENT-LVL III: ICD-10-PCS | Mod: PBBFAC,,, | Performed by: INTERNAL MEDICINE

## 2022-01-31 RX ORDER — ATORVASTATIN CALCIUM 10 MG/1
10 TABLET, FILM COATED ORAL DAILY
Qty: 90 TABLET | Refills: 3 | Status: SHIPPED | OUTPATIENT
Start: 2022-01-31 | End: 2023-01-30 | Stop reason: SDUPTHER

## 2022-01-31 RX ORDER — POTASSIUM CHLORIDE 750 MG/1
10 TABLET, EXTENDED RELEASE ORAL DAILY
Qty: 90 TABLET | Refills: 3 | Status: SHIPPED | OUTPATIENT
Start: 2022-01-31 | End: 2023-01-30 | Stop reason: SDUPTHER

## 2022-01-31 RX ORDER — ATENOLOL AND CHLORTHALIDONE TABLET 50; 25 MG/1; MG/1
1 TABLET ORAL EVERY MORNING
Qty: 90 TABLET | Refills: 3 | Status: SHIPPED | OUTPATIENT
Start: 2022-01-31 | End: 2023-01-30 | Stop reason: SDUPTHER

## 2022-02-01 ENCOUNTER — PES CALL (OUTPATIENT)
Dept: ADMINISTRATIVE | Facility: CLINIC | Age: 72
End: 2022-02-01
Payer: MEDICARE

## 2022-02-09 RX ORDER — ATENOLOL AND CHLORTHALIDONE TABLET 50; 25 MG/1; MG/1
1 TABLET ORAL EVERY MORNING
Qty: 90 TABLET | Refills: 3 | OUTPATIENT
Start: 2022-02-09

## 2022-02-09 RX ORDER — POTASSIUM CHLORIDE 750 MG/1
TABLET, EXTENDED RELEASE ORAL
Qty: 90 TABLET | Refills: 3 | OUTPATIENT
Start: 2022-02-09

## 2022-02-18 ENCOUNTER — PES CALL (OUTPATIENT)
Dept: ADMINISTRATIVE | Facility: CLINIC | Age: 72
End: 2022-02-18
Payer: MEDICARE

## 2022-02-22 ENCOUNTER — PES CALL (OUTPATIENT)
Dept: ADMINISTRATIVE | Facility: CLINIC | Age: 72
End: 2022-02-22
Payer: MEDICARE

## 2022-04-04 ENCOUNTER — PATIENT MESSAGE (OUTPATIENT)
Dept: OPHTHALMOLOGY | Facility: CLINIC | Age: 72
End: 2022-04-04
Payer: MEDICARE

## 2022-04-05 ENCOUNTER — PES CALL (OUTPATIENT)
Dept: ADMINISTRATIVE | Facility: CLINIC | Age: 72
End: 2022-04-05
Payer: MEDICARE

## 2022-04-26 ENCOUNTER — OFFICE VISIT (OUTPATIENT)
Dept: OPHTHALMOLOGY | Facility: CLINIC | Age: 72
End: 2022-04-26
Payer: MEDICARE

## 2022-04-26 DIAGNOSIS — Q15.0 CONGENITAL GLAUCOMA OF BOTH EYES: ICD-10-CM

## 2022-04-26 DIAGNOSIS — Z97.0 EYE GLOBE PROSTHESIS: ICD-10-CM

## 2022-04-26 DIAGNOSIS — Z96.1 STATUS POST CATARACT EXTRACTION AND INSERTION OF INTRAOCULAR LENS OF RIGHT EYE: ICD-10-CM

## 2022-04-26 DIAGNOSIS — Z94.7 TRANSPLANTED CORNEA: ICD-10-CM

## 2022-04-26 DIAGNOSIS — H40.31X3 GLAUCOMA OF RIGHT EYE ASSOCIATED WITH OCULAR TRAUMA, SEVERE STAGE: Primary | ICD-10-CM

## 2022-04-26 DIAGNOSIS — Z98.41 STATUS POST CATARACT EXTRACTION AND INSERTION OF INTRAOCULAR LENS OF RIGHT EYE: ICD-10-CM

## 2022-04-26 PROCEDURE — 99999 PR PBB SHADOW E&M-EST. PATIENT-LVL III: ICD-10-PCS | Mod: PBBFAC,,, | Performed by: OPHTHALMOLOGY

## 2022-04-26 PROCEDURE — 1126F AMNT PAIN NOTED NONE PRSNT: CPT | Mod: CPTII,S$GLB,, | Performed by: OPHTHALMOLOGY

## 2022-04-26 PROCEDURE — 1159F MED LIST DOCD IN RCRD: CPT | Mod: CPTII,S$GLB,, | Performed by: OPHTHALMOLOGY

## 2022-04-26 PROCEDURE — 1101F PR PT FALLS ASSESS DOC 0-1 FALLS W/OUT INJ PAST YR: ICD-10-PCS | Mod: CPTII,S$GLB,, | Performed by: OPHTHALMOLOGY

## 2022-04-26 PROCEDURE — 99214 OFFICE O/P EST MOD 30 MIN: CPT | Mod: S$GLB,,, | Performed by: OPHTHALMOLOGY

## 2022-04-26 PROCEDURE — 3288F FALL RISK ASSESSMENT DOCD: CPT | Mod: CPTII,S$GLB,, | Performed by: OPHTHALMOLOGY

## 2022-04-26 PROCEDURE — 1101F PT FALLS ASSESS-DOCD LE1/YR: CPT | Mod: CPTII,S$GLB,, | Performed by: OPHTHALMOLOGY

## 2022-04-26 PROCEDURE — 3288F PR FALLS RISK ASSESSMENT DOCUMENTED: ICD-10-PCS | Mod: CPTII,S$GLB,, | Performed by: OPHTHALMOLOGY

## 2022-04-26 PROCEDURE — 1126F PR PAIN SEVERITY QUANTIFIED, NO PAIN PRESENT: ICD-10-PCS | Mod: CPTII,S$GLB,, | Performed by: OPHTHALMOLOGY

## 2022-04-26 PROCEDURE — 99999 PR PBB SHADOW E&M-EST. PATIENT-LVL III: CPT | Mod: PBBFAC,,, | Performed by: OPHTHALMOLOGY

## 2022-04-26 PROCEDURE — 1160F PR REVIEW ALL MEDS BY PRESCRIBER/CLIN PHARMACIST DOCUMENTED: ICD-10-PCS | Mod: CPTII,S$GLB,, | Performed by: OPHTHALMOLOGY

## 2022-04-26 PROCEDURE — 1160F RVW MEDS BY RX/DR IN RCRD: CPT | Mod: CPTII,S$GLB,, | Performed by: OPHTHALMOLOGY

## 2022-04-26 PROCEDURE — 99214 PR OFFICE/OUTPT VISIT, EST, LEVL IV, 30-39 MIN: ICD-10-PCS | Mod: S$GLB,,, | Performed by: OPHTHALMOLOGY

## 2022-04-26 PROCEDURE — 1159F PR MEDICATION LIST DOCUMENTED IN MEDICAL RECORD: ICD-10-PCS | Mod: CPTII,S$GLB,, | Performed by: OPHTHALMOLOGY

## 2022-04-26 NOTE — PROGRESS NOTES
Assessment /Plan     For exam results, see Encounter Report.    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    Traumatic Glaucoma OD  Congenital Glaucoma  Prosthesis OS    CCT  530    Mid-low teens --> less than ideal & reviewed with patient --> discussed options      Right Eye --> good adherence --> CSM  Cosopt TID  Alphagan TID  Xal q day  FML 5 days / week     Giovani TID --> holding  Beni q day --> not covered    Diamox --> Intol Hypo K --> now on K suppl -->  D 250 BID --> Holding  Tolerated well in past    Right eye MP3 / G6 Laser 2019 --> Re-consider Repeat as re-discussed --> G-Probe  Pre-Tx @ 27, 19, 24      PC IOL OD  Quiet  Observe    Monocular precautions    Prosthesis  Conj bed quiet 2019  fu with       Plan  RTC 3 months IOP & Adherence & OCT RNFL  RTC sooner prn with good understanding

## 2022-07-25 ENCOUNTER — LAB VISIT (OUTPATIENT)
Dept: LAB | Facility: HOSPITAL | Age: 72
End: 2022-07-25
Attending: INTERNAL MEDICINE
Payer: MEDICARE

## 2022-07-25 DIAGNOSIS — N18.31 STAGE 3A CHRONIC KIDNEY DISEASE: ICD-10-CM

## 2022-07-25 LAB
ALBUMIN SERPL BCP-MCNC: 4.2 G/DL (ref 3.5–5.2)
ALP SERPL-CCNC: 88 U/L (ref 55–135)
ALT SERPL W/O P-5'-P-CCNC: 38 U/L (ref 10–44)
ANION GAP SERPL CALC-SCNC: 10 MMOL/L (ref 8–16)
AST SERPL-CCNC: 30 U/L (ref 10–40)
BASOPHILS # BLD AUTO: 0.04 K/UL (ref 0–0.2)
BASOPHILS NFR BLD: 0.8 % (ref 0–1.9)
BILIRUB SERPL-MCNC: 0.8 MG/DL (ref 0.1–1)
BUN SERPL-MCNC: 22 MG/DL (ref 8–23)
CALCIUM SERPL-MCNC: 10.1 MG/DL (ref 8.7–10.5)
CHLORIDE SERPL-SCNC: 100 MMOL/L (ref 95–110)
CO2 SERPL-SCNC: 28 MMOL/L (ref 23–29)
CREAT SERPL-MCNC: 1.1 MG/DL (ref 0.5–1.4)
DIFFERENTIAL METHOD: ABNORMAL
EOSINOPHIL # BLD AUTO: 0.1 K/UL (ref 0–0.5)
EOSINOPHIL NFR BLD: 2.6 % (ref 0–8)
ERYTHROCYTE [DISTWIDTH] IN BLOOD BY AUTOMATED COUNT: 15.8 % (ref 11.5–14.5)
EST. GFR  (AFRICAN AMERICAN): 58.8 ML/MIN/1.73 M^2
EST. GFR  (NON AFRICAN AMERICAN): 51 ML/MIN/1.73 M^2
GLUCOSE SERPL-MCNC: 103 MG/DL (ref 70–110)
HCT VFR BLD AUTO: 37.4 % (ref 37–48.5)
HGB BLD-MCNC: 11 G/DL (ref 12–16)
IMM GRANULOCYTES # BLD AUTO: 0.02 K/UL (ref 0–0.04)
IMM GRANULOCYTES NFR BLD AUTO: 0.4 % (ref 0–0.5)
LYMPHOCYTES # BLD AUTO: 1.7 K/UL (ref 1–4.8)
LYMPHOCYTES NFR BLD: 34.6 % (ref 18–48)
MCH RBC QN AUTO: 21 PG (ref 27–31)
MCHC RBC AUTO-ENTMCNC: 29.4 G/DL (ref 32–36)
MCV RBC AUTO: 71 FL (ref 82–98)
MONOCYTES # BLD AUTO: 0.4 K/UL (ref 0.3–1)
MONOCYTES NFR BLD: 7.6 % (ref 4–15)
NEUTROPHILS # BLD AUTO: 2.7 K/UL (ref 1.8–7.7)
NEUTROPHILS NFR BLD: 54 % (ref 38–73)
NRBC BLD-RTO: 0 /100 WBC
PHOSPHATE SERPL-MCNC: 3.1 MG/DL (ref 2.7–4.5)
PLATELET # BLD AUTO: 230 K/UL (ref 150–450)
PMV BLD AUTO: 10.3 FL (ref 9.2–12.9)
POTASSIUM SERPL-SCNC: 3.4 MMOL/L (ref 3.5–5.1)
PROT SERPL-MCNC: 7.7 G/DL (ref 6–8.4)
RBC # BLD AUTO: 5.25 M/UL (ref 4–5.4)
SODIUM SERPL-SCNC: 138 MMOL/L (ref 136–145)
WBC # BLD AUTO: 4.97 K/UL (ref 3.9–12.7)

## 2022-07-25 PROCEDURE — 84100 ASSAY OF PHOSPHORUS: CPT | Performed by: INTERNAL MEDICINE

## 2022-07-25 PROCEDURE — 80053 COMPREHEN METABOLIC PANEL: CPT | Performed by: INTERNAL MEDICINE

## 2022-07-25 PROCEDURE — 36415 COLL VENOUS BLD VENIPUNCTURE: CPT | Mod: PO | Performed by: INTERNAL MEDICINE

## 2022-07-25 PROCEDURE — 85025 COMPLETE CBC W/AUTO DIFF WBC: CPT | Performed by: INTERNAL MEDICINE

## 2022-07-27 NOTE — PROGRESS NOTES
This note was created by combination of typed  and M-Modal dictation.  Transcription errors may be present.  If there are any questions, please contact me.    Assessment and Plan:   Essential hypertension  Stage 3a chronic kidney disease  -stable.  K low but had been missing doses of her medications.  She has been taking it more regularly since she reviewed her lab results.  Interestingly feels more energy with this.  Check future labs  -     Comprehensive Metabolic Panel; Future; Expected date: 2023  -     CBC Auto Differential; Future; Expected date: 2023  -     Phosphorus; Future; Expected date: 2023    Pure hypercholesterolemia  -check future labs on low-dose statin  -     Lipid Panel; Future; Expected date: 2023    Thalassemia, unspecified type  -stable    Osteopenia of multiple sites on bone density scan   -we talked about need to update DEXA scan, she is wary and declines at this time to schedule.  She may consider it for the next visit.  If bisphosphonate indicated I think her creatinine can support it.    There are no discontinued medications.    meds sent this encounter:       Follow Up: No follow-ups on file.  Follow-up 6 months with labs    Subjective:     Chief Complaint   Patient presents with    Follow-up       HPI  Yahaira is a 71 y.o. female, last appointment with this clinic was 2022.  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 Patient is postmenopausal.    Last visit  me in January  Hypertension stable  CKD stage IIIA stable  Lipid/statin pre visit labs good    Pre visit labs  CBC microcytic anemia known thalassemia  CMP mild hypokalemia creatinine 1.1 stable CKD stage IIIA    On k dur    Notes she had been missing doses.  Then she got the results so she's been taking more regularly.  With improvement in energy.      No home BP checks.  Has a home cuff but not using.  Blood pressure good on intake.    Needs DEXA but pt declines to check.  May consider next year.  Last check 2015, osteopenia of the hip.      Patient Care Team:  Raul Adkins MD as PCP - General (Internal Medicine)  Temi Ziegler MD as Consulting Physician (Internal Medicine)  Doroteo Beaver MD as Consulting Physician (Ophthalmology)  Jeremias Ca MD as Consulting Physician (Gastroenterology)  Georgie Ralph MD as Obstetrician (Obstetrics and Gynecology)  Veronica Baker MA as Care Coordinator  [facility] (Gastroenterology)    Patient Active Problem List    Diagnosis Date Noted    GERD with esophagitis on EGD 8/2015 01/16/2020 08/17/2015 EGD irregular Z-line biopsies of duodenum and stomach and GE junction obtained.  Biopsy showed mild chronic esophagitis.  Mild chronic gastritis.  H pylori negative.  Duodenum normal.      Screening for colon cancer 08/17/2015 colonoscopy diverticulosis entire colon.  Grade 1 internal hemorrhoids. 01/16/2020 08/17/2015 colonoscopy diverticulosis entire colon.  Grade 1 internal hemorrhoids.      Stage 3a chronic kidney disease 12/03/2019    Pure hypercholesterolemia 05/29/2019    Essential hypertension 05/29/2019    Status post cholecystectomy 1998 still gets bile sludge in the bile duct; Dr. 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  Review of patient's allergies indicates:  No Known Allergies    Medication List with Changes/Refills   Current Medications    ALPHAGAN P 0.1 % DROP    INSTILL 1 DROP INTO BOTH EYES THREE TIMES DAILY    ATENOLOL-CHLORTHALIDONE (TENORETIC) 50-25 MG TAB    Take 1 tablet by mouth every morning.    ATORVASTATIN (LIPITOR) 10 MG TABLET    Take 1 tablet (10 mg total) by mouth once daily.    DORZOLAMIDE-TIMOLOL 2-0.5% (COSOPT) 22.3-6.8 MG/ML OPHTHALMIC SOLUTION    Place 1 drop into both eyes 3 (three) times daily.    ESTRADIOL (ESTRACE) 0.01 % (0.1 MG/GRAM) VAGINAL CREAM    PLACE 2 GRAMS VAGINALLY 2 TIMES A WK    FLUOROMETHOLONE 0.1% (FML) 0.1 % DRPS    SHAKE LIQUID AND INSTILL 1 DROP IN BOTH EYES EVERY DAY    LATANOPROST 0.005 % OPHTHALMIC SOLUTION    Place 1 drop into both eyes every evening.    LUTEIN ORAL    Take 1 tablet by mouth every morning.    POTASSIUM CHLORIDE (KLOR-CON) 10 MEQ TBSR    Take 1 tablet (10 mEq total) by mouth once daily.    VITAMIN E 400 UNIT CAPSULE    Take 400 Units by mouth every morning.        Objective:   Physical Exam   Vitals:    07/28/22 0801   BP: 112/60   Pulse: 62   Temp: 98.4 °F (36.9 °C)   TempSrc: Oral   SpO2: 97%   Weight: 63.7 kg (140 lb 8.7 oz)   Height: 5' 2" (1.575 m)    Body mass index is 25.71 kg/m².  Weight: 63.7 kg (140 lb 8.7 oz)   Height: 5' 2" (157.5 cm)     Physical Exam  Constitutional:       General: She is not in acute distress.     Appearance: She is well-developed.   HENT:      Right Ear: Tympanic membrane, ear canal and external ear normal.      Left Ear: Tympanic membrane, ear canal and " external ear normal.   Eyes:      Comments: L eye prosthesis   Cardiovascular:      Rate and Rhythm: Normal rate and regular rhythm.      Heart sounds: Normal heart sounds. No murmur heard.  Pulmonary:      Effort: Pulmonary effort is normal.      Breath sounds: Normal breath sounds.   Musculoskeletal:         General: Normal range of motion.      Right lower leg: No edema.      Left lower leg: No edema.   Skin:     General: Skin is warm and dry.   Neurological:      Mental Status: She is alert and oriented to person, place, and time.      Coordination: Coordination normal.   Psychiatric:         Behavior: Behavior normal.         Thought Content: Thought content normal.         Component      Latest Ref Rng & Units 7/25/2022 1/26/2022 1/26/2022 7/23/2021            7:12 AM  7:12 AM    WBC      3.90 - 12.70 K/uL 4.97 4.44 4.44    RBC      4.00 - 5.40 M/uL 5.25 5.05 5.05    Hemoglobin      12.0 - 16.0 g/dL 11.0 (L) 10.6 (L) 10.6 (L)    Hematocrit      37.0 - 48.5 % 37.4 36.0 (L) 36.0 (L)    MCV      82 - 98 fL 71 (L) 71 (L) 71 (L)    MCH      27.0 - 31.0 pg 21.0 (L) 21.0 (L) 21.0 (L)    MCHC      32.0 - 36.0 g/dL 29.4 (L) 29.4 (L) 29.4 (L)    RDW      11.5 - 14.5 % 15.8 (H) 15.6 (H) 15.6 (H)    Platelets      150 - 450 K/uL 230 206 206    MPV      9.2 - 12.9 fL 10.3 10.0 10.0    Immature Granulocytes      0.0 - 0.5 % 0.4 0.5 0.5    Gran # (ANC)      1.8 - 7.7 K/uL 2.7 1.9 1.9    Immature Grans (Abs)      0.00 - 0.04 K/uL 0.02 0.02 0.02    Lymph #      1.0 - 4.8 K/uL 1.7 1.8 1.8    Mono #      0.3 - 1.0 K/uL 0.4 0.4 0.4    Eos #      0.0 - 0.5 K/uL 0.1 0.2 0.2    Baso #      0.00 - 0.20 K/uL 0.04 0.05 0.05    nRBC      0 /100 WBC 0 0 0    Gran %      38.0 - 73.0 % 54.0 43.2 43.2    Lymph %      18.0 - 48.0 % 34.6 41.2 41.2    Mono %      4.0 - 15.0 % 7.6 9.0 9.0    Eosinophil %      0.0 - 8.0 % 2.6 5.0 5.0    Basophil %      0.0 - 1.9 % 0.8 1.1 1.1    Differential Method       Automated Automated Automated    Sodium       136 - 145 mmol/L 138  143    Potassium      3.5 - 5.1 mmol/L 3.4 (L)  4.1    Chloride      95 - 110 mmol/L 100  105    CO2      23 - 29 mmol/L 28  30 (H)    Glucose      70 - 110 mg/dL 103  95    BUN      8 - 23 mg/dL 22  18    Creatinine      0.5 - 1.4 mg/dL 1.1  1.0    Calcium      8.7 - 10.5 mg/dL 10.1  10.3    PROTEIN TOTAL      6.0 - 8.4 g/dL 7.7  7.3    Albumin      3.5 - 5.2 g/dL 4.2  4.0    BILIRUBIN TOTAL      0.1 - 1.0 mg/dL 0.8  0.7    Alkaline Phosphatase      55 - 135 U/L 88  84    AST      10 - 40 U/L 30  25    ALT      10 - 44 U/L 38  24    Anion Gap      8 - 16 mmol/L 10  8    eGFR if African American      >60 mL/min/1.73 m:2 58.8 (A)  >60.0    eGFR if non African American      >60 mL/min/1.73 m:2 51.0 (A)  57.2 (A)    Cholesterol      120 - 199 mg/dL   200 (H)    Triglycerides      30 - 150 mg/dL   69    HDL      40 - 75 mg/dL   89 (H)    LDL Cholesterol External      63.0 - 159.0 mg/dL   97.2    HDL/Cholesterol Ratio      20.0 - 50.0 %   44.5    Total Cholesterol/HDL Ratio      2.0 - 5.0   2.2    Non-HDL Cholesterol      mg/dL   111    Vit D, 25-Hydroxy      30 - 96 ng/mL    40   PTH      9.0 - 77.0 pg/mL    76.0   Phosphorus      2.7 - 4.5 mg/dL 3.1

## 2022-07-28 ENCOUNTER — OFFICE VISIT (OUTPATIENT)
Dept: FAMILY MEDICINE | Facility: CLINIC | Age: 72
End: 2022-07-28
Payer: MEDICARE

## 2022-07-28 VITALS
DIASTOLIC BLOOD PRESSURE: 60 MMHG | HEIGHT: 62 IN | TEMPERATURE: 98 F | SYSTOLIC BLOOD PRESSURE: 112 MMHG | OXYGEN SATURATION: 97 % | WEIGHT: 140.56 LBS | HEART RATE: 62 BPM | BODY MASS INDEX: 25.87 KG/M2

## 2022-07-28 DIAGNOSIS — D56.9 THALASSEMIA, UNSPECIFIED TYPE: ICD-10-CM

## 2022-07-28 DIAGNOSIS — I10 ESSENTIAL HYPERTENSION: Primary | ICD-10-CM

## 2022-07-28 DIAGNOSIS — E78.00 PURE HYPERCHOLESTEROLEMIA: ICD-10-CM

## 2022-07-28 DIAGNOSIS — M85.89 OSTEOPENIA OF MULTIPLE SITES: ICD-10-CM

## 2022-07-28 DIAGNOSIS — N18.31 STAGE 3A CHRONIC KIDNEY DISEASE: ICD-10-CM

## 2022-07-28 PROCEDURE — 3074F SYST BP LT 130 MM HG: CPT | Mod: CPTII,S$GLB,, | Performed by: INTERNAL MEDICINE

## 2022-07-28 PROCEDURE — 1159F MED LIST DOCD IN RCRD: CPT | Mod: CPTII,S$GLB,, | Performed by: INTERNAL MEDICINE

## 2022-07-28 PROCEDURE — 3078F PR MOST RECENT DIASTOLIC BLOOD PRESSURE < 80 MM HG: ICD-10-PCS | Mod: CPTII,S$GLB,, | Performed by: INTERNAL MEDICINE

## 2022-07-28 PROCEDURE — 3288F PR FALLS RISK ASSESSMENT DOCUMENTED: ICD-10-PCS | Mod: CPTII,S$GLB,, | Performed by: INTERNAL MEDICINE

## 2022-07-28 PROCEDURE — 3074F PR MOST RECENT SYSTOLIC BLOOD PRESSURE < 130 MM HG: ICD-10-PCS | Mod: CPTII,S$GLB,, | Performed by: INTERNAL MEDICINE

## 2022-07-28 PROCEDURE — 3008F BODY MASS INDEX DOCD: CPT | Mod: CPTII,S$GLB,, | Performed by: INTERNAL MEDICINE

## 2022-07-28 PROCEDURE — 99214 OFFICE O/P EST MOD 30 MIN: CPT | Mod: S$GLB,,, | Performed by: INTERNAL MEDICINE

## 2022-07-28 PROCEDURE — 99999 PR PBB SHADOW E&M-EST. PATIENT-LVL IV: CPT | Mod: PBBFAC,,, | Performed by: INTERNAL MEDICINE

## 2022-07-28 PROCEDURE — 99214 PR OFFICE/OUTPT VISIT, EST, LEVL IV, 30-39 MIN: ICD-10-PCS | Mod: S$GLB,,, | Performed by: INTERNAL MEDICINE

## 2022-07-28 PROCEDURE — 1126F AMNT PAIN NOTED NONE PRSNT: CPT | Mod: CPTII,S$GLB,, | Performed by: INTERNAL MEDICINE

## 2022-07-28 PROCEDURE — 1101F PR PT FALLS ASSESS DOC 0-1 FALLS W/OUT INJ PAST YR: ICD-10-PCS | Mod: CPTII,S$GLB,, | Performed by: INTERNAL MEDICINE

## 2022-07-28 PROCEDURE — 3288F FALL RISK ASSESSMENT DOCD: CPT | Mod: CPTII,S$GLB,, | Performed by: INTERNAL MEDICINE

## 2022-07-28 PROCEDURE — 1101F PT FALLS ASSESS-DOCD LE1/YR: CPT | Mod: CPTII,S$GLB,, | Performed by: INTERNAL MEDICINE

## 2022-07-28 PROCEDURE — 3078F DIAST BP <80 MM HG: CPT | Mod: CPTII,S$GLB,, | Performed by: INTERNAL MEDICINE

## 2022-07-28 PROCEDURE — 1126F PR PAIN SEVERITY QUANTIFIED, NO PAIN PRESENT: ICD-10-PCS | Mod: CPTII,S$GLB,, | Performed by: INTERNAL MEDICINE

## 2022-07-28 PROCEDURE — 3008F PR BODY MASS INDEX (BMI) DOCUMENTED: ICD-10-PCS | Mod: CPTII,S$GLB,, | Performed by: INTERNAL MEDICINE

## 2022-07-28 PROCEDURE — 99999 PR PBB SHADOW E&M-EST. PATIENT-LVL IV: ICD-10-PCS | Mod: PBBFAC,,, | Performed by: INTERNAL MEDICINE

## 2022-07-28 PROCEDURE — 1159F PR MEDICATION LIST DOCUMENTED IN MEDICAL RECORD: ICD-10-PCS | Mod: CPTII,S$GLB,, | Performed by: INTERNAL MEDICINE

## 2022-08-05 ENCOUNTER — OFFICE VISIT (OUTPATIENT)
Dept: OPHTHALMOLOGY | Facility: CLINIC | Age: 72
End: 2022-08-05
Payer: MEDICARE

## 2022-08-05 DIAGNOSIS — Q15.0 CONGENITAL GLAUCOMA OF BOTH EYES: Primary | ICD-10-CM

## 2022-08-05 DIAGNOSIS — Z97.0 EYE GLOBE PROSTHESIS: ICD-10-CM

## 2022-08-05 DIAGNOSIS — Z98.41 STATUS POST CATARACT EXTRACTION AND INSERTION OF INTRAOCULAR LENS OF RIGHT EYE: ICD-10-CM

## 2022-08-05 DIAGNOSIS — H40.31X3 GLAUCOMA OF RIGHT EYE ASSOCIATED WITH OCULAR TRAUMA, SEVERE STAGE: ICD-10-CM

## 2022-08-05 DIAGNOSIS — Z94.7 TRANSPLANTED CORNEA: ICD-10-CM

## 2022-08-05 DIAGNOSIS — Z96.1 STATUS POST CATARACT EXTRACTION AND INSERTION OF INTRAOCULAR LENS OF RIGHT EYE: ICD-10-CM

## 2022-08-05 PROCEDURE — 1160F PR REVIEW ALL MEDS BY PRESCRIBER/CLIN PHARMACIST DOCUMENTED: ICD-10-PCS | Mod: CPTII,S$GLB,, | Performed by: OPHTHALMOLOGY

## 2022-08-05 PROCEDURE — 3288F FALL RISK ASSESSMENT DOCD: CPT | Mod: CPTII,S$GLB,, | Performed by: OPHTHALMOLOGY

## 2022-08-05 PROCEDURE — 99999 PR PBB SHADOW E&M-EST. PATIENT-LVL III: CPT | Mod: PBBFAC,,, | Performed by: OPHTHALMOLOGY

## 2022-08-05 PROCEDURE — 1160F RVW MEDS BY RX/DR IN RCRD: CPT | Mod: CPTII,S$GLB,, | Performed by: OPHTHALMOLOGY

## 2022-08-05 PROCEDURE — 1159F PR MEDICATION LIST DOCUMENTED IN MEDICAL RECORD: ICD-10-PCS | Mod: CPTII,S$GLB,, | Performed by: OPHTHALMOLOGY

## 2022-08-05 PROCEDURE — 1101F PR PT FALLS ASSESS DOC 0-1 FALLS W/OUT INJ PAST YR: ICD-10-PCS | Mod: CPTII,S$GLB,, | Performed by: OPHTHALMOLOGY

## 2022-08-05 PROCEDURE — 92133 POSTERIOR SEGMENT OCT OPTIC NERVE(OCULAR COHERENCE TOMOGRAPHY) - OU - BOTH EYES: ICD-10-PCS | Mod: S$GLB,,, | Performed by: OPHTHALMOLOGY

## 2022-08-05 PROCEDURE — 92133 CPTRZD OPH DX IMG PST SGM ON: CPT | Mod: S$GLB,,, | Performed by: OPHTHALMOLOGY

## 2022-08-05 PROCEDURE — 99999 PR PBB SHADOW E&M-EST. PATIENT-LVL III: ICD-10-PCS | Mod: PBBFAC,,, | Performed by: OPHTHALMOLOGY

## 2022-08-05 PROCEDURE — 1126F PR PAIN SEVERITY QUANTIFIED, NO PAIN PRESENT: ICD-10-PCS | Mod: CPTII,S$GLB,, | Performed by: OPHTHALMOLOGY

## 2022-08-05 PROCEDURE — 99214 PR OFFICE/OUTPT VISIT, EST, LEVL IV, 30-39 MIN: ICD-10-PCS | Mod: S$GLB,,, | Performed by: OPHTHALMOLOGY

## 2022-08-05 PROCEDURE — 1159F MED LIST DOCD IN RCRD: CPT | Mod: CPTII,S$GLB,, | Performed by: OPHTHALMOLOGY

## 2022-08-05 PROCEDURE — 99214 OFFICE O/P EST MOD 30 MIN: CPT | Mod: S$GLB,,, | Performed by: OPHTHALMOLOGY

## 2022-08-05 PROCEDURE — 1101F PT FALLS ASSESS-DOCD LE1/YR: CPT | Mod: CPTII,S$GLB,, | Performed by: OPHTHALMOLOGY

## 2022-08-05 PROCEDURE — 1126F AMNT PAIN NOTED NONE PRSNT: CPT | Mod: CPTII,S$GLB,, | Performed by: OPHTHALMOLOGY

## 2022-08-05 PROCEDURE — 3288F PR FALLS RISK ASSESSMENT DOCUMENTED: ICD-10-PCS | Mod: CPTII,S$GLB,, | Performed by: OPHTHALMOLOGY

## 2022-08-05 NOTE — PROGRESS NOTES
Assessment /Plan     For exam results, see Encounter Report.    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    Traumatic Glaucoma OD  Congenital Glaucoma  Prosthesis OS    CCT  530    Mid-low teens --> less than ideal &  close    Right Eye --> good adherence --> CSM  Cosopt TID  Alphagan TID  Xal q day  FML 5 days / week     Giovani TID --> holding  Beni q day --> not covered    Diamox --> Intol Hypo K --> now on K suppl -->  D 250 BID --> Holding  Tolerated well in past    Right eye MP3 / G6 Laser 2019    --> re-discussed --> G-Probe  Pre-Tx @ 27, 19, 24      PC IOL OD  Quiet  Observe    Monocular precautions    Prosthesis  Conj bed quiet 2019  fu with       Plan  RTC 4 months IOP & Adherence  RTC sooner prn with good understanding

## 2022-09-22 ENCOUNTER — PES CALL (OUTPATIENT)
Dept: ADMINISTRATIVE | Facility: CLINIC | Age: 72
End: 2022-09-22
Payer: MEDICARE

## 2022-12-16 ENCOUNTER — PES CALL (OUTPATIENT)
Dept: ADMINISTRATIVE | Facility: CLINIC | Age: 72
End: 2022-12-16
Payer: MEDICARE

## 2022-12-19 ENCOUNTER — OFFICE VISIT (OUTPATIENT)
Dept: OPHTHALMOLOGY | Facility: CLINIC | Age: 72
End: 2022-12-19
Payer: MEDICARE

## 2022-12-19 DIAGNOSIS — Z96.1 STATUS POST CATARACT EXTRACTION AND INSERTION OF INTRAOCULAR LENS OF RIGHT EYE: ICD-10-CM

## 2022-12-19 DIAGNOSIS — H40.31X3 GLAUCOMA OF RIGHT EYE ASSOCIATED WITH OCULAR TRAUMA, SEVERE STAGE: ICD-10-CM

## 2022-12-19 DIAGNOSIS — Z97.0 EYE GLOBE PROSTHESIS: ICD-10-CM

## 2022-12-19 DIAGNOSIS — Z98.41 STATUS POST CATARACT EXTRACTION AND INSERTION OF INTRAOCULAR LENS OF RIGHT EYE: ICD-10-CM

## 2022-12-19 DIAGNOSIS — Q15.0 CONGENITAL GLAUCOMA OF BOTH EYES: Primary | ICD-10-CM

## 2022-12-19 DIAGNOSIS — Z94.7 TRANSPLANTED CORNEA: ICD-10-CM

## 2022-12-19 PROCEDURE — 1160F RVW MEDS BY RX/DR IN RCRD: CPT | Mod: CPTII,S$GLB,, | Performed by: OPHTHALMOLOGY

## 2022-12-19 PROCEDURE — 1126F PR PAIN SEVERITY QUANTIFIED, NO PAIN PRESENT: ICD-10-PCS | Mod: CPTII,S$GLB,, | Performed by: OPHTHALMOLOGY

## 2022-12-19 PROCEDURE — 1159F PR MEDICATION LIST DOCUMENTED IN MEDICAL RECORD: ICD-10-PCS | Mod: CPTII,S$GLB,, | Performed by: OPHTHALMOLOGY

## 2022-12-19 PROCEDURE — 1160F PR REVIEW ALL MEDS BY PRESCRIBER/CLIN PHARMACIST DOCUMENTED: ICD-10-PCS | Mod: CPTII,S$GLB,, | Performed by: OPHTHALMOLOGY

## 2022-12-19 PROCEDURE — 1101F PT FALLS ASSESS-DOCD LE1/YR: CPT | Mod: CPTII,S$GLB,, | Performed by: OPHTHALMOLOGY

## 2022-12-19 PROCEDURE — 1126F AMNT PAIN NOTED NONE PRSNT: CPT | Mod: CPTII,S$GLB,, | Performed by: OPHTHALMOLOGY

## 2022-12-19 PROCEDURE — 3288F FALL RISK ASSESSMENT DOCD: CPT | Mod: CPTII,S$GLB,, | Performed by: OPHTHALMOLOGY

## 2022-12-19 PROCEDURE — 3288F PR FALLS RISK ASSESSMENT DOCUMENTED: ICD-10-PCS | Mod: CPTII,S$GLB,, | Performed by: OPHTHALMOLOGY

## 2022-12-19 PROCEDURE — 99999 PR PBB SHADOW E&M-EST. PATIENT-LVL III: ICD-10-PCS | Mod: PBBFAC,,, | Performed by: OPHTHALMOLOGY

## 2022-12-19 PROCEDURE — 1159F MED LIST DOCD IN RCRD: CPT | Mod: CPTII,S$GLB,, | Performed by: OPHTHALMOLOGY

## 2022-12-19 PROCEDURE — 99999 PR PBB SHADOW E&M-EST. PATIENT-LVL III: CPT | Mod: PBBFAC,,, | Performed by: OPHTHALMOLOGY

## 2022-12-19 PROCEDURE — 99214 OFFICE O/P EST MOD 30 MIN: CPT | Mod: S$GLB,,, | Performed by: OPHTHALMOLOGY

## 2022-12-19 PROCEDURE — 1101F PR PT FALLS ASSESS DOC 0-1 FALLS W/OUT INJ PAST YR: ICD-10-PCS | Mod: CPTII,S$GLB,, | Performed by: OPHTHALMOLOGY

## 2022-12-19 PROCEDURE — 99214 PR OFFICE/OUTPT VISIT, EST, LEVL IV, 30-39 MIN: ICD-10-PCS | Mod: S$GLB,,, | Performed by: OPHTHALMOLOGY

## 2022-12-19 RX ORDER — CHLORHEXIDINE GLUCONATE ORAL RINSE 1.2 MG/ML
15 SOLUTION DENTAL 2 TIMES DAILY
COMMUNITY
Start: 2022-09-21 | End: 2023-01-30

## 2022-12-19 RX ORDER — DOXYCYCLINE 100 MG/1
100 CAPSULE ORAL 2 TIMES DAILY
COMMUNITY
Start: 2022-12-05 | End: 2023-01-30

## 2022-12-19 RX ORDER — FLUTICASONE PROPIONATE 50 MCG
SPRAY, SUSPENSION (ML) NASAL
COMMUNITY
Start: 2022-12-07

## 2022-12-19 RX ORDER — BENZONATATE 100 MG/1
100 CAPSULE ORAL
COMMUNITY
Start: 2022-12-05 | End: 2023-01-30

## 2022-12-19 NOTE — PROGRESS NOTES
Assessment /Plan     For exam results, see Encounter Report.    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    Traumatic Glaucoma OD  Congenital Glaucoma  Prosthesis OS    CCT  530    Mid-low teens --> LWIT    Right Eye --> good adherence --> CSM  Cosopt TID  Alphagan TID  Xal q day  FML 5 days / week     Giovani TID --> holding  Beni q day --> not covered    Diamox --> Intol Hypo K --> now on K suppl -->  D 250 BID --> Holding  Tolerated well in past    Right eye MP3 / G6 Laser 2019    --> re-discussed --> G-Probe --> reconsider as discussed  Pre-Tx @ 27, 19, 24      PC IOL OD  Quiet  Observe    Monocular precautions    Prosthesis  Conj bed quiet 2019  fu with       Plan  RTC 4 months IOP & Adherence & OCT RNFL OD  RTC sooner prn with good understanding

## 2023-01-25 ENCOUNTER — LAB VISIT (OUTPATIENT)
Dept: LAB | Facility: HOSPITAL | Age: 73
End: 2023-01-25
Attending: INTERNAL MEDICINE
Payer: MEDICARE

## 2023-01-25 DIAGNOSIS — N18.31 STAGE 3A CHRONIC KIDNEY DISEASE: ICD-10-CM

## 2023-01-25 DIAGNOSIS — E78.00 PURE HYPERCHOLESTEROLEMIA: ICD-10-CM

## 2023-01-25 DIAGNOSIS — I10 ESSENTIAL HYPERTENSION: ICD-10-CM

## 2023-01-25 LAB
ALBUMIN SERPL BCP-MCNC: 4.2 G/DL (ref 3.5–5.2)
ALP SERPL-CCNC: 98 U/L (ref 55–135)
ALT SERPL W/O P-5'-P-CCNC: 31 U/L (ref 10–44)
ANION GAP SERPL CALC-SCNC: 12 MMOL/L (ref 8–16)
AST SERPL-CCNC: 27 U/L (ref 10–40)
BASOPHILS # BLD AUTO: 0.04 K/UL (ref 0–0.2)
BASOPHILS NFR BLD: 0.8 % (ref 0–1.9)
BILIRUB SERPL-MCNC: 0.6 MG/DL (ref 0.1–1)
BUN SERPL-MCNC: 23 MG/DL (ref 8–23)
CALCIUM SERPL-MCNC: 10.3 MG/DL (ref 8.7–10.5)
CHLORIDE SERPL-SCNC: 102 MMOL/L (ref 95–110)
CHOLEST SERPL-MCNC: 184 MG/DL (ref 120–199)
CHOLEST/HDLC SERPL: 2.3 {RATIO} (ref 2–5)
CO2 SERPL-SCNC: 27 MMOL/L (ref 23–29)
CREAT SERPL-MCNC: 1.1 MG/DL (ref 0.5–1.4)
DIFFERENTIAL METHOD: ABNORMAL
EOSINOPHIL # BLD AUTO: 0.3 K/UL (ref 0–0.5)
EOSINOPHIL NFR BLD: 5.9 % (ref 0–8)
ERYTHROCYTE [DISTWIDTH] IN BLOOD BY AUTOMATED COUNT: 16 % (ref 11.5–14.5)
EST. GFR  (NO RACE VARIABLE): 53.7 ML/MIN/1.73 M^2
GLUCOSE SERPL-MCNC: 102 MG/DL (ref 70–110)
HCT VFR BLD AUTO: 37.6 % (ref 37–48.5)
HDLC SERPL-MCNC: 79 MG/DL (ref 40–75)
HDLC SERPL: 42.9 % (ref 20–50)
HGB BLD-MCNC: 11.1 G/DL (ref 12–16)
IMM GRANULOCYTES # BLD AUTO: 0.02 K/UL (ref 0–0.04)
IMM GRANULOCYTES NFR BLD AUTO: 0.4 % (ref 0–0.5)
LDLC SERPL CALC-MCNC: 85.6 MG/DL (ref 63–159)
LYMPHOCYTES # BLD AUTO: 1.8 K/UL (ref 1–4.8)
LYMPHOCYTES NFR BLD: 36.2 % (ref 18–48)
MCH RBC QN AUTO: 21 PG (ref 27–31)
MCHC RBC AUTO-ENTMCNC: 29.5 G/DL (ref 32–36)
MCV RBC AUTO: 71 FL (ref 82–98)
MONOCYTES # BLD AUTO: 0.4 K/UL (ref 0.3–1)
MONOCYTES NFR BLD: 7.7 % (ref 4–15)
NEUTROPHILS # BLD AUTO: 2.5 K/UL (ref 1.8–7.7)
NEUTROPHILS NFR BLD: 49 % (ref 38–73)
NONHDLC SERPL-MCNC: 105 MG/DL
NRBC BLD-RTO: 0 /100 WBC
PHOSPHATE SERPL-MCNC: 3.7 MG/DL (ref 2.7–4.5)
PLATELET # BLD AUTO: 240 K/UL (ref 150–450)
PMV BLD AUTO: 10 FL (ref 9.2–12.9)
POTASSIUM SERPL-SCNC: 3.7 MMOL/L (ref 3.5–5.1)
PROT SERPL-MCNC: 7.8 G/DL (ref 6–8.4)
RBC # BLD AUTO: 5.28 M/UL (ref 4–5.4)
SODIUM SERPL-SCNC: 141 MMOL/L (ref 136–145)
TRIGL SERPL-MCNC: 97 MG/DL (ref 30–150)
WBC # BLD AUTO: 5.05 K/UL (ref 3.9–12.7)

## 2023-01-25 PROCEDURE — 85025 COMPLETE CBC W/AUTO DIFF WBC: CPT | Performed by: INTERNAL MEDICINE

## 2023-01-25 PROCEDURE — 84100 ASSAY OF PHOSPHORUS: CPT | Performed by: INTERNAL MEDICINE

## 2023-01-25 PROCEDURE — 36415 COLL VENOUS BLD VENIPUNCTURE: CPT | Mod: PO | Performed by: INTERNAL MEDICINE

## 2023-01-25 PROCEDURE — 80053 COMPREHEN METABOLIC PANEL: CPT | Performed by: INTERNAL MEDICINE

## 2023-01-25 PROCEDURE — 80061 LIPID PANEL: CPT | Performed by: INTERNAL MEDICINE

## 2023-01-27 NOTE — PROGRESS NOTES
This note was created by combination of typed  and M-Modal dictation.  Transcription errors may be present.  If there are any questions, please contact me.    Assessment and Plan:   Essential hypertension  Stage 3a chronic kidney disease  -blood pressure good.  Pre visit labs stable on current regimen.  No changes.  Stay on atenolol/chlorthalidone, potassium supplement  -     potassium chloride (KLOR-CON) 10 MEQ TbSR; Take 1 tablet (10 mEq total) by mouth once daily.  Dispense: 90 tablet; Refill: 3  -     atenoloL-chlorthalidone (TENORETIC) 50-25 mg Tab; Take 1 tablet by mouth every morning.  Dispense: 90 tablet; Refill: 3  -     Comprehensive Metabolic Panel; Future; Expected date: 07/27/2023  -     CBC Auto Differential; Future; Expected date: 07/27/2023  -     Phosphorus; Future; Expected date: 07/27/2023  -     Vitamin D; Future; Expected date: 07/27/2023  -     PTH, Intact; Future; Expected date: 07/27/2023    Pure hypercholesterolemia  -pre visit labs lipid profile good on low-dose statin no changes  -     atorvastatin (LIPITOR) 10 MG tablet; Take 1 tablet (10 mg total) by mouth once daily.  Dispense: 90 tablet; Refill: 3    Thalassemia, unspecified type  -stable    Osteopenia of multiple sites on DEXa 2015  -discussed need to update DEXA scan, she remains wary as it sounds like she is wary of polypharmacy in the event of worsening to osteoporosis.    Does not take calcium or vitamin-D and encouraged to start taking combo pill 2 pills daily.    Needs flu shot  -     Influenza - Quadrivalent (Adjuvanted)    Postmenopausal with dryness, not using estradiol topical.  She is taking an over-the-counter supplement that does contain black cohosh.  We discussed this that there have been cases of hepatitis/transaminitis with black cohosh and I would not recommended.  If she does continue to take it will need to monitor.  Pre visit LFTs were normal.      Medications Discontinued During This Encounter    Medication Reason    benzonatate (TESSALON) 100 MG capsule     doxycycline (VIBRAMYCIN) 100 MG Cap     chlorhexidine (PERIDEX) 0.12 % solution     estradioL (ESTRACE) 0.01 % (0.1 mg/gram) vaginal cream     atorvastatin (LIPITOR) 10 MG tablet Reorder    atenoloL-chlorthalidone (TENORETIC) 50-25 mg Tab Reorder    potassium chloride (KLOR-CON) 10 MEQ TbSR Reorder       meds sent this encounter:  Medications Ordered This Encounter   Medications    atenoloL-chlorthalidone (TENORETIC) 50-25 mg Tab     Sig: Take 1 tablet by mouth every morning.     Dispense:  90 tablet     Refill:  3     .    atorvastatin (LIPITOR) 10 MG tablet     Sig: Take 1 tablet (10 mg total) by mouth once daily.     Dispense:  90 tablet     Refill:  3    potassium chloride (KLOR-CON) 10 MEQ TbSR     Sig: Take 1 tablet (10 mEq total) by mouth once daily.     Dispense:  90 tablet     Refill:  3       Follow Up: No follow-ups on file.  Follow-up 6 months with labs    Subjective:     Chief Complaint   Patient presents with    Hypertension     Follow up        HPI  Yahaira is a 72 y.o. female.    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 Patient is postmenopausal.    Last appointment with this clinic was Visit date not found. Last visit with me 7/28/2022   To summarize last visit and events leading up to today:  Hypertension, CKD stage IIIA, hypokalemia, potassium low but missing doses.  Hyperlipidemia/statin  Osteopenia.  Needs to update DEXA scan but she was wary at that time.   If bisphosphonate indicated, her creatinine can support it.    Pre visit labs  CBC microcytosis known thalassemia   CMP creatinine 1.1 stable CKD stage IIIA  Lipid profile good non     Today's visit:  Review of meds  Not taking estradiol vaginal.  Using an OTC supplement. Healthy hippie go with the flow.  Looked up ingredients - does have black cohosh.  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    Does find it helpful.     Remains wary of DEXA scan mainly b/c she is wary of medication if she were dx'd  Not taking ca or vit D.  Dairy intake maybe 1 serving dairy.      Patient Care Team:  Raul Adkins MD as PCP - General (Internal Medicine)  Temi Ziegler MD as Consulting Physician (Internal Medicine)  Doroteo Beaver MD as Consulting Physician (Ophthalmology)  Jeremias Ca MD as Consulting Physician (Gastroenterology)  Georgie Ralph MD as Obstetrician (Obstetrics and Gynecology)  Veronica Baker MA as Care Coordinator  [facility] (Gastroenterology)    Patient Active Problem List    Diagnosis Date Noted    Osteopenia of multiple sites on DEXa 2015 07/28/2022    GERD with esophagitis on EGD 8/2015 01/16/2020 08/17/2015 EGD irregular Z-line biopsies of duodenum and stomach and GE junction obtained.  Biopsy showed mild chronic esophagitis.  Mild chronic gastritis.  H pylori negative.  Duodenum normal.      Screening for colon cancer 08/17/2015 colonoscopy diverticulosis entire colon.  Grade 1 internal hemorrhoids. 01/16/2020 08/17/2015 colonoscopy diverticulosis entire colon.  Grade 1 internal hemorrhoids.      Stage 3a chronic kidney disease 12/03/2019    Pure hypercholesterolemia 05/29/2019    Essential hypertension 05/29/2019    Status post cholecystectomy 1998 still gets bile sludge in the bile duct; Dr. 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  Review of patient's allergies indicates:  No Known Allergies    Medication List with Changes/Refills   Current Medications    ALPHAGAN P 0.1 % DROP    INSTILL 1 DROP INTO BOTH EYES THREE TIMES DAILY    ATENOLOL-CHLORTHALIDONE (TENORETIC) 50-25 MG TAB    Take 1 tablet by mouth every morning.    ATORVASTATIN (LIPITOR) 10 MG TABLET    Take 1 tablet (10 mg total) by mouth once daily.    BENZONATATE (TESSALON) 100 MG CAPSULE    Take 100 mg by mouth.    CHLORHEXIDINE (PERIDEX) 0.12 % SOLUTION    15 mLs 2 (two) times daily.    DORZOLAMIDE-TIMOLOL 2-0.5% (COSOPT) 22.3-6.8 MG/ML OPHTHALMIC SOLUTION    INSTILL 1 DROP IN BOTH EYES THREE TIMES DAILY    DOXYCYCLINE (VIBRAMYCIN) 100 MG CAP    Take 100 mg by mouth 2 (two) times daily.    ESTRADIOL (ESTRACE) 0.01 % (0.1 MG/GRAM) VAGINAL CREAM    PLACE 2 GRAMS VAGINALLY 2 TIMES A WK    FLUOROMETHOLONE 0.1% (FML) 0.1 % DRPS    SHAKE LIQUID AND INSTILL 1 DROP IN BOTH EYES EVERY DAY    FLUTICASONE PROPIONATE (FLONASE) 50 MCG/ACTUATION NASAL SPRAY    by Each Nostril route.    LATANOPROST 0.005 % OPHTHALMIC SOLUTION    INSTILL 1 DROP IN BOTH EYES EVERY EVENING    LUTEIN ORAL    Take 1 tablet by mouth every morning.    POTASSIUM CHLORIDE (KLOR-CON) 10 MEQ TBSR    Take 1 tablet (10 mEq total) by mouth once daily.    VITAMIN E 400 UNIT CAPSULE    Take 400 Units by mouth every morning.        Objective:   Physical Exam   Vitals:    01/30/23 0758   BP: 130/62   BP Location: Left arm   Patient Position:  "Sitting   BP Method: Large (Manual)   Pulse: 71   Temp: 98.6 °F (37 °C)   TempSrc: Oral   SpO2: 95%   Weight: 65.1 kg (143 lb 8.3 oz)   Height: 5' 2" (1.575 m)    Body mass index is 26.25 kg/m².  Weight: 65.1 kg (143 lb 8.3 oz)   Height: 5' 2" (157.5 cm)     Physical Exam  Constitutional:       General: She is not in acute distress.     Appearance: She is well-developed.   Eyes:      Comments: L eye globe prosthesis   Cardiovascular:      Rate and Rhythm: Normal rate and regular rhythm.      Heart sounds: Normal heart sounds. No murmur heard.  Pulmonary:      Effort: Pulmonary effort is normal.      Breath sounds: Normal breath sounds.   Musculoskeletal:         General: Normal range of motion.      Right lower leg: No edema.      Left lower leg: No edema.   Skin:     General: Skin is warm and dry.   Neurological:      Mental Status: She is alert and oriented to person, place, and time.      Coordination: Coordination normal.   Psychiatric:         Behavior: Behavior normal.         Thought Content: Thought content normal.       Component      Latest Ref Rng & Units 1/25/2023 7/25/2022 1/26/2022   WBC      3.90 - 12.70 K/uL 5.05 4.97 4.44   RBC      4.00 - 5.40 M/uL 5.28 5.25 5.05   Hemoglobin      12.0 - 16.0 g/dL 11.1 (L) 11.0 (L) 10.6 (L)   Hematocrit      37.0 - 48.5 % 37.6 37.4 36.0 (L)   MCV      82 - 98 fL 71 (L) 71 (L) 71 (L)   MCH      27.0 - 31.0 pg 21.0 (L) 21.0 (L) 21.0 (L)   MCHC      32.0 - 36.0 g/dL 29.5 (L) 29.4 (L) 29.4 (L)   RDW      11.5 - 14.5 % 16.0 (H) 15.8 (H) 15.6 (H)   Platelets      150 - 450 K/uL 240 230 206   MPV      9.2 - 12.9 fL 10.0 10.3 10.0   Immature Granulocytes      0.0 - 0.5 % 0.4 0.4 0.5   Gran # (ANC)      1.8 - 7.7 K/uL 2.5 2.7 1.9   Immature Grans (Abs)      0.00 - 0.04 K/uL 0.02 0.02 0.02   Lymph #      1.0 - 4.8 K/uL 1.8 1.7 1.8   Mono #      0.3 - 1.0 K/uL 0.4 0.4 0.4   Eos #      0.0 - 0.5 K/uL 0.3 0.1 0.2   Baso #      0.00 - 0.20 K/uL 0.04 0.04 0.05   nRBC      0 /100 " WBC 0 0 0   Gran %      38.0 - 73.0 % 49.0 54.0 43.2   Lymph %      18.0 - 48.0 % 36.2 34.6 41.2   Mono %      4.0 - 15.0 % 7.7 7.6 9.0   Eosinophil %      0.0 - 8.0 % 5.9 2.6 5.0   Basophil %      0.0 - 1.9 % 0.8 0.8 1.1   Differential Method       Automated Automated Automated   Sodium      136 - 145 mmol/L 141 138 143   Potassium      3.5 - 5.1 mmol/L 3.7 3.4 (L) 4.1   Chloride      95 - 110 mmol/L 102 100 105   CO2      23 - 29 mmol/L 27 28 30 (H)   Glucose      70 - 110 mg/dL 102 103 95   BUN      8 - 23 mg/dL 23 22 18   Creatinine      0.5 - 1.4 mg/dL 1.1 1.1 1.0   Calcium      8.7 - 10.5 mg/dL 10.3 10.1 10.3   PROTEIN TOTAL      6.0 - 8.4 g/dL 7.8 7.7 7.3   Albumin      3.5 - 5.2 g/dL 4.2 4.2 4.0   BILIRUBIN TOTAL      0.1 - 1.0 mg/dL 0.6 0.8 0.7   Alkaline Phosphatase      55 - 135 U/L 98 88 84   AST      10 - 40 U/L 27 30 25   ALT      10 - 44 U/L 31 38 24   Anion Gap      8 - 16 mmol/L 12 10 8   eGFR if African American      >60 mL/min/1.73 m:2  58.8 (A) >60.0   eGFR if non African American      >60 mL/min/1.73 m:2  51.0 (A) 57.2 (A)   eGFR      >60 mL/min/1.73 m:2 53.7 (A)     Cholesterol      120 - 199 mg/dL 184  200 (H)   Triglycerides      30 - 150 mg/dL 97  69   HDL      40 - 75 mg/dL 79 (H)  89 (H)   LDL Cholesterol External      63.0 - 159.0 mg/dL 85.6  97.2   HDL/Cholesterol Ratio      20.0 - 50.0 % 42.9  44.5   Total Cholesterol/HDL Ratio      2.0 - 5.0 2.3  2.2   Non-HDL Cholesterol      mg/dL 105  111   Phosphorus      2.7 - 4.5 mg/dL 3.7 3.1

## 2023-01-30 ENCOUNTER — OFFICE VISIT (OUTPATIENT)
Dept: FAMILY MEDICINE | Facility: CLINIC | Age: 73
End: 2023-01-30
Payer: MEDICARE

## 2023-01-30 VITALS
BODY MASS INDEX: 26.41 KG/M2 | SYSTOLIC BLOOD PRESSURE: 130 MMHG | WEIGHT: 143.5 LBS | OXYGEN SATURATION: 95 % | TEMPERATURE: 99 F | DIASTOLIC BLOOD PRESSURE: 62 MMHG | HEART RATE: 71 BPM | HEIGHT: 62 IN

## 2023-01-30 DIAGNOSIS — D56.9 THALASSEMIA, UNSPECIFIED TYPE: ICD-10-CM

## 2023-01-30 DIAGNOSIS — Z23 NEEDS FLU SHOT: ICD-10-CM

## 2023-01-30 DIAGNOSIS — I10 ESSENTIAL HYPERTENSION: Primary | ICD-10-CM

## 2023-01-30 DIAGNOSIS — N18.31 STAGE 3A CHRONIC KIDNEY DISEASE: ICD-10-CM

## 2023-01-30 DIAGNOSIS — E78.00 PURE HYPERCHOLESTEROLEMIA: ICD-10-CM

## 2023-01-30 DIAGNOSIS — M85.89 OSTEOPENIA OF MULTIPLE SITES: ICD-10-CM

## 2023-01-30 PROCEDURE — 90694 VACC AIIV4 NO PRSRV 0.5ML IM: CPT | Mod: S$GLB,,, | Performed by: INTERNAL MEDICINE

## 2023-01-30 PROCEDURE — 99214 PR OFFICE/OUTPT VISIT, EST, LEVL IV, 30-39 MIN: ICD-10-PCS | Mod: 25,S$GLB,, | Performed by: INTERNAL MEDICINE

## 2023-01-30 PROCEDURE — 3288F FALL RISK ASSESSMENT DOCD: CPT | Mod: CPTII,S$GLB,, | Performed by: INTERNAL MEDICINE

## 2023-01-30 PROCEDURE — 99999 PR PBB SHADOW E&M-EST. PATIENT-LVL IV: CPT | Mod: PBBFAC,,, | Performed by: INTERNAL MEDICINE

## 2023-01-30 PROCEDURE — 99499 RISK ADDL DX/OHS AUDIT: ICD-10-PCS | Mod: S$GLB,,, | Performed by: INTERNAL MEDICINE

## 2023-01-30 PROCEDURE — 3078F DIAST BP <80 MM HG: CPT | Mod: CPTII,S$GLB,, | Performed by: INTERNAL MEDICINE

## 2023-01-30 PROCEDURE — 3288F PR FALLS RISK ASSESSMENT DOCUMENTED: ICD-10-PCS | Mod: CPTII,S$GLB,, | Performed by: INTERNAL MEDICINE

## 2023-01-30 PROCEDURE — 1160F RVW MEDS BY RX/DR IN RCRD: CPT | Mod: CPTII,S$GLB,, | Performed by: INTERNAL MEDICINE

## 2023-01-30 PROCEDURE — 99214 OFFICE O/P EST MOD 30 MIN: CPT | Mod: 25,S$GLB,, | Performed by: INTERNAL MEDICINE

## 2023-01-30 PROCEDURE — 1101F PT FALLS ASSESS-DOCD LE1/YR: CPT | Mod: CPTII,S$GLB,, | Performed by: INTERNAL MEDICINE

## 2023-01-30 PROCEDURE — 1101F PR PT FALLS ASSESS DOC 0-1 FALLS W/OUT INJ PAST YR: ICD-10-PCS | Mod: CPTII,S$GLB,, | Performed by: INTERNAL MEDICINE

## 2023-01-30 PROCEDURE — 1126F PR PAIN SEVERITY QUANTIFIED, NO PAIN PRESENT: ICD-10-PCS | Mod: CPTII,S$GLB,, | Performed by: INTERNAL MEDICINE

## 2023-01-30 PROCEDURE — 3075F SYST BP GE 130 - 139MM HG: CPT | Mod: CPTII,S$GLB,, | Performed by: INTERNAL MEDICINE

## 2023-01-30 PROCEDURE — 99999 PR PBB SHADOW E&M-EST. PATIENT-LVL IV: ICD-10-PCS | Mod: PBBFAC,,, | Performed by: INTERNAL MEDICINE

## 2023-01-30 PROCEDURE — 3008F BODY MASS INDEX DOCD: CPT | Mod: CPTII,S$GLB,, | Performed by: INTERNAL MEDICINE

## 2023-01-30 PROCEDURE — 99499 UNLISTED E&M SERVICE: CPT | Mod: S$GLB,,, | Performed by: INTERNAL MEDICINE

## 2023-01-30 PROCEDURE — G0008 ADMIN INFLUENZA VIRUS VAC: HCPCS | Mod: S$GLB,,, | Performed by: INTERNAL MEDICINE

## 2023-01-30 PROCEDURE — G0008 FLU VACCINE - QUADRIVALENT - ADJUVANTED: ICD-10-PCS | Mod: S$GLB,,, | Performed by: INTERNAL MEDICINE

## 2023-01-30 PROCEDURE — 1160F PR REVIEW ALL MEDS BY PRESCRIBER/CLIN PHARMACIST DOCUMENTED: ICD-10-PCS | Mod: CPTII,S$GLB,, | Performed by: INTERNAL MEDICINE

## 2023-01-30 PROCEDURE — 1159F PR MEDICATION LIST DOCUMENTED IN MEDICAL RECORD: ICD-10-PCS | Mod: CPTII,S$GLB,, | Performed by: INTERNAL MEDICINE

## 2023-01-30 PROCEDURE — 1159F MED LIST DOCD IN RCRD: CPT | Mod: CPTII,S$GLB,, | Performed by: INTERNAL MEDICINE

## 2023-01-30 PROCEDURE — 3008F PR BODY MASS INDEX (BMI) DOCUMENTED: ICD-10-PCS | Mod: CPTII,S$GLB,, | Performed by: INTERNAL MEDICINE

## 2023-01-30 PROCEDURE — 1126F AMNT PAIN NOTED NONE PRSNT: CPT | Mod: CPTII,S$GLB,, | Performed by: INTERNAL MEDICINE

## 2023-01-30 PROCEDURE — 90694 FLU VACCINE - QUADRIVALENT - ADJUVANTED: ICD-10-PCS | Mod: S$GLB,,, | Performed by: INTERNAL MEDICINE

## 2023-01-30 PROCEDURE — 3078F PR MOST RECENT DIASTOLIC BLOOD PRESSURE < 80 MM HG: ICD-10-PCS | Mod: CPTII,S$GLB,, | Performed by: INTERNAL MEDICINE

## 2023-01-30 PROCEDURE — 3075F PR MOST RECENT SYSTOLIC BLOOD PRESS GE 130-139MM HG: ICD-10-PCS | Mod: CPTII,S$GLB,, | Performed by: INTERNAL MEDICINE

## 2023-01-30 RX ORDER — ATENOLOL AND CHLORTHALIDONE TABLET 50; 25 MG/1; MG/1
1 TABLET ORAL EVERY MORNING
Qty: 90 TABLET | Refills: 3 | Status: SHIPPED | OUTPATIENT
Start: 2023-01-30 | End: 2024-02-01 | Stop reason: SDUPTHER

## 2023-01-30 RX ORDER — POTASSIUM CHLORIDE 750 MG/1
10 TABLET, EXTENDED RELEASE ORAL DAILY
Qty: 90 TABLET | Refills: 3 | Status: SHIPPED | OUTPATIENT
Start: 2023-01-30 | End: 2024-02-01 | Stop reason: SDUPTHER

## 2023-01-30 RX ORDER — ATORVASTATIN CALCIUM 10 MG/1
10 TABLET, FILM COATED ORAL DAILY
Qty: 90 TABLET | Refills: 3 | Status: SHIPPED | OUTPATIENT
Start: 2023-01-30 | End: 2023-03-03

## 2023-02-16 ENCOUNTER — TELEPHONE (OUTPATIENT)
Dept: OPHTHALMOLOGY | Facility: CLINIC | Age: 73
End: 2023-02-16
Payer: MEDICARE

## 2023-02-16 NOTE — TELEPHONE ENCOUNTER
Contacted pt- scheduled appointment with Dr. Beaver.    ----- Message from Rishabh Clarke sent at 2/16/2023 10:38 AM CST -----  Consult/Advisory    Name Of Caller:Derek      Contact Preference?: [phone]      Nature of Call? Pt called regarding a appt fu in April. No appt are showing. Please call pt to further assist.

## 2023-03-27 ENCOUNTER — PES CALL (OUTPATIENT)
Dept: ADMINISTRATIVE | Facility: CLINIC | Age: 73
End: 2023-03-27
Payer: MEDICARE

## 2023-04-17 ENCOUNTER — OFFICE VISIT (OUTPATIENT)
Dept: OPHTHALMOLOGY | Facility: CLINIC | Age: 73
End: 2023-04-17
Payer: MEDICARE

## 2023-04-17 DIAGNOSIS — Z98.41 STATUS POST CATARACT EXTRACTION AND INSERTION OF INTRAOCULAR LENS OF RIGHT EYE: ICD-10-CM

## 2023-04-17 DIAGNOSIS — Z97.0 EYE GLOBE PROSTHESIS: ICD-10-CM

## 2023-04-17 DIAGNOSIS — H40.31X3 GLAUCOMA OF RIGHT EYE ASSOCIATED WITH OCULAR TRAUMA, SEVERE STAGE: ICD-10-CM

## 2023-04-17 DIAGNOSIS — Z94.7 TRANSPLANTED CORNEA: ICD-10-CM

## 2023-04-17 DIAGNOSIS — Z96.1 STATUS POST CATARACT EXTRACTION AND INSERTION OF INTRAOCULAR LENS OF RIGHT EYE: ICD-10-CM

## 2023-04-17 DIAGNOSIS — Q15.0 CONGENITAL GLAUCOMA OF BOTH EYES: Primary | ICD-10-CM

## 2023-04-17 PROCEDURE — 99999 PR PBB SHADOW E&M-EST. PATIENT-LVL II: ICD-10-PCS | Mod: PBBFAC,,, | Performed by: OPHTHALMOLOGY

## 2023-04-17 PROCEDURE — 1126F PR PAIN SEVERITY QUANTIFIED, NO PAIN PRESENT: ICD-10-PCS | Mod: CPTII,S$GLB,, | Performed by: OPHTHALMOLOGY

## 2023-04-17 PROCEDURE — 1160F RVW MEDS BY RX/DR IN RCRD: CPT | Mod: CPTII,S$GLB,, | Performed by: OPHTHALMOLOGY

## 2023-04-17 PROCEDURE — 1160F PR REVIEW ALL MEDS BY PRESCRIBER/CLIN PHARMACIST DOCUMENTED: ICD-10-PCS | Mod: CPTII,S$GLB,, | Performed by: OPHTHALMOLOGY

## 2023-04-17 PROCEDURE — 1159F MED LIST DOCD IN RCRD: CPT | Mod: CPTII,S$GLB,, | Performed by: OPHTHALMOLOGY

## 2023-04-17 PROCEDURE — 92133 CPTRZD OPH DX IMG PST SGM ON: CPT | Mod: S$GLB,,, | Performed by: OPHTHALMOLOGY

## 2023-04-17 PROCEDURE — 1101F PT FALLS ASSESS-DOCD LE1/YR: CPT | Mod: CPTII,S$GLB,, | Performed by: OPHTHALMOLOGY

## 2023-04-17 PROCEDURE — 3288F FALL RISK ASSESSMENT DOCD: CPT | Mod: CPTII,S$GLB,, | Performed by: OPHTHALMOLOGY

## 2023-04-17 PROCEDURE — 99214 PR OFFICE/OUTPT VISIT, EST, LEVL IV, 30-39 MIN: ICD-10-PCS | Mod: S$GLB,,, | Performed by: OPHTHALMOLOGY

## 2023-04-17 PROCEDURE — 1126F AMNT PAIN NOTED NONE PRSNT: CPT | Mod: CPTII,S$GLB,, | Performed by: OPHTHALMOLOGY

## 2023-04-17 PROCEDURE — 92133 POSTERIOR SEGMENT OCT OPTIC NERVE(OCULAR COHERENCE TOMOGRAPHY) - OU - BOTH EYES: ICD-10-PCS | Mod: S$GLB,,, | Performed by: OPHTHALMOLOGY

## 2023-04-17 PROCEDURE — 3288F PR FALLS RISK ASSESSMENT DOCUMENTED: ICD-10-PCS | Mod: CPTII,S$GLB,, | Performed by: OPHTHALMOLOGY

## 2023-04-17 PROCEDURE — 99214 OFFICE O/P EST MOD 30 MIN: CPT | Mod: S$GLB,,, | Performed by: OPHTHALMOLOGY

## 2023-04-17 PROCEDURE — 1159F PR MEDICATION LIST DOCUMENTED IN MEDICAL RECORD: ICD-10-PCS | Mod: CPTII,S$GLB,, | Performed by: OPHTHALMOLOGY

## 2023-04-17 PROCEDURE — 1101F PR PT FALLS ASSESS DOC 0-1 FALLS W/OUT INJ PAST YR: ICD-10-PCS | Mod: CPTII,S$GLB,, | Performed by: OPHTHALMOLOGY

## 2023-04-17 PROCEDURE — 99999 PR PBB SHADOW E&M-EST. PATIENT-LVL II: CPT | Mod: PBBFAC,,, | Performed by: OPHTHALMOLOGY

## 2023-04-17 NOTE — PROGRESS NOTES
Assessment /Plan     For exam results, see Encounter Report.    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    Traumatic Glaucoma OD  Congenital Glaucoma  Prosthesis OS    OCT RNFL OD ==> unable to obtain Valid signal    CCT  530    Mid-low teens --> achieved    Right Eye --> good adherence & Tolerating well --> CSM  Cosopt TID  Alphagan TID  Xal q day  FML 5 days / week     Giovani TID --> holding  Beni q day --> not covered    Diamox 250 BID --> Intol Hypo K --> now on K suppl -->  Holding  Tolerated well in past    Right eye MP3 / G6 Laser 2019     --> G-Probe --> reconsider  Pre-Tx @ 27, 19, 24      PC IOL OD  Quiet  Observe    Monocular precautions    Prosthesis  Conj bed quiet 2019  fu with       Plan  RTC 4 months IOP & Adherence & DFE  RTC sooner prn with good understanding

## 2023-05-24 ENCOUNTER — PES CALL (OUTPATIENT)
Dept: ADMINISTRATIVE | Facility: CLINIC | Age: 73
End: 2023-05-24
Payer: MEDICARE

## 2023-06-30 ENCOUNTER — PES CALL (OUTPATIENT)
Dept: ADMINISTRATIVE | Facility: CLINIC | Age: 73
End: 2023-06-30
Payer: MEDICARE

## 2023-07-24 ENCOUNTER — LAB VISIT (OUTPATIENT)
Dept: LAB | Facility: HOSPITAL | Age: 73
End: 2023-07-24
Attending: INTERNAL MEDICINE
Payer: MEDICARE

## 2023-07-24 DIAGNOSIS — N18.31 STAGE 3A CHRONIC KIDNEY DISEASE: ICD-10-CM

## 2023-07-24 LAB
25(OH)D3+25(OH)D2 SERPL-MCNC: 34 NG/ML (ref 30–96)
ALBUMIN SERPL BCP-MCNC: 4.1 G/DL (ref 3.5–5.2)
ALP SERPL-CCNC: 116 U/L (ref 55–135)
ALT SERPL W/O P-5'-P-CCNC: 45 U/L (ref 10–44)
ANION GAP SERPL CALC-SCNC: 11 MMOL/L (ref 8–16)
AST SERPL-CCNC: 33 U/L (ref 10–40)
BASOPHILS # BLD AUTO: 0.06 K/UL (ref 0–0.2)
BASOPHILS NFR BLD: 1.1 % (ref 0–1.9)
BILIRUB SERPL-MCNC: 0.7 MG/DL (ref 0.1–1)
BUN SERPL-MCNC: 20 MG/DL (ref 8–23)
CALCIUM SERPL-MCNC: 10.3 MG/DL (ref 8.7–10.5)
CHLORIDE SERPL-SCNC: 104 MMOL/L (ref 95–110)
CO2 SERPL-SCNC: 28 MMOL/L (ref 23–29)
CREAT SERPL-MCNC: 1 MG/DL (ref 0.5–1.4)
DIFFERENTIAL METHOD: ABNORMAL
EOSINOPHIL # BLD AUTO: 0.2 K/UL (ref 0–0.5)
EOSINOPHIL NFR BLD: 4.6 % (ref 0–8)
ERYTHROCYTE [DISTWIDTH] IN BLOOD BY AUTOMATED COUNT: 16 % (ref 11.5–14.5)
EST. GFR  (NO RACE VARIABLE): >60 ML/MIN/1.73 M^2
GLUCOSE SERPL-MCNC: 100 MG/DL (ref 70–110)
HCT VFR BLD AUTO: 37.6 % (ref 37–48.5)
HGB BLD-MCNC: 10.9 G/DL (ref 12–16)
IMM GRANULOCYTES # BLD AUTO: 0.04 K/UL (ref 0–0.04)
IMM GRANULOCYTES NFR BLD AUTO: 0.8 % (ref 0–0.5)
LYMPHOCYTES # BLD AUTO: 1.5 K/UL (ref 1–4.8)
LYMPHOCYTES NFR BLD: 28.3 % (ref 18–48)
MCH RBC QN AUTO: 20.7 PG (ref 27–31)
MCHC RBC AUTO-ENTMCNC: 29 G/DL (ref 32–36)
MCV RBC AUTO: 71 FL (ref 82–98)
MONOCYTES # BLD AUTO: 0.4 K/UL (ref 0.3–1)
MONOCYTES NFR BLD: 7.1 % (ref 4–15)
NEUTROPHILS # BLD AUTO: 3 K/UL (ref 1.8–7.7)
NEUTROPHILS NFR BLD: 58.1 % (ref 38–73)
NRBC BLD-RTO: 0 /100 WBC
PHOSPHATE SERPL-MCNC: 3.3 MG/DL (ref 2.7–4.5)
PLATELET # BLD AUTO: 221 K/UL (ref 150–450)
PMV BLD AUTO: 10 FL (ref 9.2–12.9)
POTASSIUM SERPL-SCNC: 3.9 MMOL/L (ref 3.5–5.1)
PROT SERPL-MCNC: 7.5 G/DL (ref 6–8.4)
PTH-INTACT SERPL-MCNC: 66.5 PG/ML (ref 9–77)
RBC # BLD AUTO: 5.27 M/UL (ref 4–5.4)
SODIUM SERPL-SCNC: 143 MMOL/L (ref 136–145)
WBC # BLD AUTO: 5.23 K/UL (ref 3.9–12.7)

## 2023-07-24 PROCEDURE — 80053 COMPREHEN METABOLIC PANEL: CPT | Performed by: INTERNAL MEDICINE

## 2023-07-24 PROCEDURE — 84100 ASSAY OF PHOSPHORUS: CPT | Performed by: INTERNAL MEDICINE

## 2023-07-24 PROCEDURE — 83970 ASSAY OF PARATHORMONE: CPT | Performed by: INTERNAL MEDICINE

## 2023-07-24 PROCEDURE — 85025 COMPLETE CBC W/AUTO DIFF WBC: CPT | Performed by: INTERNAL MEDICINE

## 2023-07-24 PROCEDURE — 82306 VITAMIN D 25 HYDROXY: CPT | Performed by: INTERNAL MEDICINE

## 2023-07-24 PROCEDURE — 36415 COLL VENOUS BLD VENIPUNCTURE: CPT | Mod: PO | Performed by: INTERNAL MEDICINE

## 2023-07-31 NOTE — PROGRESS NOTES
This note was created by combination of typed  and M-Modal dictation.  Transcription errors may be present.  If there are any questions, please contact me.    Assessment and Plan:   Assessment and Plan    Essential hypertension  Stage 3a chronic kidney disease  -BP stable. Pre visit creatinine WNL. PTH, vit D WNL not on supplement.  -     Renal Function Panel; Future; Expected date: 01/31/2024  -     Hepatic Function Panel; Future; Expected date: 01/31/2024  -     Ferritin; Future; Expected date: 01/31/2024  -     Iron and TIBC; Future; Expected date: 01/31/2024  -     Vitamin D; Future; Expected date: 01/31/2024  -     PTH, Intact; Future; Expected date: 01/31/2024  -     CBC Without Differential; Future; Expected date: 01/31/2024    Pure hypercholesterolemia  -future labs on statin.  -     Lipid Panel; Future; Expected date: 01/31/2024    Thalassemia, unspecified type  -stable.    Encounter for screening mammogram for malignant neoplasm of breast  -     Mammo Digital Screening Bilat w/ Kali; Future; Expected date: 08/01/2023       Medications Discontinued During This Encounter   Medication Reason    vitamin E 400 UNIT capsule        meds sent this encounter:         Follow Up:   Future Appointments   Date Time Provider Department Center   8/1/2023 10:20 AM Raul Adkins MD [facility]   8/21/2023  9:30 AM Doroteo Beaver MD [facility]         Subjective:   Subjective   Chief Complaint   Patient presents with    Hypertension       HPI  Yahaira is a 72 y.o. female.    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 Patient is postmenopausal.    Last appointment with this clinic was Visit date not found. Last visit with me 1/30/2023   To summarize last visit and events leading up to today:  Hypertension, CKD stage IIIA, hypokalemia, potassium low but missing doses.  Hyperlipidemia/statin  Osteopenia.  Needs to update DEXA scan but she was wary at that time b/c of concerns about polypharmacy if dx'd with osteoporosis.  If bisphosphonate indicated, her creatinine can support it. Ca and vit D supplement.   Post menopause with dryness, not taking topical estrogen.  Using OTC black cohosh, advised to stop    Pre visit labs  CBC stable  CMP WNL Cr 1.0 from 1.1  Vit D low normal  PTH WNL        Today's visit:  Please note: Chronic medical problems that are stable but are being addressed at this visit may not be listed/documented here, but may be addressed in more detail in the Assessment and Plan section.   Below are salient features of chronic medical conditions, or new/acute conditions and their details.    Not taking black cohosh  No more symptoms of post menopause  Not taking estrogen still    Taking vit E - recalls advice from GI in the past.  Not for eye.  Advised to stop    Declines vaccinations    No issues otherwise.    Patient Care Team:  Raul Adkins MD as PCP - General (Internal Medicine)  Temi Ziegler MD as Consulting Physician (Internal Medicine)  Doroteo Beaver MD as Consulting Physician (Ophthalmology)  Jeremias Ca MD as Consulting Physician (Gastroenterology)  Georgie Ralph MD as Obstetrician (Obstetrics and Gynecology)  Veronica Baker MA as Care Coordinator  [facility] (Gastroenterology)      Patient Active Problem List    Diagnosis Date Noted    Osteopenia of multiple sites  on DEXa 2015 07/28/2022    GERD with esophagitis on EGD 8/2015 01/16/2020 08/17/2015 EGD irregular Z-line biopsies of duodenum and stomach and GE junction obtained.  Biopsy showed mild chronic esophagitis.  Mild chronic gastritis.  H pylori negative.  Duodenum normal.      Screening for colon cancer 08/17/2015 colonoscopy diverticulosis entire colon.  Grade 1 internal hemorrhoids. 01/16/2020 08/17/2015 colonoscopy diverticulosis entire colon.  Grade 1 internal hemorrhoids.      Stage 3a chronic kidney disease 12/03/2019    Pure hypercholesterolemia 05/29/2019    Essential hypertension 05/29/2019    Status post cholecystectomy 1998 still gets bile sludge in the bile duct; Dr. 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  Medication List with Changes/Refills   Current Medications    ALPHAGAN P 0.1 % DROP    INSTILL ONE DROP BOTH EYES THREE TIMES DAILY    ATENOLOL-CHLORTHALIDONE (TENORETIC) 50-25 MG TAB    Take 1 tablet by mouth every morning.    ATORVASTATIN (LIPITOR) 10 MG TABLET    TAKE 1 TABLET(10 MG) BY MOUTH EVERY DAY    DORZOLAMIDE-TIMOLOL 2-0.5% (COSOPT) 22.3-6.8 MG/ML OPHTHALMIC SOLUTION    INSTILL 1 DROP IN BOTH EYES THREE TIMES DAILY    FLUOROMETHOLONE 0.1% (FML) 0.1 % DRPS    SHAKE LIQUID AND INSTILL 1 DROP IN BOTH EYES EVERY DAY    FLUTICASONE PROPIONATE (FLONASE) 50 MCG/ACTUATION NASAL SPRAY    by Each Nostril route.    LATANOPROST 0.005 % OPHTHALMIC SOLUTION     "INSTILL 1 DROP IN BOTH EYES EVERY EVENING    LUTEIN ORAL    Take 1 tablet by mouth every morning.    POTASSIUM CHLORIDE (KLOR-CON) 10 MEQ TBSR    Take 1 tablet (10 mEq total) by mouth once daily.    VITAMIN E 400 UNIT CAPSULE    Take 400 Units by mouth every morning.         Objective:   Objective   Physical Exam   Vitals:    08/01/23 0846   BP: 126/70   Pulse: 64   Temp: 97.9 °F (36.6 °C)   TempSrc: Oral   SpO2: 95%   Weight: 67 kg (147 lb 9.6 oz)   Height: 5' 2" (1.575 m)    Body mass index is 27 kg/m².            Physical Exam  Constitutional:       Appearance: She is well-developed.   Eyes:      General: No scleral icterus.     Comments: Left eye prosthesis   Neck:      Thyroid: No thyromegaly.   Cardiovascular:      Rate and Rhythm: Normal rate and regular rhythm.      Heart sounds: Normal heart sounds. No murmur heard.  Pulmonary:      Effort: Pulmonary effort is normal.      Breath sounds: Normal breath sounds. No wheezing.   Abdominal:      Palpations: Abdomen is soft. There is no hepatomegaly, splenomegaly or mass.      Tenderness: There is no abdominal tenderness.   Musculoskeletal:         General: No deformity. Normal range of motion.      Cervical back: Neck supple.   Lymphadenopathy:      Cervical: No cervical adenopathy.   Skin:     General: Skin is warm and dry.      Findings: No rash.      Comments: On exposed skin   Neurological:      Mental Status: She is alert and oriented to person, place, and time.      Deep Tendon Reflexes: Reflexes are normal and symmetric.   Psychiatric:         Behavior: Behavior normal.         Thought Content: Thought content normal.         Judgment: Judgment normal.         Component      Latest Ref Rng & Units 7/24/2023 1/25/2023   WBC      3.90 - 12.70 K/uL 5.23 5.05   RBC      4.00 - 5.40 M/uL 5.27 5.28   Hemoglobin      12.0 - 16.0 g/dL 10.9 (L) 11.1 (L)   Hematocrit      37.0 - 48.5 % 37.6 37.6   MCV      82 - 98 fL 71 (L) 71 (L)   MCH      27.0 - 31.0 pg 20.7 (L) " 21.0 (L)   MCHC      32.0 - 36.0 g/dL 29.0 (L) 29.5 (L)   RDW      11.5 - 14.5 % 16.0 (H) 16.0 (H)   Platelets      150 - 450 K/uL 221 240   MPV      9.2 - 12.9 fL 10.0 10.0   Immature Granulocytes      0.0 - 0.5 % 0.8 (H) 0.4   Gran # (ANC)      1.8 - 7.7 K/uL 3.0 2.5   Immature Grans (Abs)      0.00 - 0.04 K/uL 0.04 0.02   Lymph #      1.0 - 4.8 K/uL 1.5 1.8   Mono #      0.3 - 1.0 K/uL 0.4 0.4   Eos #      0.0 - 0.5 K/uL 0.2 0.3   Baso #      0.00 - 0.20 K/uL 0.06 0.04   nRBC      0 /100 WBC 0 0   Gran %      38.0 - 73.0 % 58.1 49.0   Lymph %      18.0 - 48.0 % 28.3 36.2   Mono %      4.0 - 15.0 % 7.1 7.7   Eosinophil %      0.0 - 8.0 % 4.6 5.9   Basophil %      0.0 - 1.9 % 1.1 0.8   Differential Method       Automated Automated   Sodium      136 - 145 mmol/L 143 141   Potassium      3.5 - 5.1 mmol/L 3.9 3.7   Chloride      95 - 110 mmol/L 104 102   CO2      23 - 29 mmol/L 28 27   Glucose      70 - 110 mg/dL 100 102   BUN      8 - 23 mg/dL 20 23   Creatinine      0.5 - 1.4 mg/dL 1.0 1.1   Calcium      8.7 - 10.5 mg/dL 10.3 10.3   PROTEIN TOTAL      6.0 - 8.4 g/dL 7.5 7.8   Albumin      3.5 - 5.2 g/dL 4.1 4.2   BILIRUBIN TOTAL      0.1 - 1.0 mg/dL 0.7 0.6   Alkaline Phosphatase      55 - 135 U/L 116 98   AST      10 - 40 U/L 33 27   ALT      10 - 44 U/L 45 (H) 31   Anion Gap      8 - 16 mmol/L 11 12   eGFR      >60 mL/min/1.73 m:2 >60.0 53.7 (A)   Cholesterol      120 - 199 mg/dL  184   Triglycerides      30 - 150 mg/dL  97   HDL      40 - 75 mg/dL  79 (H)   LDL Cholesterol External      63.0 - 159.0 mg/dL  85.6   HDL/Cholesterol Ratio      20.0 - 50.0 %  42.9   Total Cholesterol/HDL Ratio      2.0 - 5.0  2.3   Non-HDL Cholesterol      mg/dL  105   Phosphorus      2.7 - 4.5 mg/dL 3.3 3.7   Vit D, 25-Hydroxy      30 - 96 ng/mL 34    PTH      9.0 - 77.0 pg/mL 66.5

## 2023-08-01 ENCOUNTER — OFFICE VISIT (OUTPATIENT)
Dept: FAMILY MEDICINE | Facility: CLINIC | Age: 73
End: 2023-08-01
Payer: MEDICARE

## 2023-08-01 VITALS
DIASTOLIC BLOOD PRESSURE: 70 MMHG | OXYGEN SATURATION: 95 % | HEIGHT: 62 IN | HEART RATE: 64 BPM | BODY MASS INDEX: 27.17 KG/M2 | TEMPERATURE: 98 F | WEIGHT: 147.63 LBS | SYSTOLIC BLOOD PRESSURE: 126 MMHG

## 2023-08-01 DIAGNOSIS — D56.9 THALASSEMIA, UNSPECIFIED TYPE: ICD-10-CM

## 2023-08-01 DIAGNOSIS — I10 ESSENTIAL HYPERTENSION: Primary | ICD-10-CM

## 2023-08-01 DIAGNOSIS — Z12.31 ENCOUNTER FOR SCREENING MAMMOGRAM FOR MALIGNANT NEOPLASM OF BREAST: ICD-10-CM

## 2023-08-01 DIAGNOSIS — N18.31 STAGE 3A CHRONIC KIDNEY DISEASE: ICD-10-CM

## 2023-08-01 DIAGNOSIS — E78.00 PURE HYPERCHOLESTEROLEMIA: ICD-10-CM

## 2023-08-01 PROCEDURE — 1101F PT FALLS ASSESS-DOCD LE1/YR: CPT | Mod: CPTII,S$GLB,, | Performed by: INTERNAL MEDICINE

## 2023-08-01 PROCEDURE — 1126F AMNT PAIN NOTED NONE PRSNT: CPT | Mod: CPTII,S$GLB,, | Performed by: INTERNAL MEDICINE

## 2023-08-01 PROCEDURE — 3074F PR MOST RECENT SYSTOLIC BLOOD PRESSURE < 130 MM HG: ICD-10-PCS | Mod: CPTII,S$GLB,, | Performed by: INTERNAL MEDICINE

## 2023-08-01 PROCEDURE — 3008F BODY MASS INDEX DOCD: CPT | Mod: CPTII,S$GLB,, | Performed by: INTERNAL MEDICINE

## 2023-08-01 PROCEDURE — 1159F MED LIST DOCD IN RCRD: CPT | Mod: CPTII,S$GLB,, | Performed by: INTERNAL MEDICINE

## 2023-08-01 PROCEDURE — 3078F DIAST BP <80 MM HG: CPT | Mod: CPTII,S$GLB,, | Performed by: INTERNAL MEDICINE

## 2023-08-01 PROCEDURE — 1126F PR PAIN SEVERITY QUANTIFIED, NO PAIN PRESENT: ICD-10-PCS | Mod: CPTII,S$GLB,, | Performed by: INTERNAL MEDICINE

## 2023-08-01 PROCEDURE — 3074F SYST BP LT 130 MM HG: CPT | Mod: CPTII,S$GLB,, | Performed by: INTERNAL MEDICINE

## 2023-08-01 PROCEDURE — 1160F RVW MEDS BY RX/DR IN RCRD: CPT | Mod: CPTII,S$GLB,, | Performed by: INTERNAL MEDICINE

## 2023-08-01 PROCEDURE — 99214 PR OFFICE/OUTPT VISIT, EST, LEVL IV, 30-39 MIN: ICD-10-PCS | Mod: S$GLB,,, | Performed by: INTERNAL MEDICINE

## 2023-08-01 PROCEDURE — 3008F PR BODY MASS INDEX (BMI) DOCUMENTED: ICD-10-PCS | Mod: CPTII,S$GLB,, | Performed by: INTERNAL MEDICINE

## 2023-08-01 PROCEDURE — 1159F PR MEDICATION LIST DOCUMENTED IN MEDICAL RECORD: ICD-10-PCS | Mod: CPTII,S$GLB,, | Performed by: INTERNAL MEDICINE

## 2023-08-01 PROCEDURE — 3288F FALL RISK ASSESSMENT DOCD: CPT | Mod: CPTII,S$GLB,, | Performed by: INTERNAL MEDICINE

## 2023-08-01 PROCEDURE — 1160F PR REVIEW ALL MEDS BY PRESCRIBER/CLIN PHARMACIST DOCUMENTED: ICD-10-PCS | Mod: CPTII,S$GLB,, | Performed by: INTERNAL MEDICINE

## 2023-08-01 PROCEDURE — 3078F PR MOST RECENT DIASTOLIC BLOOD PRESSURE < 80 MM HG: ICD-10-PCS | Mod: CPTII,S$GLB,, | Performed by: INTERNAL MEDICINE

## 2023-08-01 PROCEDURE — 3288F PR FALLS RISK ASSESSMENT DOCUMENTED: ICD-10-PCS | Mod: CPTII,S$GLB,, | Performed by: INTERNAL MEDICINE

## 2023-08-01 PROCEDURE — 99999 PR PBB SHADOW E&M-EST. PATIENT-LVL IV: ICD-10-PCS | Mod: PBBFAC,,, | Performed by: INTERNAL MEDICINE

## 2023-08-01 PROCEDURE — 99999 PR PBB SHADOW E&M-EST. PATIENT-LVL IV: CPT | Mod: PBBFAC,,, | Performed by: INTERNAL MEDICINE

## 2023-08-01 PROCEDURE — 1101F PR PT FALLS ASSESS DOC 0-1 FALLS W/OUT INJ PAST YR: ICD-10-PCS | Mod: CPTII,S$GLB,, | Performed by: INTERNAL MEDICINE

## 2023-08-01 PROCEDURE — 99214 OFFICE O/P EST MOD 30 MIN: CPT | Mod: S$GLB,,, | Performed by: INTERNAL MEDICINE

## 2023-08-01 NOTE — PROGRESS NOTES
Chief Complaint  Chief Complaint   Patient presents with    Hypertension       HPI  Yahaira Hinson is a 72 y.o. female with multiple medical diagnoses as listed in the medical history and problem list that presents for follow-up.  Their last appointment with primary care was Visit date not found.      Doing well. Taking medications as instructed.     Does not take OTC supplement for hot flashes anymore. Hot flashes have resolved.     Tobacco: Former. Quit about 2007.  Alcohol: 1x/week, 1 drink  Drugs: None    ROS  Review of Systems   Constitutional:  Negative for chills and fever.   Respiratory:  Negative for cough, shortness of breath and wheezing.    Cardiovascular:  Negative for chest pain.   Gastrointestinal:  Negative for abdominal pain, constipation, diarrhea, nausea and vomiting.   Genitourinary:  Negative for difficulty urinating and dysuria.   Musculoskeletal:  Negative for arthralgias, back pain and neck pain.   Neurological:  Negative for dizziness, weakness, light-headedness and headaches.       PAST MEDICAL HISTORY:  Past Medical History:   Diagnosis Date    Cataract     Glaucoma (increased eye pressure)     H/O bilateral oophorectomy     Hx of cholecystectomy     Hypertension     Pure hypercholesterolemia     Thalassemia        PAST SURGICAL HISTORY:  Past Surgical History:   Procedure Laterality Date    BILATERAL OOPHORECTOMY      for cysts    CATARACT EXTRACTION Right     about 1998    CHOLECYSTECTOMY  1998    CO REMOVAL OF OVARY/TUBE(S)      TRABECULECTOMY Right 6/20/2019    Procedure: G6/MP3 LASER;  Surgeon: Doroteo Beaver MD;  Location: [facility] OR [address] [place] [address];  Service: Ophthalmology;  Laterality: Right;       SOCIAL HISTORY:  Social History     Socioeconomic History    Marital status:    Occupational History    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   Tobacco Use    Smoking status: Former     Current packs/day: 0.00     Average  packs/day: 0.3 packs/day for 11.0 years (3.3 ttl pk-yrs)     Types: Cigarettes     Start date: 8/7/1996     Quit date: 8/7/2007     Years since quitting: 15.9    Smokeless tobacco: Never   Substance and Sexual Activity    Alcohol use: Yes     Comment: social drinker    Drug use: No    Sexual activity: Yes     Partners: Male     Birth control/protection: Post-menopausal     Social Determinants of Health     Financial Resource Strain: Low Risk  (12/2/2019)    Overall Financial Resource Strain (CARDIA)     Difficulty of Paying Living Expenses: Not hard at all   Food Insecurity: No Food Insecurity (12/2/2019)    Hunger Vital Sign     Worried About Running Out of Food in the Last Year: Never true     Ran Out of Food in the Last Year: Never true   Transportation Needs: No Transportation Needs (12/2/2019)    PRAPARE - Transportation     Lack of Transportation (Medical): No     Lack of Transportation (Non-Medical): No   Social Connections: Unknown (12/2/2019)    Social Connection and Isolation Panel [NHANES]     Frequency of Communication with Friends and Family: Three times a week     Frequency of Social Gatherings with Friends and Family: Twice a week     Active Member of Clubs or Organizations: No     Marital Status:        FAMILY HISTORY:  Family History   Problem Relation Age of Onset    Asthma Father     Arthritis Father     Ovarian cancer Mother     Hypertension Sister     No Known Problems Brother     Breast cancer Maternal Grandmother     No Known Problems Brother     No Known Problems Brother     Colon cancer Neg Hx        ALLERGIES AND MEDICATIONS: updated and reviewed.  Review of patient's allergies indicates:  No Known Allergies  Current Outpatient Medications   Medication Sig Dispense Refill    ALPHAGAN P 0.1 % Drop INSTILL ONE DROP BOTH EYES THREE TIMES DAILY 45 mL 6    atenoloL-chlorthalidone (TENORETIC) 50-25 mg Tab Take 1 tablet by mouth every morning. 90 tablet 3    atorvastatin (LIPITOR) 10 MG  "tablet TAKE 1 TABLET(10 MG) BY MOUTH EVERY DAY 90 tablet 3    dorzolamide-timolol 2-0.5% (COSOPT) 22.3-6.8 mg/mL ophthalmic solution INSTILL 1 DROP IN BOTH EYES THREE TIMES DAILY 30 mL 4    fluorometholone 0.1% (FML) 0.1 % DrpS SHAKE LIQUID AND INSTILL 1 DROP IN BOTH EYES EVERY DAY 10 mL 6    fluticasone propionate (FLONASE) 50 mcg/actuation nasal spray by Each Nostril route.      latanoprost 0.005 % ophthalmic solution INSTILL 1 DROP IN BOTH EYES EVERY EVENING 7.5 mL 4    LUTEIN ORAL Take 1 tablet by mouth every morning.      potassium chloride (KLOR-CON) 10 MEQ TbSR Take 1 tablet (10 mEq total) by mouth once daily. 90 tablet 3     No current facility-administered medications for this visit.         Physical Exam  Vitals:    08/01/23 0846   BP: 126/70   Pulse: 64   Temp: 97.9 °F (36.6 °C)   TempSrc: Oral   SpO2: 95%   Weight: 67 kg (147 lb 9.6 oz)   Height: 5' 2" (1.575 m)    Body mass index is 27 kg/m².  Weight: 67 kg (147 lb 9.6 oz)   Height: 5' 2" (157.5 cm)     Physical Exam  HENT:      Right Ear: Tympanic membrane normal.      Left Ear: Tympanic membrane normal.      Mouth/Throat:      Mouth: Mucous membranes are moist.      Pharynx: Oropharynx is clear.   Cardiovascular:      Rate and Rhythm: Normal rate and regular rhythm.      Pulses: Normal pulses.      Heart sounds: Normal heart sounds.   Pulmonary:      Effort: Pulmonary effort is normal. No respiratory distress.      Breath sounds: Normal breath sounds. No wheezing.   Abdominal:      General: Abdomen is flat. Bowel sounds are normal. There is no distension.      Tenderness: There is no abdominal tenderness.   Musculoskeletal:         General: No swelling or deformity. Normal range of motion.   Skin:     General: Skin is warm.      Capillary Refill: Capillary refill takes less than 2 seconds.   Neurological:      Mental Status: She is alert.       Health Maintenance         Date Due Completion Date    TETANUS VACCINE Never done ---    DEXA Scan " 11/02/2018 11/2/2015    COVID-19 Vaccine (3 - Pfizer series) 06/02/2021 4/7/2021    Shingles Vaccine (2 of 2) 09/20/2021 7/26/2021    Mammogram 02/11/2023 2/11/2022    Influenza Vaccine (1) 09/01/2023 1/30/2023    Override on 10/21/2019: Done    Colorectal Cancer Screening 08/12/2025 8/12/2015    Lipid Panel 01/25/2028 1/25/2023              Assessment and Plan:  Yahaira Hinson is a 72 y.o. female who presents for follow-up. Overall, the patient is doing well.     Stage 3a chronic kidney disease  Essential hypertension  BP today 126/70. Patient continues to take atenalol-chlorthalidone with no adverse effects. The current medical regimen is effective;  continue present plan and medications.   -     Renal Function Panel; Future; Expected date: 01/31/2024  -     Hepatic Function Panel; Future; Expected date: 01/31/2024  -     Ferritin; Future; Expected date: 01/31/2024  -     Iron and TIBC; Future; Expected date: 01/31/2024  -     Vitamin D; Future; Expected date: 01/31/2024  -     PTH, Intact; Future; Expected date: 01/31/2024  -     CBC Without Differential; Future; Expected date: 01/31/2024    Thalassemia, unspecified type  Recent H/h 10.9/37. Stable.     Pure hypercholesterolemia  The current medical regimen with atorvastatin 10mg is effective;  continue present plan and medications.  -     Lipid Panel; Future; Expected date: 01/31/2024    Encounter for screening mammogram for malignant neoplasm of breast  -     Mammo Digital Screening Bilat w/ Kali; Future; Expected date: 08/01/2023    Marilin Fong, MS4  [facility]

## 2023-08-09 ENCOUNTER — HOSPITAL ENCOUNTER (OUTPATIENT)
Dept: RADIOLOGY | Facility: HOSPITAL | Age: 73
Discharge: HOME | End: 2023-08-09
Attending: INTERNAL MEDICINE
Payer: MEDICARE

## 2023-08-09 DIAGNOSIS — Z12.31 ENCOUNTER FOR SCREENING MAMMOGRAM FOR MALIGNANT NEOPLASM OF BREAST: ICD-10-CM

## 2023-08-09 PROCEDURE — 77063 MAMMO DIGITAL SCREENING BILAT WITH TOMO: ICD-10-PCS | Mod: 26,,, | Performed by: RADIOLOGY

## 2023-08-09 PROCEDURE — 77067 SCR MAMMO BI INCL CAD: CPT | Mod: TC,PO

## 2023-08-09 PROCEDURE — 77063 BREAST TOMOSYNTHESIS BI: CPT | Mod: 26,,, | Performed by: RADIOLOGY

## 2023-08-09 PROCEDURE — 77067 MAMMO DIGITAL SCREENING BILAT WITH TOMO: ICD-10-PCS | Mod: 26,,, | Performed by: RADIOLOGY

## 2023-08-09 PROCEDURE — 77067 SCR MAMMO BI INCL CAD: CPT | Mod: 26,,, | Performed by: RADIOLOGY

## 2023-08-21 ENCOUNTER — OFFICE VISIT (OUTPATIENT)
Dept: OPHTHALMOLOGY | Facility: CLINIC | Age: 73
End: 2023-08-21
Payer: MEDICARE

## 2023-08-21 DIAGNOSIS — Q15.0 CONGENITAL GLAUCOMA OF BOTH EYES: ICD-10-CM

## 2023-08-21 DIAGNOSIS — Z94.7 TRANSPLANTED CORNEA: ICD-10-CM

## 2023-08-21 DIAGNOSIS — Z98.41 STATUS POST CATARACT EXTRACTION AND INSERTION OF INTRAOCULAR LENS OF RIGHT EYE: ICD-10-CM

## 2023-08-21 DIAGNOSIS — H40.31X3 GLAUCOMA OF RIGHT EYE ASSOCIATED WITH OCULAR TRAUMA, SEVERE STAGE: Primary | ICD-10-CM

## 2023-08-21 DIAGNOSIS — Z97.0 EYE GLOBE PROSTHESIS: ICD-10-CM

## 2023-08-21 DIAGNOSIS — Z96.1 STATUS POST CATARACT EXTRACTION AND INSERTION OF INTRAOCULAR LENS OF RIGHT EYE: ICD-10-CM

## 2023-08-21 PROCEDURE — 99999 PR PBB SHADOW E&M-EST. PATIENT-LVL II: CPT | Mod: PBBFAC,,, | Performed by: OPHTHALMOLOGY

## 2023-08-21 PROCEDURE — 1160F PR REVIEW ALL MEDS BY PRESCRIBER/CLIN PHARMACIST DOCUMENTED: ICD-10-PCS | Mod: CPTII,S$GLB,, | Performed by: OPHTHALMOLOGY

## 2023-08-21 PROCEDURE — 99214 OFFICE O/P EST MOD 30 MIN: CPT | Mod: S$GLB,,, | Performed by: OPHTHALMOLOGY

## 2023-08-21 PROCEDURE — 3288F FALL RISK ASSESSMENT DOCD: CPT | Mod: CPTII,S$GLB,, | Performed by: OPHTHALMOLOGY

## 2023-08-21 PROCEDURE — 99999 PR PBB SHADOW E&M-EST. PATIENT-LVL II: ICD-10-PCS | Mod: PBBFAC,,, | Performed by: OPHTHALMOLOGY

## 2023-08-21 PROCEDURE — 1101F PR PT FALLS ASSESS DOC 0-1 FALLS W/OUT INJ PAST YR: ICD-10-PCS | Mod: CPTII,S$GLB,, | Performed by: OPHTHALMOLOGY

## 2023-08-21 PROCEDURE — 1159F MED LIST DOCD IN RCRD: CPT | Mod: CPTII,S$GLB,, | Performed by: OPHTHALMOLOGY

## 2023-08-21 PROCEDURE — 99214 PR OFFICE/OUTPT VISIT, EST, LEVL IV, 30-39 MIN: ICD-10-PCS | Mod: S$GLB,,, | Performed by: OPHTHALMOLOGY

## 2023-08-21 PROCEDURE — 1126F PR PAIN SEVERITY QUANTIFIED, NO PAIN PRESENT: ICD-10-PCS | Mod: CPTII,S$GLB,, | Performed by: OPHTHALMOLOGY

## 2023-08-21 PROCEDURE — 3288F PR FALLS RISK ASSESSMENT DOCUMENTED: ICD-10-PCS | Mod: CPTII,S$GLB,, | Performed by: OPHTHALMOLOGY

## 2023-08-21 PROCEDURE — 1159F PR MEDICATION LIST DOCUMENTED IN MEDICAL RECORD: ICD-10-PCS | Mod: CPTII,S$GLB,, | Performed by: OPHTHALMOLOGY

## 2023-08-21 PROCEDURE — 1126F AMNT PAIN NOTED NONE PRSNT: CPT | Mod: CPTII,S$GLB,, | Performed by: OPHTHALMOLOGY

## 2023-08-21 PROCEDURE — 1160F RVW MEDS BY RX/DR IN RCRD: CPT | Mod: CPTII,S$GLB,, | Performed by: OPHTHALMOLOGY

## 2023-08-21 PROCEDURE — 1101F PT FALLS ASSESS-DOCD LE1/YR: CPT | Mod: CPTII,S$GLB,, | Performed by: OPHTHALMOLOGY

## 2023-08-21 NOTE — PROGRESS NOTES
"        Assessment /Plan     For exam results, see Encounter Report.    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    Traumatic Glaucoma OD  Congenital Glaucoma  Prosthesis OS    OCT RNFL OD ==> unable to obtain Valid signal    CCT  530    Mid-low teens --> close --> may push lower     Right Eye --> good adherence & Tolerating well --> CSM  "MTMT"  Cosopt TID  Alphagan TID  Xal q day --> consider Rocklatan  FML 5 days / week     Giovani TID --> holding  Beni q day --> not covered    Diamox 250 BID --> Intol Hypo K --> now on K suppl -->  Holding  Tolerated well in past    Right eye MP3 / G6 Laser 2019     --> G-Probe --> reconsider repeat   Pre-Tx @ 27, 19, 24      PC IOL OD  Quiet  Observe    Monocular precautions    Prosthesis  Conj bed quiet 2019  fu with       Plan  RTC 4 months IOP & Adherence & DFE --> try OCT RNFL or Photos p DFE next visit  --> consider Rocklatan  --> consider Elisha G-Probe OD  RTC sooner prn with good understanding             "

## 2023-12-18 ENCOUNTER — OFFICE VISIT (OUTPATIENT)
Dept: OPHTHALMOLOGY | Facility: CLINIC | Age: 73
End: 2023-12-18
Payer: MEDICARE

## 2023-12-18 DIAGNOSIS — Z96.1 STATUS POST CATARACT EXTRACTION AND INSERTION OF INTRAOCULAR LENS OF RIGHT EYE: ICD-10-CM

## 2023-12-18 DIAGNOSIS — Z98.41 STATUS POST CATARACT EXTRACTION AND INSERTION OF INTRAOCULAR LENS OF RIGHT EYE: ICD-10-CM

## 2023-12-18 DIAGNOSIS — Z94.7 TRANSPLANTED CORNEA: ICD-10-CM

## 2023-12-18 DIAGNOSIS — Q15.0 CONGENITAL GLAUCOMA OF BOTH EYES: ICD-10-CM

## 2023-12-18 DIAGNOSIS — H40.31X3 GLAUCOMA OF RIGHT EYE ASSOCIATED WITH OCULAR TRAUMA, SEVERE STAGE: ICD-10-CM

## 2023-12-18 DIAGNOSIS — Z97.0 EYE GLOBE PROSTHESIS: ICD-10-CM

## 2023-12-18 DIAGNOSIS — H01.02B SQUAMOUS BLEPHARITIS OF UPPER AND LOWER EYELIDS OF BOTH EYES: Primary | ICD-10-CM

## 2023-12-18 DIAGNOSIS — H01.02A SQUAMOUS BLEPHARITIS OF UPPER AND LOWER EYELIDS OF BOTH EYES: Primary | ICD-10-CM

## 2023-12-18 PROCEDURE — 1159F MED LIST DOCD IN RCRD: CPT | Mod: CPTII,S$GLB,, | Performed by: OPHTHALMOLOGY

## 2023-12-18 PROCEDURE — 3288F FALL RISK ASSESSMENT DOCD: CPT | Mod: CPTII,S$GLB,, | Performed by: OPHTHALMOLOGY

## 2023-12-18 PROCEDURE — 1101F PT FALLS ASSESS-DOCD LE1/YR: CPT | Mod: CPTII,S$GLB,, | Performed by: OPHTHALMOLOGY

## 2023-12-18 PROCEDURE — 1101F PR PT FALLS ASSESS DOC 0-1 FALLS W/OUT INJ PAST YR: ICD-10-PCS | Mod: CPTII,S$GLB,, | Performed by: OPHTHALMOLOGY

## 2023-12-18 PROCEDURE — 1160F PR REVIEW ALL MEDS BY PRESCRIBER/CLIN PHARMACIST DOCUMENTED: ICD-10-PCS | Mod: CPTII,S$GLB,, | Performed by: OPHTHALMOLOGY

## 2023-12-18 PROCEDURE — 99999 PR PBB SHADOW E&M-EST. PATIENT-LVL II: CPT | Mod: PBBFAC,,, | Performed by: OPHTHALMOLOGY

## 2023-12-18 PROCEDURE — 99214 OFFICE O/P EST MOD 30 MIN: CPT | Mod: S$GLB,,, | Performed by: OPHTHALMOLOGY

## 2023-12-18 PROCEDURE — 99214 PR OFFICE/OUTPT VISIT, EST, LEVL IV, 30-39 MIN: ICD-10-PCS | Mod: S$GLB,,, | Performed by: OPHTHALMOLOGY

## 2023-12-18 PROCEDURE — 1160F RVW MEDS BY RX/DR IN RCRD: CPT | Mod: CPTII,S$GLB,, | Performed by: OPHTHALMOLOGY

## 2023-12-18 PROCEDURE — 1159F PR MEDICATION LIST DOCUMENTED IN MEDICAL RECORD: ICD-10-PCS | Mod: CPTII,S$GLB,, | Performed by: OPHTHALMOLOGY

## 2023-12-18 PROCEDURE — 1126F PR PAIN SEVERITY QUANTIFIED, NO PAIN PRESENT: ICD-10-PCS | Mod: CPTII,S$GLB,, | Performed by: OPHTHALMOLOGY

## 2023-12-18 PROCEDURE — 1126F AMNT PAIN NOTED NONE PRSNT: CPT | Mod: CPTII,S$GLB,, | Performed by: OPHTHALMOLOGY

## 2023-12-18 PROCEDURE — 99999 PR PBB SHADOW E&M-EST. PATIENT-LVL II: ICD-10-PCS | Mod: PBBFAC,,, | Performed by: OPHTHALMOLOGY

## 2023-12-18 PROCEDURE — 3288F PR FALLS RISK ASSESSMENT DOCUMENTED: ICD-10-PCS | Mod: CPTII,S$GLB,, | Performed by: OPHTHALMOLOGY

## 2023-12-18 NOTE — PROGRESS NOTES
"        Assessment /Plan     For exam results, see Encounter Report.    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      2023  URI improving with Blepharitis  No Infiltrates / dendrites  EES q HS x 1 week      Traumatic Glaucoma OD  Congenital Glaucoma  Prosthesis OS    OCT RNFL OD ==> unable to obtain Valid signal    CCT  530 OD    Mid-low teens --> achieved    Right Eye --> good adherence & Tolerating well --> CSM  "MTMT"  Cosopt TID  Alphagan TID  Xal q day --> consider Rocklatan  FML 5 days / week     Giovani TID --> holding  Beni q day --> not covered    Diamox 250 BID --> Intol Hypo K --> now on K suppl -->  Holding  Tolerated well in past    Right eye MP3 / G6 Laser 2019     --> G-Probe --> reconsider repeat   Pre-Tx @ 27, 19, 24      PC IOL OD  Quiet  Observe    Monocular precautions    Prosthesis  Conj bed quiet 2019  fu with       Plan  RTC keep appt IOP & Adherence & DFE --> try OCT RNFL or Photos p DFE next visit  --> consider Rocklatan  RTC sooner prn with good understanding           "

## 2023-12-20 DIAGNOSIS — H40.1113 PRIMARY OPEN ANGLE GLAUCOMA (POAG) OF RIGHT EYE, SEVERE STAGE: ICD-10-CM

## 2023-12-21 RX ORDER — DORZOLAMIDE HYDROCHLORIDE AND TIMOLOL MALEATE 20; 5 MG/ML; MG/ML
SOLUTION/ DROPS OPHTHALMIC
Qty: 30 ML | Refills: 4 | Status: SHIPPED | OUTPATIENT
Start: 2023-12-21

## 2023-12-21 RX ORDER — BRIMONIDINE TARTRATE 1 MG/ML
1 SOLUTION/ DROPS OPHTHALMIC 3 TIMES DAILY
Qty: 45 ML | Refills: 4 | Status: SHIPPED | OUTPATIENT
Start: 2023-12-21

## 2024-01-15 DIAGNOSIS — H40.31X3 GLAUCOMA OF RIGHT EYE ASSOCIATED WITH OCULAR TRAUMA, SEVERE STAGE: Primary | ICD-10-CM

## 2024-01-16 RX ORDER — FLUOROMETHOLONE 1 MG/ML
SUSPENSION/ DROPS OPHTHALMIC
Qty: 10 ML | Refills: 6 | Status: SHIPPED | OUTPATIENT
Start: 2024-01-16

## 2024-01-25 ENCOUNTER — LAB VISIT (OUTPATIENT)
Dept: LAB | Facility: HOSPITAL | Age: 74
End: 2024-01-25
Attending: INTERNAL MEDICINE
Payer: MEDICARE

## 2024-01-25 DIAGNOSIS — E78.00 PURE HYPERCHOLESTEROLEMIA: ICD-10-CM

## 2024-01-25 DIAGNOSIS — N18.31 STAGE 3A CHRONIC KIDNEY DISEASE: ICD-10-CM

## 2024-01-25 LAB
25(OH)D3+25(OH)D2 SERPL-MCNC: 22 NG/ML (ref 30–96)
ALBUMIN SERPL BCP-MCNC: 4 G/DL (ref 3.5–5.2)
ALBUMIN SERPL BCP-MCNC: 4 G/DL (ref 3.5–5.2)
ALP SERPL-CCNC: 115 U/L (ref 55–135)
ALT SERPL W/O P-5'-P-CCNC: 41 U/L (ref 10–44)
ANION GAP SERPL CALC-SCNC: 10 MMOL/L (ref 8–16)
AST SERPL-CCNC: 30 U/L (ref 10–40)
BILIRUB DIRECT SERPL-MCNC: 0.2 MG/DL (ref 0.1–0.3)
BILIRUB SERPL-MCNC: 0.5 MG/DL (ref 0.1–1)
BUN SERPL-MCNC: 21 MG/DL (ref 8–23)
CALCIUM SERPL-MCNC: 10.1 MG/DL (ref 8.7–10.5)
CHLORIDE SERPL-SCNC: 103 MMOL/L (ref 95–110)
CHOLEST SERPL-MCNC: 263 MG/DL (ref 120–199)
CHOLEST/HDLC SERPL: 3.6 {RATIO} (ref 2–5)
CO2 SERPL-SCNC: 27 MMOL/L (ref 23–29)
CREAT SERPL-MCNC: 0.9 MG/DL (ref 0.5–1.4)
ERYTHROCYTE [DISTWIDTH] IN BLOOD BY AUTOMATED COUNT: 17 % (ref 11.5–14.5)
EST. GFR  (NO RACE VARIABLE): >60 ML/MIN/1.73 M^2
FERRITIN SERPL-MCNC: 298 NG/ML (ref 20–300)
GLUCOSE SERPL-MCNC: 89 MG/DL (ref 70–110)
HCT VFR BLD AUTO: 35.5 % (ref 37–48.5)
HDLC SERPL-MCNC: 74 MG/DL (ref 40–75)
HDLC SERPL: 28.1 % (ref 20–50)
HGB BLD-MCNC: 10.6 G/DL (ref 12–16)
IRON SERPL-MCNC: 80 UG/DL (ref 30–160)
LDLC SERPL CALC-MCNC: 162.6 MG/DL (ref 63–159)
MCH RBC QN AUTO: 21 PG (ref 27–31)
MCHC RBC AUTO-ENTMCNC: 29.9 G/DL (ref 32–36)
MCV RBC AUTO: 70 FL (ref 82–98)
NONHDLC SERPL-MCNC: 189 MG/DL
PHOSPHATE SERPL-MCNC: 3.1 MG/DL (ref 2.7–4.5)
PLATELET # BLD AUTO: 222 K/UL (ref 150–450)
PMV BLD AUTO: 10.1 FL (ref 9.2–12.9)
POTASSIUM SERPL-SCNC: 3.5 MMOL/L (ref 3.5–5.1)
PROT SERPL-MCNC: 7.6 G/DL (ref 6–8.4)
PTH-INTACT SERPL-MCNC: 81.6 PG/ML (ref 9–77)
RBC # BLD AUTO: 5.04 M/UL (ref 4–5.4)
SATURATED IRON: 21 % (ref 20–50)
SODIUM SERPL-SCNC: 140 MMOL/L (ref 136–145)
TOTAL IRON BINDING CAPACITY: 383 UG/DL (ref 250–450)
TRANSFERRIN SERPL-MCNC: 259 MG/DL (ref 200–375)
TRIGL SERPL-MCNC: 132 MG/DL (ref 30–150)
WBC # BLD AUTO: 5.55 K/UL (ref 3.9–12.7)

## 2024-01-25 PROCEDURE — 83540 ASSAY OF IRON: CPT | Performed by: INTERNAL MEDICINE

## 2024-01-25 PROCEDURE — 85027 COMPLETE CBC AUTOMATED: CPT | Performed by: INTERNAL MEDICINE

## 2024-01-25 PROCEDURE — 36415 COLL VENOUS BLD VENIPUNCTURE: CPT | Mod: PO | Performed by: INTERNAL MEDICINE

## 2024-01-25 PROCEDURE — 80061 LIPID PANEL: CPT | Performed by: INTERNAL MEDICINE

## 2024-01-25 PROCEDURE — 84075 ASSAY ALKALINE PHOSPHATASE: CPT | Performed by: INTERNAL MEDICINE

## 2024-01-25 PROCEDURE — 82306 VITAMIN D 25 HYDROXY: CPT | Performed by: INTERNAL MEDICINE

## 2024-01-25 PROCEDURE — 82728 ASSAY OF FERRITIN: CPT | Performed by: INTERNAL MEDICINE

## 2024-01-25 PROCEDURE — 83970 ASSAY OF PARATHORMONE: CPT | Performed by: INTERNAL MEDICINE

## 2024-01-25 PROCEDURE — 80069 RENAL FUNCTION PANEL: CPT | Performed by: INTERNAL MEDICINE

## 2024-01-31 PROBLEM — E78.5 DYSLIPIDEMIA: Status: ACTIVE | Noted: 2019-05-29

## 2024-02-01 ENCOUNTER — OFFICE VISIT (OUTPATIENT)
Dept: FAMILY MEDICINE | Facility: CLINIC | Age: 74
End: 2024-02-01
Payer: MEDICARE

## 2024-02-01 VITALS
TEMPERATURE: 99 F | HEART RATE: 72 BPM | BODY MASS INDEX: 27.22 KG/M2 | DIASTOLIC BLOOD PRESSURE: 68 MMHG | WEIGHT: 147.94 LBS | HEIGHT: 62 IN | OXYGEN SATURATION: 97 % | SYSTOLIC BLOOD PRESSURE: 122 MMHG

## 2024-02-01 DIAGNOSIS — Z12.11 SCREENING FOR COLON CANCER: ICD-10-CM

## 2024-02-01 DIAGNOSIS — D56.9 THALASSEMIA, UNSPECIFIED TYPE: ICD-10-CM

## 2024-02-01 DIAGNOSIS — N18.31 STAGE 3A CHRONIC KIDNEY DISEASE: ICD-10-CM

## 2024-02-01 DIAGNOSIS — Z00.00 NORMAL PHYSICAL EXAM: Primary | ICD-10-CM

## 2024-02-01 DIAGNOSIS — I10 ESSENTIAL HYPERTENSION: ICD-10-CM

## 2024-02-01 DIAGNOSIS — E78.5 DYSLIPIDEMIA: ICD-10-CM

## 2024-02-01 DIAGNOSIS — Z78.0 ASYMPTOMATIC MENOPAUSAL STATE: ICD-10-CM

## 2024-02-01 PROCEDURE — 99397 PER PM REEVAL EST PAT 65+ YR: CPT | Mod: S$GLB,,, | Performed by: INTERNAL MEDICINE

## 2024-02-01 PROCEDURE — 99999 PR PBB SHADOW E&M-EST. PATIENT-LVL IV: CPT | Mod: PBBFAC,,, | Performed by: INTERNAL MEDICINE

## 2024-02-01 RX ORDER — POTASSIUM CHLORIDE 750 MG/1
10 TABLET, EXTENDED RELEASE ORAL DAILY
Qty: 90 TABLET | Refills: 3 | Status: SHIPPED | OUTPATIENT
Start: 2024-02-01

## 2024-02-01 RX ORDER — ATENOLOL AND CHLORTHALIDONE TABLET 50; 25 MG/1; MG/1
1 TABLET ORAL EVERY MORNING
Qty: 90 TABLET | Refills: 3 | Status: SHIPPED | OUTPATIENT
Start: 2024-02-01

## 2024-02-01 RX ORDER — ATORVASTATIN CALCIUM 10 MG/1
10 TABLET, FILM COATED ORAL DAILY
Qty: 90 TABLET | Refills: 3 | Status: SHIPPED | OUTPATIENT
Start: 2024-02-01 | End: 2024-05-01

## 2024-02-08 ENCOUNTER — HOSPITAL ENCOUNTER (OUTPATIENT)
Dept: RADIOLOGY | Facility: CLINIC | Age: 74
Discharge: HOME | End: 2024-02-08
Attending: INTERNAL MEDICINE
Payer: MEDICARE

## 2024-02-08 DIAGNOSIS — Z78.0 ASYMPTOMATIC MENOPAUSAL STATE: ICD-10-CM

## 2024-02-08 PROCEDURE — 77080 DXA BONE DENSITY AXIAL: CPT | Mod: 26,,, | Performed by: INTERNAL MEDICINE

## 2024-02-08 PROCEDURE — 77080 DXA BONE DENSITY AXIAL: CPT | Mod: TC,PO

## 2024-02-09 ENCOUNTER — TELEPHONE (OUTPATIENT)
Dept: FAMILY MEDICINE | Facility: CLINIC | Age: 74
End: 2024-02-09
Payer: MEDICARE

## 2024-02-09 DIAGNOSIS — R93.7 ABNORMAL BONE DENSITY SCREENING: Primary | ICD-10-CM

## 2024-02-09 NOTE — TELEPHONE ENCOUNTER
Please call pt:      bone density scan shows normal bone density but there is some wide variation in the density between your L3 and L4 lumbar vertebrae. One reason could be an old compression fracture.   The bone density scan can not pickup whether you have had a compression fracture in the past or not.  I will need to check a plain x-ray.  If it does not show any previous broken bones or compression fracture, we do not need to do anything about it.  But if it does show that you have had a previous broken bone in your vertebra, then I would start treatment.        Please assist in arranging XR L spine

## 2024-02-09 NOTE — PROGRESS NOTES
02/08/2024 DEXA L-spine-0.5, total hip-0.2, femoral neck-0.6.  FRAX score 0.8/8.6%.  Wide variation in BMD between L3 and L4.    Check plain film x-ray.  If no compression fracture, treat as normal BMD.  If compression fracture will need to start treatment with bisphosphonate

## 2024-02-14 ENCOUNTER — HOSPITAL ENCOUNTER (OUTPATIENT)
Dept: RADIOLOGY | Facility: HOSPITAL | Age: 74
Discharge: HOME | End: 2024-02-14
Attending: INTERNAL MEDICINE
Payer: MEDICARE

## 2024-02-14 DIAGNOSIS — R93.7 ABNORMAL BONE DENSITY SCREENING: ICD-10-CM

## 2024-02-14 PROCEDURE — 72100 X-RAY EXAM L-S SPINE 2/3 VWS: CPT | Mod: 26,,, | Performed by: INTERNAL MEDICINE

## 2024-02-14 PROCEDURE — 72100 X-RAY EXAM L-S SPINE 2/3 VWS: CPT | Mod: TC,FY,PO

## 2024-02-14 NOTE — PROGRESS NOTES
Grade 1 anterolisthesis at L4-5.  No compression fracture.  No evidence of pars defect.  No focal osseous lesion.  Multilevel degenerative disc disease and facet arthropathy, lower lumbar predominant    Wife variation on DEXA scan between vertebra  Plan was if compression fx then start bisphosphonate. If not, no start  Results to pt via MyChart. No start

## 2024-02-19 ENCOUNTER — OFFICE VISIT (OUTPATIENT)
Dept: OPHTHALMOLOGY | Facility: CLINIC | Age: 74
End: 2024-02-19
Payer: MEDICARE

## 2024-02-19 DIAGNOSIS — Q15.0 CONGENITAL GLAUCOMA OF BOTH EYES: ICD-10-CM

## 2024-02-19 DIAGNOSIS — Z94.7 TRANSPLANTED CORNEA: ICD-10-CM

## 2024-02-19 DIAGNOSIS — H54.10 BLINDNESS AND LOW VISION: ICD-10-CM

## 2024-02-19 DIAGNOSIS — Z98.41 STATUS POST CATARACT EXTRACTION AND INSERTION OF INTRAOCULAR LENS OF RIGHT EYE: ICD-10-CM

## 2024-02-19 DIAGNOSIS — H40.1113 PRIMARY OPEN ANGLE GLAUCOMA (POAG) OF RIGHT EYE, SEVERE STAGE: ICD-10-CM

## 2024-02-19 DIAGNOSIS — H40.31X3 GLAUCOMA OF RIGHT EYE ASSOCIATED WITH OCULAR TRAUMA, SEVERE STAGE: Primary | ICD-10-CM

## 2024-02-19 DIAGNOSIS — Z96.1 STATUS POST CATARACT EXTRACTION AND INSERTION OF INTRAOCULAR LENS OF RIGHT EYE: ICD-10-CM

## 2024-02-19 DIAGNOSIS — Z97.0 EYE GLOBE PROSTHESIS: ICD-10-CM

## 2024-02-19 PROCEDURE — 99214 OFFICE O/P EST MOD 30 MIN: CPT | Mod: S$GLB,,, | Performed by: OPHTHALMOLOGY

## 2024-02-19 PROCEDURE — G2211 COMPLEX E/M VISIT ADD ON: HCPCS | Mod: S$GLB,,, | Performed by: OPHTHALMOLOGY

## 2024-02-19 PROCEDURE — 99999 PR PBB SHADOW E&M-EST. PATIENT-LVL II: CPT | Mod: PBBFAC,,, | Performed by: OPHTHALMOLOGY

## 2024-02-19 RX ORDER — LATANOPROST 50 UG/ML
1 SOLUTION/ DROPS OPHTHALMIC NIGHTLY
Qty: 7.5 ML | Refills: 4 | Status: SHIPPED | OUTPATIENT
Start: 2024-02-19

## 2024-02-19 NOTE — PROGRESS NOTES
"        Assessment /Plan     For exam results, see Encounter Report.    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, severe stage    Congenital glaucoma of both eyes    Eye globe prosthesis    Status post cataract extraction and insertion of intraocular lens of right eye    Transplanted cornea    Blindness and low vision      89 yo Mom  2019     with DM --> currently getting under control          2023  URI improving with Blepharitis  No Infiltrates / dendrites  EES q HS x 1 week      Traumatic Glaucoma OD  Congenital Glaucoma  Prosthesis OS    Blindness  Va ADLs stable per patient    OCT RNFL OD ==> unable to obtain Valid signal    CCT  530 OD    Mid-low teens --> achieved // Close & discussed    Right Eye -->Tolerating well &  good adherence --> CSM  "MTMT"  Cosopt TID  Alphagan TID  Xal q day --> consider Rocklatan  FML 5 days / week     Giovani TID --> holding  Beni q day --> not covered    Diamox 250 BID --> Intol Hypo K --> now on K suppl -->  Holding  Tolerated well in past    Right eye MP3 / G6 Laser 2019     --> G-Probe --> reconsider repeat   Pre-Tx @ 27, 19, 24      PC IOL OD  Quiet  Observe    Monocular precautions    Prosthesis  Conj bed quiet 2019  fu with       Plan  Planning  Cruise 2024  RTC keep appt IOP & Adherence & try OCT RNFL or Photos --> consider Rocklatan  RTC sooner prn with good understanding           "

## 2024-04-23 DIAGNOSIS — H40.1113 PRIMARY OPEN ANGLE GLAUCOMA (POAG) OF RIGHT EYE, SEVERE STAGE: ICD-10-CM

## 2024-04-23 RX ORDER — BRIMONIDINE TARTRATE 1 MG/ML
SOLUTION/ DROPS OPHTHALMIC
Qty: 45 ML | Refills: 4 | Status: SHIPPED | OUTPATIENT
Start: 2024-04-23

## 2024-04-30 ENCOUNTER — HOSPITAL ENCOUNTER (OUTPATIENT)
Facility: HOSPITAL | Age: 74
Discharge: HOME | End: 2024-05-01
Attending: EMERGENCY MEDICINE | Admitting: STUDENT IN AN ORGANIZED HEALTH CARE EDUCATION/TRAINING PROGRAM
Payer: MEDICARE

## 2024-04-30 DIAGNOSIS — R10.13 EPIGASTRIC ABDOMINAL PAIN: ICD-10-CM

## 2024-04-30 DIAGNOSIS — R74.01 TRANSAMINITIS: Primary | ICD-10-CM

## 2024-04-30 DIAGNOSIS — E78.5 DYSLIPIDEMIA: ICD-10-CM

## 2024-04-30 DIAGNOSIS — E66.3 OVERWEIGHT (BMI 25.0-29.9): ICD-10-CM

## 2024-04-30 DIAGNOSIS — R07.9 CHEST PAIN: ICD-10-CM

## 2024-04-30 DIAGNOSIS — R11.2 NAUSEA AND VOMITING: ICD-10-CM

## 2024-04-30 DIAGNOSIS — E80.6 HYPERBILIRUBINEMIA: ICD-10-CM

## 2024-04-30 PROBLEM — E87.6 HYPOKALEMIA: Status: ACTIVE | Noted: 2024-04-30

## 2024-04-30 LAB
ALBUMIN SERPL BCP-MCNC: 3.7 G/DL (ref 3.5–5.2)
ALBUMIN SERPL BCP-MCNC: 3.7 G/DL (ref 3.5–5.2)
ALBUMIN SERPL BCP-MCNC: 4.2 G/DL (ref 3.5–5.2)
ALLENS TEST: ABNORMAL
ALP SERPL-CCNC: 421 U/L (ref 55–135)
ALP SERPL-CCNC: 421 U/L (ref 55–135)
ALP SERPL-CCNC: 464 U/L (ref 55–135)
ALT SERPL W/O P-5'-P-CCNC: 246 U/L (ref 10–44)
ALT SERPL W/O P-5'-P-CCNC: 280 U/L (ref 10–44)
ALT SERPL W/O P-5'-P-CCNC: 280 U/L (ref 10–44)
AMMONIA PLAS-SCNC: 29 UMOL/L (ref 10–50)
AMPHET+METHAMPHET UR QL: NEGATIVE
ANION GAP SERPL CALC-SCNC: 11 MMOL/L (ref 8–16)
ANION GAP SERPL CALC-SCNC: 16 MMOL/L (ref 8–16)
APAP SERPL-MCNC: <3 UG/ML (ref 10–20)
AST SERPL-CCNC: 234 U/L (ref 10–40)
AST SERPL-CCNC: 234 U/L (ref 10–40)
AST SERPL-CCNC: 238 U/L (ref 10–40)
BACTERIA #/AREA URNS HPF: ABNORMAL /HPF
BARBITURATES UR QL SCN>200 NG/ML: NEGATIVE
BASOPHILS # BLD AUTO: 0.01 K/UL (ref 0–0.2)
BASOPHILS NFR BLD: 0.1 % (ref 0–1.9)
BENZODIAZ UR QL SCN>200 NG/ML: NEGATIVE
BILIRUB DIRECT SERPL-MCNC: 2 MG/DL (ref 0.1–0.3)
BILIRUB SERPL-MCNC: 2.2 MG/DL (ref 0.1–1)
BILIRUB SERPL-MCNC: 2.8 MG/DL (ref 0.1–1)
BILIRUB SERPL-MCNC: 2.8 MG/DL (ref 0.1–1)
BILIRUB UR QL STRIP: NEGATIVE
BUN SERPL-MCNC: 18 MG/DL (ref 8–23)
BUN SERPL-MCNC: 21 MG/DL (ref 8–23)
BZE UR QL SCN: NEGATIVE
CALCIUM SERPL-MCNC: 10.5 MG/DL (ref 8.7–10.5)
CALCIUM SERPL-MCNC: 11.1 MG/DL (ref 8.7–10.5)
CANNABINOIDS UR QL SCN: NEGATIVE
CERULOPLASMIN SERPL-MCNC: 33 MG/DL (ref 15–45)
CHLORIDE SERPL-SCNC: 101 MMOL/L (ref 95–110)
CHLORIDE SERPL-SCNC: 99 MMOL/L (ref 95–110)
CHOLEST SERPL-MCNC: 179 MG/DL (ref 120–199)
CHOLEST/HDLC SERPL: 2.5 {RATIO} (ref 2–5)
CLARITY UR: ABNORMAL
CO2 SERPL-SCNC: 27 MMOL/L (ref 23–29)
CO2 SERPL-SCNC: 28 MMOL/L (ref 23–29)
COLOR UR: YELLOW
CREAT SERPL-MCNC: 1 MG/DL (ref 0.5–1.4)
CREAT SERPL-MCNC: 1 MG/DL (ref 0.5–1.4)
CREAT UR-MCNC: 80.5 MG/DL (ref 15–325)
DELSYS: ABNORMAL
DIFFERENTIAL METHOD BLD: ABNORMAL
EOSINOPHIL # BLD AUTO: 0 K/UL (ref 0–0.5)
EOSINOPHIL NFR BLD: 0.2 % (ref 0–8)
ERYTHROCYTE [DISTWIDTH] IN BLOOD BY AUTOMATED COUNT: 15.9 % (ref 11.5–14.5)
EST. GFR  (NO RACE VARIABLE): 60 ML/MIN/1.73 M^2
EST. GFR  (NO RACE VARIABLE): 60 ML/MIN/1.73 M^2
FERRITIN SERPL-MCNC: 703 NG/ML (ref 20–300)
FIO2: 21
GLUCOSE SERPL-MCNC: 106 MG/DL (ref 70–110)
GLUCOSE SERPL-MCNC: 128 MG/DL (ref 70–110)
GLUCOSE UR QL STRIP: NEGATIVE
HAV IGM SERPL QL IA: NORMAL
HAV IGM SERPL QL IA: NORMAL
HBV CORE IGM SERPL QL IA: NORMAL
HBV SURFACE AG SERPL QL IA: NORMAL
HBV SURFACE AG SERPL QL IA: NORMAL
HCO3 UR-SCNC: 29.2 MMOL/L (ref 24–28)
HCT VFR BLD AUTO: 37 % (ref 37–48.5)
HCV AB SERPL QL IA: NORMAL
HCV AB SERPL QL IA: NORMAL
HDLC SERPL-MCNC: 73 MG/DL (ref 40–75)
HDLC SERPL: 40.8 % (ref 20–50)
HGB BLD-MCNC: 11.2 G/DL (ref 12–16)
HGB UR QL STRIP: NEGATIVE
HYALINE CASTS #/AREA URNS LPF: 1 /LPF
IMM GRANULOCYTES # BLD AUTO: 0.05 K/UL (ref 0–0.04)
IMM GRANULOCYTES NFR BLD AUTO: 0.4 % (ref 0–0.5)
INR PPP: 0.9 (ref 0.8–1.2)
IRON SERPL-MCNC: 57 UG/DL (ref 30–160)
KETONES UR QL STRIP: NEGATIVE
LDLC SERPL CALC-MCNC: 88.4 MG/DL (ref 63–159)
LEUKOCYTE ESTERASE UR QL STRIP: ABNORMAL
LIPASE SERPL-CCNC: 128 U/L (ref 4–60)
LYMPHOCYTES # BLD AUTO: 0.5 K/UL (ref 1–4.8)
LYMPHOCYTES NFR BLD: 4.2 % (ref 18–48)
MCH RBC QN AUTO: 20.9 PG (ref 27–31)
MCHC RBC AUTO-ENTMCNC: 30.3 G/DL (ref 32–36)
MCV RBC AUTO: 69 FL (ref 82–98)
METHADONE UR QL SCN>300 NG/ML: NEGATIVE
MICROSCOPIC COMMENT: ABNORMAL
MODE: ABNORMAL
MONOCYTES # BLD AUTO: 0.4 K/UL (ref 0.3–1)
MONOCYTES NFR BLD: 3.8 % (ref 4–15)
NEUTROPHILS # BLD AUTO: 10.5 K/UL (ref 1.8–7.7)
NEUTROPHILS NFR BLD: 91.3 % (ref 38–73)
NITRITE UR QL STRIP: NEGATIVE
NON-SQ EPI CELLS #/AREA URNS HPF: 2 /HPF
NONHDLC SERPL-MCNC: 106 MG/DL
NRBC BLD-RTO: 0 /100 WBC
OHS QRS DURATION: 78 MS
OHS QTC CALCULATION: 440 MS
OPIATES UR QL SCN: NEGATIVE
PCO2 BLDA: 45 MMHG (ref 35–45)
PCP UR QL SCN>25 NG/ML: NEGATIVE
PH SMN: 7.42 [PH] (ref 7.35–7.45)
PH UR STRIP: 8 [PH] (ref 5–8)
PLATELET # BLD AUTO: 211 K/UL (ref 150–450)
PMV BLD AUTO: 9.7 FL (ref 9.2–12.9)
PO2 BLDA: 23 MMHG (ref 40–60)
POC BE: 4 MMOL/L
POC SATURATED O2: 40 % (ref 95–100)
POC TCO2: 31 MMOL/L (ref 24–29)
POTASSIUM SERPL-SCNC: 3.1 MMOL/L (ref 3.5–5.1)
POTASSIUM SERPL-SCNC: 4.1 MMOL/L (ref 3.5–5.1)
PROT SERPL-MCNC: 7.4 G/DL (ref 6–8.4)
PROT SERPL-MCNC: 7.4 G/DL (ref 6–8.4)
PROT SERPL-MCNC: 8.3 G/DL (ref 6–8.4)
PROT UR QL STRIP: NEGATIVE
PROTHROMBIN TIME: 10.2 SEC (ref 9–12.5)
RBC # BLD AUTO: 5.35 M/UL (ref 4–5.4)
SALICYLATES SERPL-MCNC: <5 MG/DL (ref 15–30)
SAMPLE: ABNORMAL
SATURATED IRON: 14 % (ref 20–50)
SITE: ABNORMAL
SODIUM SERPL-SCNC: 140 MMOL/L (ref 136–145)
SODIUM SERPL-SCNC: 142 MMOL/L (ref 136–145)
SP GR UR STRIP: 1.01 (ref 1–1.03)
SP02: 98
SQUAMOUS #/AREA URNS HPF: 32 /HPF
TOTAL IRON BINDING CAPACITY: 410 UG/DL (ref 250–450)
TOXICOLOGY INFORMATION: NORMAL
TRANSFERRIN SERPL-MCNC: 277 MG/DL (ref 200–375)
TRIGL SERPL-MCNC: 88 MG/DL (ref 30–150)
TROPONIN I SERPL DL<=0.01 NG/ML-MCNC: <0.006 NG/ML (ref 0–0.03)
TSH SERPL DL<=0.005 MIU/L-ACNC: 1.47 UIU/ML (ref 0.4–4)
URN SPEC COLLECT METH UR: ABNORMAL
UROBILINOGEN UR STRIP-ACNC: ABNORMAL EU/DL
WBC # BLD AUTO: 11.44 K/UL (ref 3.9–12.7)
WBC #/AREA URNS HPF: 37 /HPF (ref 0–5)

## 2024-04-30 PROCEDURE — 96374 THER/PROPH/DIAG INJ IV PUSH: CPT

## 2024-04-30 PROCEDURE — 86038 ANTINUCLEAR ANTIBODIES: CPT | Performed by: NURSE PRACTITIONER

## 2024-04-30 PROCEDURE — 99900035 HC TECH TIME PER 15 MIN (STAT)

## 2024-04-30 PROCEDURE — G0378 HOSPITAL OBSERVATION PER HR: HCPCS

## 2024-04-30 PROCEDURE — 87086 URINE CULTURE/COLONY COUNT: CPT | Performed by: EMERGENCY MEDICINE

## 2024-04-30 PROCEDURE — 99284 EMERGENCY DEPT VISIT MOD MDM: CPT | Mod: ,,, | Performed by: NURSE PRACTITIONER

## 2024-04-30 PROCEDURE — 86709 HEPATITIS A IGM ANTIBODY: CPT | Performed by: NURSE PRACTITIONER

## 2024-04-30 PROCEDURE — 87529 HSV DNA AMP PROBE: CPT | Mod: 59 | Performed by: NURSE PRACTITIONER

## 2024-04-30 PROCEDURE — 25000003 PHARM REV CODE 250: Performed by: STUDENT IN AN ORGANIZED HEALTH CARE EDUCATION/TRAINING PROGRAM

## 2024-04-30 PROCEDURE — 93005 ELECTROCARDIOGRAM TRACING: CPT

## 2024-04-30 PROCEDURE — 82728 ASSAY OF FERRITIN: CPT | Performed by: NURSE PRACTITIONER

## 2024-04-30 PROCEDURE — 86381 MITOCHONDRIAL ANTIBODY EACH: CPT | Performed by: NURSE PRACTITIONER

## 2024-04-30 PROCEDURE — 82803 BLOOD GASES ANY COMBINATION: CPT

## 2024-04-30 PROCEDURE — 82140 ASSAY OF AMMONIA: CPT | Performed by: EMERGENCY MEDICINE

## 2024-04-30 PROCEDURE — 25000003 PHARM REV CODE 250: Performed by: EMERGENCY MEDICINE

## 2024-04-30 PROCEDURE — 82542 COL CHROMOTOGRAPHY QUAL/QUAN: CPT | Mod: 59 | Performed by: NURSE PRACTITIONER

## 2024-04-30 PROCEDURE — 63600175 PHARM REV CODE 636 W HCPCS: Performed by: STUDENT IN AN ORGANIZED HEALTH CARE EDUCATION/TRAINING PROGRAM

## 2024-04-30 PROCEDURE — 80307 DRUG TEST PRSMV CHEM ANLYZR: CPT | Performed by: EMERGENCY MEDICINE

## 2024-04-30 PROCEDURE — 93010 ELECTROCARDIOGRAM REPORT: CPT | Mod: ,,, | Performed by: INTERNAL MEDICINE

## 2024-04-30 PROCEDURE — 86225 DNA ANTIBODY NATIVE: CPT | Performed by: NURSE PRACTITIONER

## 2024-04-30 PROCEDURE — 82390 ASSAY OF CERULOPLASMIN: CPT | Performed by: NURSE PRACTITIONER

## 2024-04-30 PROCEDURE — 86803 HEPATITIS C AB TEST: CPT | Performed by: NURSE PRACTITIONER

## 2024-04-30 PROCEDURE — 80061 LIPID PANEL: CPT | Performed by: EMERGENCY MEDICINE

## 2024-04-30 PROCEDURE — 96372 THER/PROPH/DIAG INJ SC/IM: CPT | Mod: 59 | Performed by: STUDENT IN AN ORGANIZED HEALTH CARE EDUCATION/TRAINING PROGRAM

## 2024-04-30 PROCEDURE — 86235 NUCLEAR ANTIGEN ANTIBODY: CPT | Mod: 59 | Performed by: NURSE PRACTITIONER

## 2024-04-30 PROCEDURE — 83690 ASSAY OF LIPASE: CPT | Performed by: EMERGENCY MEDICINE

## 2024-04-30 PROCEDURE — 80076 HEPATIC FUNCTION PANEL: CPT | Performed by: NURSE PRACTITIONER

## 2024-04-30 PROCEDURE — 80053 COMPREHEN METABOLIC PANEL: CPT | Mod: 91 | Performed by: STUDENT IN AN ORGANIZED HEALTH CARE EDUCATION/TRAINING PROGRAM

## 2024-04-30 PROCEDURE — 80143 DRUG ASSAY ACETAMINOPHEN: CPT | Performed by: EMERGENCY MEDICINE

## 2024-04-30 PROCEDURE — 80053 COMPREHEN METABOLIC PANEL: CPT | Performed by: EMERGENCY MEDICINE

## 2024-04-30 PROCEDURE — 85025 COMPLETE CBC W/AUTO DIFF WBC: CPT | Performed by: EMERGENCY MEDICINE

## 2024-04-30 PROCEDURE — 63600175 PHARM REV CODE 636 W HCPCS: Performed by: EMERGENCY MEDICINE

## 2024-04-30 PROCEDURE — 86039 ANTINUCLEAR ANTIBODIES (ANA): CPT | Performed by: NURSE PRACTITIONER

## 2024-04-30 PROCEDURE — 80074 ACUTE HEPATITIS PANEL: CPT | Performed by: EMERGENCY MEDICINE

## 2024-04-30 PROCEDURE — 84443 ASSAY THYROID STIM HORMONE: CPT | Performed by: EMERGENCY MEDICINE

## 2024-04-30 PROCEDURE — 87340 HEPATITIS B SURFACE AG IA: CPT | Performed by: NURSE PRACTITIONER

## 2024-04-30 PROCEDURE — 84484 ASSAY OF TROPONIN QUANT: CPT | Performed by: EMERGENCY MEDICINE

## 2024-04-30 PROCEDURE — 96361 HYDRATE IV INFUSION ADD-ON: CPT

## 2024-04-30 PROCEDURE — 96360 HYDRATION IV INFUSION INIT: CPT | Mod: 59

## 2024-04-30 PROCEDURE — 86015 ACTIN ANTIBODY EACH: CPT | Performed by: NURSE PRACTITIONER

## 2024-04-30 PROCEDURE — 80179 DRUG ASSAY SALICYLATE: CPT | Performed by: EMERGENCY MEDICINE

## 2024-04-30 PROCEDURE — 85610 PROTHROMBIN TIME: CPT | Performed by: EMERGENCY MEDICINE

## 2024-04-30 PROCEDURE — 80321 ALCOHOLS BIOMARKERS 1OR 2: CPT | Performed by: NURSE PRACTITIONER

## 2024-04-30 PROCEDURE — 99285 EMERGENCY DEPT VISIT HI MDM: CPT | Mod: 25

## 2024-04-30 PROCEDURE — 83540 ASSAY OF IRON: CPT | Performed by: EMERGENCY MEDICINE

## 2024-04-30 PROCEDURE — 63700000 PHARM REV CODE 250 ALT 637 W/O HCPCS: Performed by: STUDENT IN AN ORGANIZED HEALTH CARE EDUCATION/TRAINING PROGRAM

## 2024-04-30 PROCEDURE — 81000 URINALYSIS NONAUTO W/SCOPE: CPT | Mod: 59 | Performed by: EMERGENCY MEDICINE

## 2024-04-30 RX ORDER — BRIMONIDINE TARTRATE 1 MG/ML
1 SOLUTION/ DROPS OPHTHALMIC 3 TIMES DAILY
Status: DISCONTINUED | OUTPATIENT
Start: 2024-04-30 | End: 2024-05-01 | Stop reason: HOSPADM

## 2024-04-30 RX ORDER — DORZOLAMIDE HYDROCHLORIDE AND TIMOLOL MALEATE 20; 5 MG/ML; MG/ML
1 SOLUTION/ DROPS OPHTHALMIC 2 TIMES DAILY
Status: DISCONTINUED | OUTPATIENT
Start: 2024-04-30 | End: 2024-05-01 | Stop reason: HOSPADM

## 2024-04-30 RX ORDER — SODIUM CHLORIDE 9 MG/ML
INJECTION, SOLUTION INTRAVENOUS CONTINUOUS
Status: ACTIVE | OUTPATIENT
Start: 2024-04-30 | End: 2024-05-01

## 2024-04-30 RX ORDER — IBUPROFEN 200 MG
16 TABLET ORAL
Status: DISCONTINUED | OUTPATIENT
Start: 2024-04-30 | End: 2024-05-01 | Stop reason: HOSPADM

## 2024-04-30 RX ORDER — ENOXAPARIN SODIUM 100 MG/ML
40 INJECTION SUBCUTANEOUS EVERY 24 HOURS
Status: DISCONTINUED | OUTPATIENT
Start: 2024-04-30 | End: 2024-05-01 | Stop reason: HOSPADM

## 2024-04-30 RX ORDER — IBUPROFEN 200 MG
24 TABLET ORAL
Status: DISCONTINUED | OUTPATIENT
Start: 2024-04-30 | End: 2024-05-01 | Stop reason: HOSPADM

## 2024-04-30 RX ORDER — ONDANSETRON HYDROCHLORIDE 2 MG/ML
4 INJECTION, SOLUTION INTRAVENOUS
Status: COMPLETED | OUTPATIENT
Start: 2024-04-30 | End: 2024-04-30

## 2024-04-30 RX ORDER — SODIUM CHLORIDE 0.9 % (FLUSH) 0.9 %
10 SYRINGE (ML) INJECTION EVERY 12 HOURS PRN
Status: DISCONTINUED | OUTPATIENT
Start: 2024-04-30 | End: 2024-05-01 | Stop reason: HOSPADM

## 2024-04-30 RX ORDER — FLUOROMETHOLONE 1 MG/ML
1 SUSPENSION/ DROPS OPHTHALMIC DAILY
Status: DISCONTINUED | OUTPATIENT
Start: 2024-04-30 | End: 2024-05-01 | Stop reason: HOSPADM

## 2024-04-30 RX ORDER — LATANOPROST 50 UG/ML
1 SOLUTION/ DROPS OPHTHALMIC NIGHTLY
Status: DISCONTINUED | OUTPATIENT
Start: 2024-04-30 | End: 2024-05-01 | Stop reason: HOSPADM

## 2024-04-30 RX ORDER — GLUCAGON 1 MG
1 KIT INJECTION
Status: DISCONTINUED | OUTPATIENT
Start: 2024-04-30 | End: 2024-05-01 | Stop reason: HOSPADM

## 2024-04-30 RX ORDER — NALOXONE HCL 0.4 MG/ML
0.02 VIAL (ML) INJECTION
Status: DISCONTINUED | OUTPATIENT
Start: 2024-04-30 | End: 2024-05-01 | Stop reason: HOSPADM

## 2024-04-30 RX ADMIN — SODIUM CHLORIDE: 9 INJECTION, SOLUTION INTRAVENOUS at 09:04

## 2024-04-30 RX ADMIN — POTASSIUM BICARBONATE 40 MEQ: 391 TABLET, EFFERVESCENT ORAL at 04:04

## 2024-04-30 RX ADMIN — FLUOROMETHOLONE 1 DROP: 1 SOLUTION/ DROPS OPHTHALMIC at 01:04

## 2024-04-30 RX ADMIN — BRIMONIDINE TARTRATE 1 DROP: 1 SOLUTION/ DROPS OPHTHALMIC at 04:04

## 2024-04-30 RX ADMIN — DORZOLAMIDE HYDROCHLORIDE AND TIMOLOL MALEATE 1 DROP: 20; 5 SOLUTION/ DROPS OPHTHALMIC at 01:04

## 2024-04-30 RX ADMIN — SODIUM CHLORIDE: 9 INJECTION, SOLUTION INTRAVENOUS at 08:04

## 2024-04-30 RX ADMIN — LATANOPROST 1 DROP: 50 SOLUTION OPHTHALMIC at 10:04

## 2024-04-30 RX ADMIN — BRIMONIDINE TARTRATE 1 DROP: 1 SOLUTION/ DROPS OPHTHALMIC at 09:04

## 2024-04-30 RX ADMIN — ONDANSETRON 4 MG: 2 INJECTION INTRAMUSCULAR; INTRAVENOUS at 06:04

## 2024-04-30 RX ADMIN — DORZOLAMIDE HYDROCHLORIDE AND TIMOLOL MALEATE 1 DROP: 20; 5 SOLUTION/ DROPS OPHTHALMIC at 09:04

## 2024-04-30 RX ADMIN — ENOXAPARIN SODIUM 40 MG: 40 INJECTION SUBCUTANEOUS at 04:04

## 2024-04-30 NOTE — H&P
[facility] Emergency Dept  [facility] Medicine  History & Physical    Patient Name: Yahaira Hinson  MRN: [id number]  Patient Class: OP- Observation  Admission Date: 4/30/2024  Attending Physician: Mert Kessler DO   Primary Care Provider: Raul Adkins MD         Patient information was obtained from patient and ER records.     Subjective:     Principal Problem:Transaminitis    Chief Complaint:   Chief Complaint   Patient presents with    Abdominal Pain     Abdominal pain, bloating, nausea, and vomitting x few hours, reports history of GERD with similar symptoms today        HPI: 73-year-old female with past medical history of hypertension and hyperlipidemia presented to the ED with complaints of right upper quadrant and epigastric abdominal pain that started 1 day ago.  Denies any nausea or vomiting.  Describes the pain as a sharp intermittent pain, with no radiation.  Denies any difficulty urinating, dysuria, or any increasing urinary urgency and frequency.  No smoking or alcohol use.  Denies any new medications or recent travels.    In the ED, patient was hemodynamically stable.  Labs significant for transaminitis with AST/ALT at 234/280 respectively.  Bilirubin elevated at 2.8, direct bilirubin 2.0.  Chest x-ray with no acute process.  Ultrasound abdomen with normal common duct and no intrahepatic ductal dilation.  Status post cholecystectomy.  MRCP recommended, patient refused.  Does not want to obtain MRI and does not want any medications that will sedate her.  GI consulted.  Patient admitted to hospital medicine for further evaluation and management.    Past Medical History:   Diagnosis Date    Cataract     Glaucoma (increased eye pressure)     H/O bilateral oophorectomy     Hx of cholecystectomy     Hypertension     Pure hypercholesterolemia     Thalassemia        Past Surgical History:   Procedure Laterality Date    BILATERAL OOPHORECTOMY      for cysts    CATARACT EXTRACTION Right     about 1998     CHOLECYSTECTOMY  1998    NV REMOVAL OF OVARY/TUBE(S)      TRABECULECTOMY Right 6/20/2019    Procedure: G6/MP3 LASER;  Surgeon: Doroteo Beaver MD;  Location: [facility] OR [address] [place] [address];  Service: Ophthalmology;  Laterality: Right;       Review of patient's allergies indicates:  No Known Allergies    Current Facility-Administered Medications   Medication Dose Route Frequency Provider Last Rate Last Admin    0.9%  NaCl infusion   Intravenous Continuous Victoria Janina-My T.,  mL/hr at 04/30/24 0911 New Bag at 04/30/24 0911    brimonidine (ALPHAGAN P) ophthalmic solution 0.1%  1 drop Both Eyes TID Victoria Janinah-My T., DO        dextrose 10% bolus 125 mL 125 mL  12.5 g Intravenous PRN Victoria Hanh-My T., DO        dextrose 10% bolus 250 mL 250 mL  25 g Intravenous PRN Kessler, Hanh-My T., DO        dorzolamide-timolol 2-0.5% ophthalmic solution 1 drop  1 drop Both Eyes BID Victoria Hanh-My T., DO        enoxaparin injection 40 mg  40 mg Subcutaneous Daily Victoria Hanh-My T., DO        fluorometholone 0.1% ophthalmic suspension 1 drop  1 drop Both Eyes Daily Victoria Hanh-My T., DO        glucagon (human recombinant) injection 1 mg  1 mg Intramuscular PRN Victoria Hanh-My T., DO        glucose chewable tablet 16 g  16 g Oral PRN Victoria Hanh-My T., DO        glucose chewable tablet 24 g  24 g Oral PRN Victoria Hanh-My T., DO        latanoprost 0.005 % ophthalmic solution 1 drop  1 drop Both Eyes QHS Tran, Hanh-My T., DO        naloxone 0.4 mg/mL injection 0.02 mg  0.02 mg Intravenous PRN Kessler Hanh-My T., DO        sodium chloride 0.9% flush 10 mL  10 mL Intravenous Q12H PRN Victoria Hanh-My T., DO         Current Outpatient Medications   Medication Sig Dispense Refill    atenoloL-chlorthalidone (TENORETIC) 50-25 mg Tab Take 1 tablet by mouth every morning. 90 tablet 3    atorvastatin (LIPITOR) 10 MG tablet Take 1 tablet (10 mg total) by mouth once daily. 90 tablet 3    brimonidine 0.1% (ALPHAGAN P) 0.1 % Drop INSTILL ONE DROP IN BOTH EYES THREE  TIMES DAILY 45 mL 4    dorzolamide-timolol 2-0.5% (COSOPT) 22.3-6.8 mg/mL ophthalmic solution INSTILL 1 DROP IN BOTH EYES THREE TIMES DAILY 30 mL 4    fluorometholone 0.1% (FML) 0.1 % DrpS SHAKE LIQUID AND INSTILL 1 DROP IN BOTH EYES EVERY DAY 10 mL 6    latanoprost 0.005 % ophthalmic solution Place 1 drop into both eyes every evening. 7.5 mL 4    potassium chloride (KLOR-CON) 10 MEQ TbSR Take 1 tablet (10 mEq total) by mouth once daily. 90 tablet 3    UNABLE TO FIND Take 2 Pieces by mouth once daily. medication name: Willi Brain Gummie      LUTEIN ORAL Take 1 tablet by mouth every morning.       Family History       Problem Relation (Age of Onset)    Arthritis Father    Asthma Father    Breast cancer Maternal Grandmother    Hypertension Sister    No Known Problems Brother, Brother, Brother    Ovarian cancer Mother          Tobacco Use    Smoking status: Former     Current packs/day: 0.00     Average packs/day: 0.3 packs/day for 11.0 years (3.3 ttl pk-yrs)     Types: Cigarettes     Start date: 1996     Quit date: 2007     Years since quittin.7    Smokeless tobacco: Never   Substance and Sexual Activity    Alcohol use: Not Currently     Comment: social drinker    Drug use: No    Sexual activity: Yes     Partners: Male     Birth control/protection: Post-menopausal     Review of Systems   Constitutional:  Negative for chills, fatigue and fever.   HENT:  Negative for trouble swallowing.    Respiratory:  Negative for cough, chest tightness and shortness of breath.    Cardiovascular:  Negative for chest pain, palpitations and leg swelling.   Gastrointestinal:  Positive for abdominal pain. Negative for abdominal distention, blood in stool, diarrhea, nausea and vomiting.   Musculoskeletal:  Negative for back pain.   Neurological:  Negative for dizziness, weakness and headaches.   Psychiatric/Behavioral:  Negative for confusion and hallucinations.      Objective:     Vital Signs (Most Recent):  Temp: 99.7 °F  (37.6 °C) (04/30/24 0240)  Pulse: 90 (04/30/24 0734)  Resp: 20 (04/30/24 0734)  BP: 132/62 (04/30/24 0734)  SpO2: (!) 94 % (04/30/24 0724) Vital Signs (24h Range):  Temp:  [99.7 °F (37.6 °C)] 99.7 °F (37.6 °C)  Pulse:  [] 90  Resp:  [18-25] 20  SpO2:  [93 %-99 %] 94 %  BP: (110-150)/(59-72) 132/62     Weight: 72.6 kg (160 lb)  Body mass index is 29.26 kg/m².     Physical Exam  Nursing note reviewed.   Constitutional:       General: She is not in acute distress.     Appearance: She is not ill-appearing.   HENT:      Mouth/Throat:      Mouth: Mucous membranes are dry.   Eyes:      Extraocular Movements: Extraocular movements intact.   Cardiovascular:      Rate and Rhythm: Normal rate and regular rhythm.      Heart sounds: No murmur heard.  Pulmonary:      Effort: Pulmonary effort is normal. No respiratory distress.      Breath sounds: Normal breath sounds. No wheezing.   Abdominal:      General: There is no distension.      Palpations: Abdomen is soft.      Tenderness: There is no abdominal tenderness (none on my exam, resolved per patient). There is no guarding.   Musculoskeletal:         General: No swelling or tenderness.      Right lower leg: No edema.      Left lower leg: No edema.   Skin:     General: Skin is warm and dry.      Coloration: Skin is not jaundiced.   Neurological:      General: No focal deficit present.      Mental Status: She is alert and oriented to person, place, and time.   Psychiatric:         Mood and Affect: Mood normal.                Significant Labs: All pertinent labs within the past 24 hours have been reviewed.    Bilirubin:   Recent Labs   Lab 04/30/24 0241 04/30/24  1100   BILIDIR  --  2.0*   BILITOT 2.2* 2.8*  2.8*     CBC:   Recent Labs   Lab 04/30/24 0241   WBC 11.44   HGB 11.2*   HCT 37.0        CMP:   Recent Labs   Lab 04/30/24 0241 04/30/24  1100    140   K 3.1* 4.1   CL 99 101   CO2 27 28   * 106   BUN 21 18   CREATININE 1.0 1.0   CALCIUM 11.1*  10.5   PROT 8.3 7.4  7.4   ALBUMIN 4.2 3.7  3.7   BILITOT 2.2* 2.8*  2.8*   ALKPHOS 464* 421*  421*   * 234*  234*   * 280*  280*   ANIONGAP 16 11       Lipase:   Recent Labs   Lab 04/30/24  0241   LIPASE 128*     Lipid Panel:   Recent Labs   Lab 04/30/24  0308   CHOL 179   HDL 73   LDLCALC 88.4   TRIG 88   CHOLHDL 40.8       Troponin:   Recent Labs   Lab 04/30/24  0241   TROPONINI <0.006     TSH:   Recent Labs   Lab 04/30/24  0242   TSH 1.471     Urine Studies:   Recent Labs   Lab 04/30/24  0357   COLORU Yellow   APPEARANCEUA Hazy*   PHUR 8.0   SPECGRAV 1.015   PROTEINUA Negative   GLUCUA Negative   KETONESU Negative   BILIRUBINUA Negative   OCCULTUA Negative   NITRITE Negative   UROBILINOGEN 4.0-6.0*   LEUKOCYTESUR 3+*   WBCUA 37*   BACTERIA Rare   SQUAMEPITHEL 32   HYALINECASTS 1       Significant Imaging: I have reviewed all pertinent imaging results/findings within the past 24 hours.    Imaging Results              X-Ray Chest 1 View (Final result)  Result time 04/30/24 07:03:30      Final result by Arnulfo Koch MD (04/30/24 07:03:30)                   Impression:      See above      Electronically signed by: Arnulfo Koch MD  Date:    04/30/2024  Time:    07:03               Narrative:    EXAMINATION:  XR CHEST 1 VIEW    CLINICAL HISTORY:  Epigastric pain    TECHNIQUE:  Single frontal view of the chest was performed.    COMPARISON:  08/07/2013    FINDINGS:  Cardiac size is normal.  Lungs are clear and well aerated.                                       US Abdomen Limited (Final result)  Result time 04/30/24 06:23:08      Final result by Parth Ross MD (04/30/24 06:23:08)                   Impression:      1. No definite acute sonographic findings identified to account for the patient's reported symptoms.  2. Additional details, as provided in the body of the report.      Electronically signed by: Parth Ross  Date:    04/30/2024  Time:    06:23                Narrative:    EXAMINATION:  US ABDOMEN LIMITED    CLINICAL HISTORY:  RUQ;    TECHNIQUE:  Limited ultrasound of the right upper quadrant of the abdomen (including pancreas, liver, gallbladder, common bile duct, and spleen) was performed.    COMPARISON:  None.    FINDINGS:  Liver: Normal in size, measuring 12.8 cm. Fatty liver infiltration. No focal hepatic lesions.    Gallbladder: Status post cholecystectomy.    Biliary system: The common duct is not dilated, measuring 3 mm.  No intrahepatic ductal dilatation.    Spleen: Normal in size and echotexture, measuring 12.7 x 3.9 cm.    Miscellaneous: No upper abdominal ascites.  Visualized region of the pancreas grossly unremarkable.  No evidence of right-sided hydronephrosis.                                       CT Head Without Contrast (Final result)  Result time 04/30/24 02:27:34      Final result by Shan Magallon MD (04/30/24 02:27:34)                   Impression:      No acute intracranial abnormalities.    Sinus disease, as above.      Electronically signed by: Shan Magallon MD  Date:    04/30/2024  Time:    02:27               Narrative:    EXAMINATION:  CT HEAD WITHOUT CONTRAST    CLINICAL HISTORY:  Memory loss;    TECHNIQUE:  Low dose axial images were obtained through the head.  Coronal and sagittal reformations were also performed. Contrast was not administered.    COMPARISON:  None.    FINDINGS:  The brain parenchyma appears normal for age with good corticomedullary differentiation.  There is no evidence of acute infarct, hemorrhage, or mass.  There is ventricular and sulcal enlargement consistent with generalized atrophy.  Mild confluent decreased supratentorial white matter attenuation most likely related to chronic nonspecific small vessel disease.   No mass-effect or midline shift.  There are no abnormal extra-axial fluid collections.  Mucoperiosteal thickening in the sphenoid sinuses and anterior left ethmoid air cell complex.  Possible air-fluid  level left sphenoid sinus.  The remaining visualized paranasal sinuses and mastoid air cells are essentially clear .  The calvarium appears intact. Left phthisis bulbi.                                      Assessment/Plan:     * Transaminitis  -presented with right upper quadrant and epigastric abdominal pain, with no nausea or no vomiting   -AST/ALT elevated at 234/280 respectively.   -ultrasound abdomen  with normal common duct and no intrahepatic ductal dilation.    -Status post cholecystectomy in 1998.    -MRCP recommended, patient refused.  anti-anxiety medication offered. She refused anti-anxiety meds  -hepatitis panel ordered   -GI consulted: Rec autoimmune hep serologies, trend lfts and bili.     Hyperbilirubinemia  -bilirubin elevated 2.8, direct bilirubin elevated at 2.0 on admission   -LFTs elevated   -ultrasound as above   -GI consulted      Hypokalemia  Patient has hypokalemia which is Acute and currently controlled. Most recent potassium levels reviewed-   Lab Results   Component Value Date    K 4.1 04/30/2024   . Will continue potassium replacement per protocol and recheck repeat levels after replacement completed.       -K 3.1 on admit  -replace as needed     Essential hypertension  Chronic, controlled.   -hold home atenoloL-chlorthalidone at this time     Latest blood pressure and vitals reviewed-     Temp:  [99.7 °F (37.6 °C)]   Pulse:  []   Resp:  [18-25]   BP: (110-150)/(59-72)   SpO2:  [93 %-99 %] .   Home meds for hypertension were reviewed and noted below.   Hypertension Medications               atenoloL-chlorthalidone (TENORETIC) 50-25 mg Tab Take 1 tablet by mouth every morning.            While in the hospital, will manage blood pressure as follows; Adjust home antihypertensive regimen as follows- hold home BP meds at this time     Will utilize p.r.n. blood pressure medication only if patient's blood pressure greater than 180/110 and she develops symptoms such as worsening chest pain  or shortness of breath.    Dyslipidemia  -hold home statin in the setting of elevated LFTs    Overweight (BMI 25.0-29.9)  Body mass index is 29.26 kg/m².  Weight loss as outpatient  General weight loss/lifestyle modification strategies discussed (elicit support from others; identify saboteurs; non-food rewards, etc).          VTE Risk Mitigation (From admission, onward)           Ordered     enoxaparin injection 40 mg  Daily         04/30/24 1130     IP VTE HIGH RISK PATIENT  Once         04/30/24 1130     Place sequential compression device  Until discontinued         04/30/24 1130                       On 04/30/2024, patient should be placed in hospital observation services under my care.         Latest Reference Range & Units 04/30/24 03:41   POC PH 7.35 - 7.45  7.420   POC PCO2 35 - 45 mmHg 45.0   POC PO2 40 - 60 mmHg 23 (LL)   POC HCO3 24 - 28 mmol/L 29.2 (H)   POC SATURATED O2 95 - 100 % 40   Sample  VENOUS   POC TCO2 24 - 29 mmol/L 31 (H)   POC BE -2 to 2 mmol/L 4 (H)   FiO2  21   DelSys  Room Air   Site  Other   Mode  SPONT   (LL): Data is critically low  (H): Data is abnormally high    AdmissionCare    Guideline: Vomiting - OBS, Observation    Based on the indications selected for the patient, the bed status of Observation was determined to be MET    The following indications were selected as present at the time of evaluation of the patient:      - Vomiting that persists despite emergency department care    AdmissionCare documentation entered by: Wayne Jones    [facility] [employer], 27th edition, Copyright © 2023 [facility] [employer], [employer] All Rights Reserved.  [id number]:00    Mert Kessler DO  Department of Hospital Medicine  [facility] - Emergency Dept

## 2024-04-30 NOTE — ASSESSMENT & PLAN NOTE
-presented with right upper quadrant and epigastric abdominal pain, with no nausea or no vomiting   -AST/ALT elevated at 234/280 respectively.   -ultrasound abdomen  with normal common duct and no intrahepatic ductal dilation.    -Status post cholecystectomy in 1998.    -MRCP recommended, patient refused.  anti-anxiety medication offered. She refused anti-anxiety meds  -hepatitis panel ordered   -GI consulted: Rec autoimmune hep serologies, trend lfts and bili.

## 2024-04-30 NOTE — ED NOTES
Report given to LATISHA Almendarez    SSKI Counseling:  I discussed with the patient the risks of SSKI including but not limited to thyroid abnormalities, metallic taste, GI upset, fever, headache, acne, arthralgias, paraesthesias, lymphadenopathy, easy bleeding, arrhythmias, and allergic reaction.

## 2024-04-30 NOTE — CONSULTS
[facility] Consultation Note    Patient Complaint: RUQ pain    PCP:   Raul Adkins       LOS: 0        Initial History of Present Illness (HPI):  This is a 73 y.o. female consulted to GI service for transaminitis. PMH HTN, HLD, stage 3a CKD, GERD, thalassemia, glaucoma. Patient complaint of acute onset of severe RUQ abdominal pain with associated symptoms of bloating that began on yesterday. Denies dizziness, lightheadedness, confusion, jaundice, nausea, vomiting, diarrhea or constipation. Denies heavy alcohol use, smoking, overseas travel, eating raw seafood, hx of liver disease. Last colonoscopy in 2020.     Bili 2.2, lfts 238/246, lipase 12, inr normal, alk phos 464  US w/o acute process      Medical History:  has a past medical history of Cataract, Glaucoma (increased eye pressure), H/O bilateral oophorectomy, cholecystectomy, Hypertension, Pure hypercholesterolemia, and Thalassemia.    Surgical History:  has a past surgical history that includes Cholecystectomy (1998); pr removal of ovary/tube(s); Cataract extraction (Right); Bilateral oophorectomy; and Trabeculectomy (Right, 6/20/2019).      Objective Findings:    Vital Signs:  Temp:  [99.7 °F (37.6 °C)]   Pulse:  []   Resp:  [18-25]   BP: (110-150)/(59-72)   SpO2:  [93 %-99 %]   Body mass index is 29.26 kg/m².      Physical Exam  Vitals and nursing note reviewed.   Constitutional:       Appearance: Normal appearance.   HENT:      Head: Normocephalic.   Pulmonary:      Effort: Pulmonary effort is normal.   Abdominal:      General: Bowel sounds are normal.      Palpations: Abdomen is soft.   Skin:     General: Skin is warm and dry.   Neurological:      Mental Status: She is alert and oriented to person, place, and time.   Psychiatric:         Mood and Affect: Mood normal.         Behavior: Behavior normal.         Thought Content: Thought content normal.         Judgment: Judgment normal.               Labs:  Lab Results   Component  "Value Date    WBC 11.44 04/30/2024    HGB 11.2 (L) 04/30/2024    HCT 37.0 04/30/2024     04/30/2024    CHOL 179 04/30/2024    TRIG 88 04/30/2024    HDL 73 04/30/2024     (H) 04/30/2024     (H) 04/30/2024     04/30/2024    K 3.1 (L) 04/30/2024    CL 99 04/30/2024    CREATININE 1.0 04/30/2024    BUN 21 04/30/2024    CO2 27 04/30/2024    TSH 1.471 04/30/2024    INR 0.9 04/30/2024    HGBA1C 6.1 08/07/2013         Imaging: US abdomen-  Liver: Normal in size, measuring 12.8 cm. Fatty liver infiltration. No focal hepatic lesions.   Gallbladder: Status post cholecystectomy.   Biliary system: The common duct is not dilated, measuring 3 mm.  No intrahepatic ductal dilatation.   Spleen: Normal in size and echotexture, measuring 12.7 x 3.9 cm.      Endoscopy: [facility] 1/2020 egd- irregular z line and esophagitis. Stomach and duodenum normal.  1/2020 Colonoscopy- mild diverticulosis of the entire colon and grade 1 internal hemorrhoids.     I have independently reviewed and interpreted the imaging above           RLQ pain. Suspected dili. Elevated lfts. Elevated bili. Elevated alk phos.  Plan/ Recommendations:  1. Patient is s/p cholecystectomy in 1990s, a notes in care everywhere says she "still gets bile in the duct". Reports pain has subsided.  ordered MRCP. Patient refused MRCP, reports she is claustrophobic, anti-anxiety medication offered. She refused anti-anxiety meds and refused MRCP again. Reports taking 2 new herbal gummy medications for about 2 months, neuriva (brain and eye) and the other she could not remember.  Will follow acute hep panel. Rec autoimmune hep serologies, trend lfts and bili.         Thank you so much for allowing us to participate in the care of Yahaira Hinson . Please contact us if you have any additional questions.    Flor Hernandez NP  Gastroenterology  [facility] - Med Surg        "

## 2024-04-30 NOTE — ASSESSMENT & PLAN NOTE
-bilirubin elevated 2.8, direct bilirubin elevated at 2.0 on admission   -LFTs elevated   -ultrasound as above   -GI consulted

## 2024-04-30 NOTE — ED PROVIDER NOTES
Encounter Date: 2024       History     Chief Complaint   Patient presents with    Abdominal Pain     Abdominal pain, bloating, nausea, and vomitting x few hours, reports history of GERD with similar symptoms today     HPI  Review of patient's allergies indicates:  No Known Allergies  Past Medical History:   Diagnosis Date    Cataract     Glaucoma (increased eye pressure)     H/O bilateral oophorectomy     Hx of cholecystectomy     Hypertension     Pure hypercholesterolemia     Thalassemia      Past Surgical History:   Procedure Laterality Date    BILATERAL OOPHORECTOMY      for cysts    CATARACT EXTRACTION Right     about     CHOLECYSTECTOMY  1998    OK REMOVAL OF OVARY/TUBE(S)      TRABECULECTOMY Right 2019    Procedure: G6/MP3 LASER;  Surgeon: Doroteo Beaver MD;  Location: [facility] OR [address] [place] [address];  Service: Ophthalmology;  Laterality: Right;     Family History   Problem Relation Name Age of Onset    Asthma Father      Arthritis Father      Ovarian cancer Mother      Hypertension Sister      No Known Problems Brother      Breast cancer Maternal Grandmother      No Known Problems Brother      No Known Problems Brother      Colon cancer Neg Hx       Social History     Tobacco Use    Smoking status: Former     Current packs/day: 0.00     Average packs/day: 0.3 packs/day for 11.0 years (3.3 ttl pk-yrs)     Types: Cigarettes     Start date: 1996     Quit date: 2007     Years since quittin.7    Smokeless tobacco: Never   Substance Use Topics    Alcohol use: Not Currently     Comment: social drinker    Drug use: No     Review of Systems    Physical Exam     Initial Vitals   BP Pulse Resp Temp SpO2   24 0049 24 0049 24 0049 24 0240 24 0049   110/60 60 18 99.7 °F (37.6 °C) 99 %      MAP       --                Physical Exam    ED Course   Procedures  Labs Reviewed   CBC W/ AUTO DIFFERENTIAL - Abnormal; Notable for the following components:       Result Value     Hemoglobin 11.2 (*)     MCV 69 (*)     MCH 20.9 (*)     MCHC 30.3 (*)     RDW 15.9 (*)     Gran # (ANC) 10.5 (*)     Immature Grans (Abs) 0.05 (*)     Lymph # 0.5 (*)     Gran % 91.3 (*)     Lymph % 4.2 (*)     Mono % 3.8 (*)     All other components within normal limits   COMPREHENSIVE METABOLIC PANEL - Abnormal; Notable for the following components:    Potassium 3.1 (*)     Glucose 128 (*)     Calcium 11.1 (*)     Total Bilirubin 2.2 (*)     Alkaline Phosphatase 464 (*)      (*)      (*)     All other components within normal limits   LIPASE - Abnormal; Notable for the following components:    Lipase 128 (*)     All other components within normal limits   URINALYSIS, REFLEX TO URINE CULTURE - Abnormal; Notable for the following components:    Appearance, UA Hazy (*)     Urobilinogen, UA 4.0-6.0 (*)     Leukocytes, UA 3+ (*)     All other components within normal limits    Narrative:     Specimen Source->Urine   SALICYLATE LEVEL - Abnormal; Notable for the following components:    Salicylate Lvl <5.0 (*)     All other components within normal limits   ACETAMINOPHEN LEVEL - Abnormal; Notable for the following components:    Acetaminophen (Tylenol), Serum <3.0 (*)     All other components within normal limits   URINALYSIS MICROSCOPIC - Abnormal; Notable for the following components:    WBC, UA 37 (*)     Non-Squam Epith 2 (*)     All other components within normal limits    Narrative:     Specimen Source->Urine   ISTAT PROCEDURE - Abnormal; Notable for the following components:    POC PO2 23 (*)     POC HCO3 29.2 (*)     POC BE 4 (*)     POC TCO2 31 (*)     All other components within normal limits   CULTURE, URINE   TROPONIN I   TSH   AMMONIA   DRUG SCREEN PANEL, URINE EMERGENCY    Narrative:     Specimen Source->Urine   ACETAMINOPHEN LEVEL   HEPATITIS PANEL, ACUTE   HEPATITIS PANEL, ACUTE   PROTIME-INR          Imaging Results              X-Ray Chest 1 View (Final result)  Result time 04/30/24  07:03:30      Final result by Arnulfo Koch MD (04/30/24 07:03:30)                   Impression:      See above      Electronically signed by: Arnulfo Koch MD  Date:    04/30/2024  Time:    07:03               Narrative:    EXAMINATION:  XR CHEST 1 VIEW    CLINICAL HISTORY:  Epigastric pain    TECHNIQUE:  Single frontal view of the chest was performed.    COMPARISON:  08/07/2013    FINDINGS:  Cardiac size is normal.  Lungs are clear and well aerated.                                       US Abdomen Limited (Final result)  Result time 04/30/24 06:23:08      Final result by Parth Ross MD (04/30/24 06:23:08)                   Impression:      1. No definite acute sonographic findings identified to account for the patient's reported symptoms.  2. Additional details, as provided in the body of the report.      Electronically signed by: Parth Ross  Date:    04/30/2024  Time:    06:23               Narrative:    EXAMINATION:  US ABDOMEN LIMITED    CLINICAL HISTORY:  RUQ;    TECHNIQUE:  Limited ultrasound of the right upper quadrant of the abdomen (including pancreas, liver, gallbladder, common bile duct, and spleen) was performed.    COMPARISON:  None.    FINDINGS:  Liver: Normal in size, measuring 12.8 cm. Fatty liver infiltration. No focal hepatic lesions.    Gallbladder: Status post cholecystectomy.    Biliary system: The common duct is not dilated, measuring 3 mm.  No intrahepatic ductal dilatation.    Spleen: Normal in size and echotexture, measuring 12.7 x 3.9 cm.    Miscellaneous: No upper abdominal ascites.  Visualized region of the pancreas grossly unremarkable.  No evidence of right-sided hydronephrosis.                                       CT Head Without Contrast (Final result)  Result time 04/30/24 02:27:34      Final result by Shan Magallon MD (04/30/24 02:27:34)                   Impression:      No acute intracranial abnormalities.    Sinus disease, as  above.      Electronically signed by: Shan Magallon MD  Date:    04/30/2024  Time:    02:27               Narrative:    EXAMINATION:  CT HEAD WITHOUT CONTRAST    CLINICAL HISTORY:  Memory loss;    TECHNIQUE:  Low dose axial images were obtained through the head.  Coronal and sagittal reformations were also performed. Contrast was not administered.    COMPARISON:  None.    FINDINGS:  The brain parenchyma appears normal for age with good corticomedullary differentiation.  There is no evidence of acute infarct, hemorrhage, or mass.  There is ventricular and sulcal enlargement consistent with generalized atrophy.  Mild confluent decreased supratentorial white matter attenuation most likely related to chronic nonspecific small vessel disease.   No mass-effect or midline shift.  There are no abnormal extra-axial fluid collections.  Mucoperiosteal thickening in the sphenoid sinuses and anterior left ethmoid air cell complex.  Possible air-fluid level left sphenoid sinus.  The remaining visualized paranasal sinuses and mastoid air cells are essentially clear .  The calvarium appears intact. Left phthisis bulbi.                                       Medications   potassium bicarbonate disintegrating tablet 40 mEq (40 mEq Oral Given 4/30/24 5777)   ondansetron injection 4 mg (4 mg Intravenous Given 4/30/24 8932)     Medical Decision Making      73-year-old female presenting with epigastric abdominal pain.  History of previous cholecystectomy.  Transaminitis and hyperbilirubinemia 2.2 noted on chemistry panel.  Patient denies any alcohol use.  Denies any Tylenol use.  The patient's symptoms did resolve throughout the ER stay.  Patient had no abdominal tenderness on my re-evaluation.  Ultrasound shows no biliary duct dilation.      Consultation with Gastroenterology, Dr. Latham.  Recommends MRCP in observation for further evaluation by Gastroenterology team.      Differential diagnosis includes biliary duct obstruction,  pancreatitis    ECG:  Please check workup area for ECG interpretation.        Amount and/or Complexity of Data Reviewed  Labs: ordered. Decision-making details documented in ED Course.  Radiology: ordered.  ECG/medicine tests:  Decision-making details documented in ED Course.    Risk  Prescription drug management.               ED Course as of 04/30/24 0713 Tue Apr 30, 2024   0300 Patient handoff from Dr. Finley to Dr. Harris.  [JM]   0409 EKG 12-lead  Time 3:00 a.m.     Rate 85, sinus, regular rhythm, normal axis.   QRS 70 .  No ST elevation or depression .  T-wave inversion V2.  No hyperacute T-waves.      Normal sinus rhythm nonspecific T-wave inversion. [JM]   0410 BILIRUBIN TOTAL(!): 2.2 [JM]   0410 ALP(!): 464 [JM]   0410 AST(!): 238 [JM]   0410 ALT(!): 246 [JM]   0418 History of cholecystectomy. [JM]   0542 Acetaminophen Level(!): <3.0  Patient is pain-free. [JM]   0632 Gastroenterology consult, Dr. Latham.     Recommends MRCP. GI consult and can get seen.  [JM]      ED Course User Index  [JM] Karlos Harris MD                           Clinical Impression:  Final diagnoses:  [R11.2] Nausea and vomiting  [R10.13] Epigastric abdominal pain  [R74.01] Transaminitis (Primary)  [E80.6] Hyperbilirubinemia                 Karlos Harris MD  04/30/24 0728

## 2024-04-30 NOTE — ASSESSMENT & PLAN NOTE
Patient has hypokalemia which is Acute and currently controlled. Most recent potassium levels reviewed-   Lab Results   Component Value Date    K 4.1 04/30/2024   . Will continue potassium replacement per protocol and recheck repeat levels after replacement completed.       -K 3.1 on admit  -replace as needed

## 2024-04-30 NOTE — HPI
73-year-old female with past medical history of hypertension and hyperlipidemia presented to the ED with complaints of right upper quadrant and epigastric abdominal pain that started 1 day ago.  Denies any nausea or vomiting.  Describes the pain as a sharp intermittent pain, with no radiation.  Denies any difficulty urinating, dysuria, or any increasing urinary urgency and frequency.  No smoking or alcohol use.  Denies any new medications or recent travels.    In the ED, patient was hemodynamically stable.  Labs significant for transaminitis with AST/ALT at 234/280 respectively.  Bilirubin elevated at 2.8, direct bilirubin 2.0.  Chest x-ray with no acute process.  Ultrasound abdomen with normal common duct and no intrahepatic ductal dilation.  Status post cholecystectomy.  MRCP recommended, patient refused.  Does not want to obtain MRI and does not want any medications that will sedate her.  GI consulted.  Patient admitted to hospital medicine for further evaluation and management.

## 2024-04-30 NOTE — PLAN OF CARE
04/30/24 1348   Discharge Planning   Assessment Type Discharge Planning Brief Assessment   Resource/Environmental Concerns none   Support Systems Spouse/significant other   Equipment Currently Used at Home none   Current Living Arrangements home   Patient/Family Anticipates Transition to home with family   Patient/Family Anticipated Services at Transition none   DME Needed Upon Discharge  none   Discharge Plan A Home with family  (with scheduled follow up appointments)       [id number] - [address] [place] [facility] [address]   [place] [address]  Phone: [phone] Fax: [phone]

## 2024-04-30 NOTE — ASSESSMENT & PLAN NOTE
Chronic, controlled.   -hold home atenoloL-chlorthalidone at this time     Latest blood pressure and vitals reviewed-     Temp:  [99.7 °F (37.6 °C)]   Pulse:  []   Resp:  [18-25]   BP: (110-150)/(59-72)   SpO2:  [93 %-99 %] .   Home meds for hypertension were reviewed and noted below.   Hypertension Medications               atenoloL-chlorthalidone (TENORETIC) 50-25 mg Tab Take 1 tablet by mouth every morning.            While in the hospital, will manage blood pressure as follows; Adjust home antihypertensive regimen as follows- hold home BP meds at this time     Will utilize p.r.n. blood pressure medication only if patient's blood pressure greater than 180/110 and she develops symptoms such as worsening chest pain or shortness of breath.

## 2024-04-30 NOTE — ED NOTES
"Report received from LATISHA Valenzuela. Pt in no distress at present. Denies any abd pain or nausea at present. Pt states "I feel a lot better." Spouse at bedside. Updated on POC. Will continue to monitor.     "

## 2024-04-30 NOTE — ASSESSMENT & PLAN NOTE
Body mass index is 29.26 kg/m².  Weight loss as outpatient  General weight loss/lifestyle modification strategies discussed (elicit support from others; identify saboteurs; non-food rewards, etc).

## 2024-04-30 NOTE — PROGRESS NOTES
Latest Reference Range & Units 04/30/24 03:41   POC PH 7.35 - 7.45  7.420   POC PCO2 35 - 45 mmHg 45.0   POC PO2 40 - 60 mmHg 23 (LL)   POC HCO3 24 - 28 mmol/L 29.2 (H)   POC SATURATED O2 95 - 100 % 40   Sample  VENOUS   POC TCO2 24 - 29 mmol/L 31 (H)   POC BE -2 to 2 mmol/L 4 (H)   FiO2  21   DelSys  Room Air   Site  Other   Mode  SPONT   (LL): Data is critically low  (H): Data is abnormally high

## 2024-04-30 NOTE — ADMISSIONCARE
AdmissionCare    Guideline: Vomiting - OBS, Observation    Based on the indications selected for the patient, the bed status of Observation was determined to be MET    The following indications were selected as present at the time of evaluation of the patient:      - Vomiting that persists despite emergency department care    AdmissionCare documentation entered by: Wayne Jones    [facility], 27th edition, Copyright © 2023 [facility], [facility] All Rights Reserved.  [id number]:00

## 2024-04-30 NOTE — SUBJECTIVE & OBJECTIVE
Past Medical History:   Diagnosis Date    Cataract     Glaucoma (increased eye pressure)     H/O bilateral oophorectomy     Hx of cholecystectomy     Hypertension     Pure hypercholesterolemia     Thalassemia        Past Surgical History:   Procedure Laterality Date    BILATERAL OOPHORECTOMY      for cysts    CATARACT EXTRACTION Right     about 1998    CHOLECYSTECTOMY  1998    [place] REMOVAL OF OVARY/TUBE(S)      TRABECULECTOMY Right 6/20/2019    Procedure: G6/MP3 LASER;  Surgeon: Doroteo Beaver MD;  Location: [facility] OR [address] [place] [address];  Service: Ophthalmology;  Laterality: Right;       Review of patient's allergies indicates:  No Known Allergies    Current Facility-Administered Medications   Medication Dose Route Frequency Provider Last Rate Last Admin    0.9%  NaCl infusion   Intravenous Continuous Mert Kessler.,  mL/hr at 04/30/24 0911 New Bag at 04/30/24 0911    brimonidine (ALPHAGAN P) ophthalmic solution 0.1%  1 drop Both Eyes TID Mert Kessler T., DO        dextrose 10% bolus 125 mL 125 mL  12.5 g Intravenous PRN Janina Kessler-My T., DO        dextrose 10% bolus 250 mL 250 mL  25 g Intravenous PRN Victoria Hanh-My T., DO        dorzolamide-timolol 2-0.5% ophthalmic solution 1 drop  1 drop Both Eyes BID Janina Kessler-My T., DO        enoxaparin injection 40 mg  40 mg Subcutaneous Daily Janina Kessler-My MALINI., DO        fluorometholone 0.1% ophthalmic suspension 1 drop  1 drop Both Eyes Daily Rosa KesslerMy T., DO        glucagon (human recombinant) injection 1 mg  1 mg Intramuscular PRN Janina Kessler-My T., DO        glucose chewable tablet 16 g  16 g Oral PRN Rosa KesslerMy T., DO        glucose chewable tablet 24 g  24 g Oral PRN Victoria Janina-My T., DO        latanoprost 0.005 % ophthalmic solution 1 drop  1 drop Both Eyes QHS Rosa KesslerMy MALINI., DO        naloxone 0.4 mg/mL injection 0.02 mg  0.02 mg Intravenous PRN Janina Kessler-My T., DO        sodium chloride 0.9% flush 10 mL  10 mL Intravenous Q12H PRN Mert Kessler T., DO          Current Outpatient Medications   Medication Sig Dispense Refill    atenoloL-chlorthalidone (TENORETIC) 50-25 mg Tab Take 1 tablet by mouth every morning. 90 tablet 3    atorvastatin (LIPITOR) 10 MG tablet Take 1 tablet (10 mg total) by mouth once daily. 90 tablet 3    brimonidine 0.1% (ALPHAGAN P) 0.1 % Drop INSTILL ONE DROP IN BOTH EYES THREE TIMES DAILY 45 mL 4    dorzolamide-timolol 2-0.5% (COSOPT) 22.3-6.8 mg/mL ophthalmic solution INSTILL 1 DROP IN BOTH EYES THREE TIMES DAILY 30 mL 4    fluorometholone 0.1% (FML) 0.1 % DrpS SHAKE LIQUID AND INSTILL 1 DROP IN BOTH EYES EVERY DAY 10 mL 6    latanoprost 0.005 % ophthalmic solution Place 1 drop into both eyes every evening. 7.5 mL 4    potassium chloride (KLOR-CON) 10 MEQ TbSR Take 1 tablet (10 mEq total) by mouth once daily. 90 tablet 3    UNABLE TO FIND Take 2 Pieces by mouth once daily. medication name: Willi Brain Gummie      LUTEIN ORAL Take 1 tablet by mouth every morning.       Family History       Problem Relation (Age of Onset)    Arthritis Father    Asthma Father    Breast cancer Maternal Grandmother    Hypertension Sister    No Known Problems Brother, Brother, Brother    Ovarian cancer Mother          Tobacco Use    Smoking status: Former     Current packs/day: 0.00     Average packs/day: 0.3 packs/day for 11.0 years (3.3 ttl pk-yrs)     Types: Cigarettes     Start date: 1996     Quit date: 2007     Years since quittin.7    Smokeless tobacco: Never   Substance and Sexual Activity    Alcohol use: Not Currently     Comment: social drinker    Drug use: No    Sexual activity: Yes     Partners: Male     Birth control/protection: Post-menopausal     Review of Systems   Constitutional:  Negative for chills, fatigue and fever.   HENT:  Negative for trouble swallowing.    Respiratory:  Negative for cough, chest tightness and shortness of breath.    Cardiovascular:  Negative for chest pain, palpitations and leg swelling.   Gastrointestinal:   Positive for abdominal pain. Negative for abdominal distention, blood in stool, diarrhea, nausea and vomiting.   Musculoskeletal:  Negative for back pain.   Neurological:  Negative for dizziness, weakness and headaches.   Psychiatric/Behavioral:  Negative for confusion and hallucinations.      Objective:     Vital Signs (Most Recent):  Temp: 99.7 °F (37.6 °C) (04/30/24 0240)  Pulse: 90 (04/30/24 0734)  Resp: 20 (04/30/24 0734)  BP: 132/62 (04/30/24 0734)  SpO2: (!) 94 % (04/30/24 0724) Vital Signs (24h Range):  Temp:  [99.7 °F (37.6 °C)] 99.7 °F (37.6 °C)  Pulse:  [] 90  Resp:  [18-25] 20  SpO2:  [93 %-99 %] 94 %  BP: (110-150)/(59-72) 132/62     Weight: 72.6 kg (160 lb)  Body mass index is 29.26 kg/m².     Physical Exam  Nursing note reviewed.   Constitutional:       General: She is not in acute distress.     Appearance: She is not ill-appearing.   HENT:      Mouth/Throat:      Mouth: Mucous membranes are dry.   Eyes:      Extraocular Movements: Extraocular movements intact.   Cardiovascular:      Rate and Rhythm: Normal rate and regular rhythm.      Heart sounds: No murmur heard.  Pulmonary:      Effort: Pulmonary effort is normal. No respiratory distress.      Breath sounds: Normal breath sounds. No wheezing.   Abdominal:      General: There is no distension.      Palpations: Abdomen is soft.      Tenderness: There is no abdominal tenderness (none on my exam, resolved per patient). There is no guarding.   Musculoskeletal:         General: No swelling or tenderness.      Right lower leg: No edema.      Left lower leg: No edema.   Skin:     General: Skin is warm and dry.      Coloration: Skin is not jaundiced.   Neurological:      General: No focal deficit present.      Mental Status: She is alert and oriented to person, place, and time.   Psychiatric:         Mood and Affect: Mood normal.                Significant Labs: All pertinent labs within the past 24 hours have been reviewed.    Bilirubin:   Recent  Labs   Lab 04/30/24  0241 04/30/24  1100   BILIDIR  --  2.0*   BILITOT 2.2* 2.8*  2.8*     CBC:   Recent Labs   Lab 04/30/24  0241   WBC 11.44   HGB 11.2*   HCT 37.0        CMP:   Recent Labs   Lab 04/30/24  0241 04/30/24  1100    140   K 3.1* 4.1   CL 99 101   CO2 27 28   * 106   BUN 21 18   CREATININE 1.0 1.0   CALCIUM 11.1* 10.5   PROT 8.3 7.4  7.4   ALBUMIN 4.2 3.7  3.7   BILITOT 2.2* 2.8*  2.8*   ALKPHOS 464* 421*  421*   * 234*  234*   * 280*  280*   ANIONGAP 16 11       Lipase:   Recent Labs   Lab 04/30/24  0241   LIPASE 128*     Lipid Panel:   Recent Labs   Lab 04/30/24  0308   CHOL 179   HDL 73   LDLCALC 88.4   TRIG 88   CHOLHDL 40.8       Troponin:   Recent Labs   Lab 04/30/24  0241   TROPONINI <0.006     TSH:   Recent Labs   Lab 04/30/24  0242   TSH 1.471     Urine Studies:   Recent Labs   Lab 04/30/24  0357   COLORU Yellow   APPEARANCEUA Hazy*   PHUR 8.0   SPECGRAV 1.015   PROTEINUA Negative   GLUCUA Negative   KETONESU Negative   BILIRUBINUA Negative   OCCULTUA Negative   NITRITE Negative   UROBILINOGEN 4.0-6.0*   LEUKOCYTESUR 3+*   WBCUA 37*   BACTERIA Rare   SQUAMEPITHEL 32   HYALINECASTS 1       Significant Imaging: I have reviewed all pertinent imaging results/findings within the past 24 hours.    Imaging Results              X-Ray Chest 1 View (Final result)  Result time 04/30/24 07:03:30      Final result by Arnulfo Koch MD (04/30/24 07:03:30)                   Impression:      See above      Electronically signed by: Arnulfo Koch MD  Date:    04/30/2024  Time:    07:03               Narrative:    EXAMINATION:  XR CHEST 1 VIEW    CLINICAL HISTORY:  Epigastric pain    TECHNIQUE:  Single frontal view of the chest was performed.    COMPARISON:  08/07/2013    FINDINGS:  Cardiac size is normal.  Lungs are clear and well aerated.                                       US Abdomen Limited (Final result)  Result time 04/30/24 06:23:08      Final result  by Parth Ross MD (04/30/24 06:23:08)                   Impression:      1. No definite acute sonographic findings identified to account for the patient's reported symptoms.  2. Additional details, as provided in the body of the report.      Electronically signed by: Parth Ross  Date:    04/30/2024  Time:    06:23               Narrative:    EXAMINATION:  US ABDOMEN LIMITED    CLINICAL HISTORY:  RUQ;    TECHNIQUE:  Limited ultrasound of the right upper quadrant of the abdomen (including pancreas, liver, gallbladder, common bile duct, and spleen) was performed.    COMPARISON:  None.    FINDINGS:  Liver: Normal in size, measuring 12.8 cm. Fatty liver infiltration. No focal hepatic lesions.    Gallbladder: Status post cholecystectomy.    Biliary system: The common duct is not dilated, measuring 3 mm.  No intrahepatic ductal dilatation.    Spleen: Normal in size and echotexture, measuring 12.7 x 3.9 cm.    Miscellaneous: No upper abdominal ascites.  Visualized region of the pancreas grossly unremarkable.  No evidence of right-sided hydronephrosis.                                       CT Head Without Contrast (Final result)  Result time 04/30/24 02:27:34      Final result by Shan Magallon MD (04/30/24 02:27:34)                   Impression:      No acute intracranial abnormalities.    Sinus disease, as above.      Electronically signed by: Shan Magallon MD  Date:    04/30/2024  Time:    02:27               Narrative:    EXAMINATION:  CT HEAD WITHOUT CONTRAST    CLINICAL HISTORY:  Memory loss;    TECHNIQUE:  Low dose axial images were obtained through the head.  Coronal and sagittal reformations were also performed. Contrast was not administered.    COMPARISON:  None.    FINDINGS:  The brain parenchyma appears normal for age with good corticomedullary differentiation.  There is no evidence of acute infarct, hemorrhage, or mass.  There is ventricular and sulcal enlargement consistent with generalized  atrophy.  Mild confluent decreased supratentorial white matter attenuation most likely related to chronic nonspecific small vessel disease.   No mass-effect or midline shift.  There are no abnormal extra-axial fluid collections.  Mucoperiosteal thickening in the sphenoid sinuses and anterior left ethmoid air cell complex.  Possible air-fluid level left sphenoid sinus.  The remaining visualized paranasal sinuses and mastoid air cells are essentially clear .  The calvarium appears intact. Left phthisis bulbi.

## 2024-04-30 NOTE — ED PROVIDER NOTES
Encounter Date: 4/30/2024    SCRIBE #1 NOTE: I, Linda Jasso, am scribing for, and in the presence of,  Enrico Finley MD.       History     Chief Complaint   Patient presents with    Abdominal Pain     Abdominal pain, bloating, nausea, and vomitting x few hours, reports history of GERD with similar symptoms today     71 y/o female with multiple medical problems including HTN, HLD, stage 3a CKD, GERD, thalassemia, glaucoma, among others, presents to the ED bib EMS with acute abdominal bloating and generalized abdominal pain tonight.  at bedside reports similar episodes, although not as severe, in the last few weeks. Reports decreased appetite in the last day. Reports nausea and vomiting upon arrival to the ED. She reports all symptoms other than bloating have resolved upon EMS arrival. She reports hard stools with recent bowel movements recently. Denies bloody stools, melena, CP, SOB, dysuria, hematuria, increased urinary frequency, diarrhea or other symptoms.      reports memory problems in the last month. He denies any prior evaluation for this problem.     Reports PSHx of cholecystectomy about 20-30 years ago. Reports last colonoscopy was about 2 years ago. She denies EtOH or cigarette use.        The history is provided by the patient and the spouse. No  was used.     Review of patient's allergies indicates:  No Known Allergies  Past Medical History:   Diagnosis Date    Cataract     Glaucoma (increased eye pressure)     H/O bilateral oophorectomy     Hx of cholecystectomy     Hypertension     Pure hypercholesterolemia     Thalassemia      Past Surgical History:   Procedure Laterality Date    BILATERAL OOPHORECTOMY      for cysts    CATARACT EXTRACTION Right     about 1998    CHOLECYSTECTOMY  1998    OK REMOVAL OF OVARY/TUBE(S)      TRABECULECTOMY Right 6/20/2019    Procedure: G6/MP3 LASER;  Surgeon: Doroteo Beaver MD;  Location: [facility] OR [address] [place] [address];  Service: Ophthalmology;   Laterality: Right;     Family History   Problem Relation Name Age of Onset    Asthma Father      Arthritis Father      Ovarian cancer Mother      Hypertension Sister      No Known Problems Brother      Breast cancer Maternal Grandmother      No Known Problems Brother      No Known Problems Brother      Colon cancer Neg Hx       Social History     Tobacco Use    Smoking status: Former     Current packs/day: 0.00     Average packs/day: 0.3 packs/day for 11.0 years (3.3 ttl pk-yrs)     Types: Cigarettes     Start date: 1996     Quit date: 2007     Years since quittin.7    Smokeless tobacco: Never   Substance Use Topics    Alcohol use: Not Currently     Comment: social drinker    Drug use: No     Review of Systems   Constitutional:  Negative for fever.   Respiratory:  Negative for shortness of breath.    Cardiovascular:  Negative for chest pain.   Gastrointestinal:  Positive for abdominal distention, abdominal pain, nausea and vomiting. Negative for blood in stool, constipation and diarrhea.   Genitourinary:  Negative for dysuria, frequency and hematuria.   Neurological:  Negative for dizziness.   All other systems reviewed and are negative.      Physical Exam     Initial Vitals   BP Pulse Resp Temp SpO2   24 0049 24 0049 24 0049 24 0240 24 0049   110/60 60 18 99.7 °F (37.6 °C) 99 %      MAP       --                Physical Exam    Nursing note and vitals reviewed.  Constitutional: She appears well-developed and well-nourished. She is not diaphoretic. She is cooperative.  Non-toxic appearance. No distress.   HENT:   Head: Normocephalic and atraumatic.   Mouth/Throat: Oropharynx is clear and moist.   Eyes: Conjunctivae are normal.   Neck: Phonation normal. Neck supple. No stridor present.   Normal range of motion.  Cardiovascular:  Normal rate, regular rhythm and intact distal pulses.           Pulmonary/Chest: Effort normal and breath sounds normal. No accessory muscle usage  or stridor. No tachypnea. No respiratory distress. She has no wheezes. She has no rhonchi. She has no rales.   Abdominal: Bowel sounds are normal. She exhibits distension. There is no abdominal tenderness.   Musculoskeletal:         General: No tenderness. Normal range of motion.      Cervical back: Normal range of motion and neck supple.     Neurological: She is alert and oriented to person, place, and time. She has normal strength. Gait normal.   Skin: No rash noted.   Psychiatric: She has a normal mood and affect.         ED Course   Procedures  Labs Reviewed   CBC W/ AUTO DIFFERENTIAL - Abnormal; Notable for the following components:       Result Value    Hemoglobin 11.2 (*)     MCV 69 (*)     MCH 20.9 (*)     MCHC 30.3 (*)     RDW 15.9 (*)     Gran # (ANC) 10.5 (*)     Immature Grans (Abs) 0.05 (*)     Lymph # 0.5 (*)     Gran % 91.3 (*)     Lymph % 4.2 (*)     Mono % 3.8 (*)     All other components within normal limits   COMPREHENSIVE METABOLIC PANEL - Abnormal; Notable for the following components:    Potassium 3.1 (*)     Glucose 128 (*)     Calcium 11.1 (*)     Total Bilirubin 2.2 (*)     Alkaline Phosphatase 464 (*)      (*)      (*)     All other components within normal limits   LIPASE - Abnormal; Notable for the following components:    Lipase 128 (*)     All other components within normal limits   URINALYSIS, REFLEX TO URINE CULTURE - Abnormal; Notable for the following components:    Appearance, UA Hazy (*)     Urobilinogen, UA 4.0-6.0 (*)     Leukocytes, UA 3+ (*)     All other components within normal limits    Narrative:     Specimen Source->Urine   SALICYLATE LEVEL - Abnormal; Notable for the following components:    Salicylate Lvl <5.0 (*)     All other components within normal limits   ACETAMINOPHEN LEVEL - Abnormal; Notable for the following components:    Acetaminophen (Tylenol), Serum <3.0 (*)     All other components within normal limits   URINALYSIS MICROSCOPIC - Abnormal;  Notable for the following components:    WBC, UA 37 (*)     Non-Squam Epith 2 (*)     All other components within normal limits    Narrative:     Specimen Source->Urine   IRON AND TIBC - Abnormal; Notable for the following components:    Saturated Iron 14 (*)     All other components within normal limits   HEPATIC FUNCTION PANEL - Abnormal; Notable for the following components:    Total Bilirubin 2.8 (*)     Bilirubin, Direct 2.0 (*)      (*)      (*)     Alkaline Phosphatase 421 (*)     All other components within normal limits    Narrative:     Release to patient->Immediate  Collection has been rescheduled by CPD at 04/30/2024 11:02 Reason: Pt   not back yet from [facility]   COMPREHENSIVE METABOLIC PANEL - Abnormal; Notable for the following components:    Total Bilirubin 2.8 (*)     Alkaline Phosphatase 421 (*)      (*)      (*)     All other components within normal limits    Narrative:     Release to patient->Immediate  Collection has been rescheduled by CPD at 04/30/2024 11:02 Reason: Pt   not back yet from [facility]   FERRITIN - Abnormal; Notable for the following components:    Ferritin 703 (*)     All other components within normal limits    Narrative:     Release to patient->Immediate  Collection has been rescheduled by CPD at 04/30/2024 11:02 Reason: Pt   not back yet from [facility]   ISTAT PROCEDURE - Abnormal; Notable for the following components:    POC PO2 23 (*)     POC HCO3 29.2 (*)     POC BE 4 (*)     POC TCO2 31 (*)     All other components within normal limits   CULTURE, URINE   TROPONIN I   TSH   AMMONIA   DRUG SCREEN PANEL, URINE EMERGENCY    Narrative:     Specimen Source->Urine   ACETAMINOPHEN LEVEL   HEPATITIS PANEL, ACUTE   HEPATITIS PANEL, ACUTE   PROTIME-INR   IRON AND TIBC   LIPID PANEL   LIPID PANEL   CERULOPLASMIN    Narrative:     Release to patient->Immediate  Collection has been rescheduled by CPD at 04/30/2024 11:02 Reason: Pt   not back yet from  nuclear med   HEPATITIS C ANTIBODY    Narrative:     Release to patient->Immediate  Collection has been rescheduled by CPD at 04/30/2024 11:02 Reason: Pt   not back yet from nuclear med   HEPATITIS B SURFACE ANTIGEN    Narrative:     Release to patient->Immediate  Collection has been rescheduled by CPD at 04/30/2024 11:02 Reason: Pt   not back yet from nuclear med   HEPATITIS A ANTIBODY, IGM    Narrative:     Release to patient->Immediate  Collection has been rescheduled by CPD at 04/30/2024 11:02 Reason: Pt   not back yet from nuclear med   BILIRUBIN, DIRECT   DANIEL PROFILE I (SCREEN) W/ REFLEX    Narrative:     Collection has been rescheduled by CPD at 04/30/2024 11:02 Reason: Pt   not back yet from nuclear med   ALPHA 1 ANTITRYPSIN DEFIICIENCY PROFILE    Narrative:     Collection has been rescheduled by CPD at 04/30/2024 11:02 Reason: Pt   not back yet from nuclear med   ANTIMITOCHONDRIAL ANTIBODY    Narrative:     Collection has been rescheduled by CPD at 04/30/2024 11:02 Reason: Pt   not back yet from nuclear med   ANTI-SMOOTH MUSCLE ANTIBODY    Narrative:     Collection has been rescheduled by CPD at 04/30/2024 11:02 Reason: Pt   not back yet from nuclear med   PHOSPHATIDYLETHANOL (PETH)    Narrative:     Collection has been rescheduled by CPD at 04/30/2024 11:02 Reason: Pt   not back yet from nuclear med   EBV CSF, QUANTITATIVE, BY PCR   CMV DNA, QUANTITATIVE, PCR    Narrative:     Collection has been rescheduled by CPD at 04/30/2024 11:02 Reason: Pt   not back yet from nuclear med  Collection has been rescheduled by CPD at 04/30/2024 11:02 Reason: Pt   not back yet from [facility]   HERPES SIMPLEX VIRUS (HSV) TYPE 1 & 2 DNA BY PCR    Narrative:     Collection has been rescheduled by CPD at 04/30/2024 11:02 Reason: Pt   not back yet from nuclear med        ECG Results              EKG 12-lead (Final result)  Result time 04/30/24 16:47:05      Final result by Unknown User (04/30/24 16:47:05)                                       Imaging Results              X-Ray Chest 1 View (Final result)  Result time 04/30/24 07:03:30      Final result by Arnulfo Koch MD (04/30/24 07:03:30)                   Impression:      See above      Electronically signed by: Arnulfo Koch MD  Date:    04/30/2024  Time:    07:03               Narrative:    EXAMINATION:  XR CHEST 1 VIEW    CLINICAL HISTORY:  Epigastric pain    TECHNIQUE:  Single frontal view of the chest was performed.    COMPARISON:  08/07/2013    FINDINGS:  Cardiac size is normal.  Lungs are clear and well aerated.                                       US Abdomen Limited (Final result)  Result time 04/30/24 06:23:08      Final result by Parth Ross MD (04/30/24 06:23:08)                   Impression:      1. No definite acute sonographic findings identified to account for the patient's reported symptoms.  2. Additional details, as provided in the body of the report.      Electronically signed by: Parth Ross  Date:    04/30/2024  Time:    06:23               Narrative:    EXAMINATION:  US ABDOMEN LIMITED    CLINICAL HISTORY:  RUQ;    TECHNIQUE:  Limited ultrasound of the right upper quadrant of the abdomen (including pancreas, liver, gallbladder, common bile duct, and spleen) was performed.    COMPARISON:  None.    FINDINGS:  Liver: Normal in size, measuring 12.8 cm. Fatty liver infiltration. No focal hepatic lesions.    Gallbladder: Status post cholecystectomy.    Biliary system: The common duct is not dilated, measuring 3 mm.  No intrahepatic ductal dilatation.    Spleen: Normal in size and echotexture, measuring 12.7 x 3.9 cm.    Miscellaneous: No upper abdominal ascites.  Visualized region of the pancreas grossly unremarkable.  No evidence of right-sided hydronephrosis.                                       CT Head Without Contrast (Final result)  Result time 04/30/24 02:27:34      Final result by Shan Magallon MD (04/30/24 02:27:34)                    Impression:      No acute intracranial abnormalities.    Sinus disease, as above.      Electronically signed by: Shan Magallon MD  Date:    04/30/2024  Time:    02:27               Narrative:    EXAMINATION:  CT HEAD WITHOUT CONTRAST    CLINICAL HISTORY:  Memory loss;    TECHNIQUE:  Low dose axial images were obtained through the head.  Coronal and sagittal reformations were also performed. Contrast was not administered.    COMPARISON:  None.    FINDINGS:  The brain parenchyma appears normal for age with good corticomedullary differentiation.  There is no evidence of acute infarct, hemorrhage, or mass.  There is ventricular and sulcal enlargement consistent with generalized atrophy.  Mild confluent decreased supratentorial white matter attenuation most likely related to chronic nonspecific small vessel disease.   No mass-effect or midline shift.  There are no abnormal extra-axial fluid collections.  Mucoperiosteal thickening in the sphenoid sinuses and anterior left ethmoid air cell complex.  Possible air-fluid level left sphenoid sinus.  The remaining visualized paranasal sinuses and mastoid air cells are essentially clear .  The calvarium appears intact. Left phthisis bulbi.                                       Medications   0.9%  NaCl infusion ( Intravenous New Bag 4/30/24 2019)   brimonidine (ALPHAGAN P) ophthalmic solution 0.1% (1 drop Both Eyes Given 4/30/24 2115)   dorzolamide-timolol 2-0.5% ophthalmic solution 1 drop (1 drop Both Eyes Given 4/30/24 2115)   fluorometholone 0.1% ophthalmic suspension 1 drop (1 drop Both Eyes Given 4/30/24 1307)   latanoprost 0.005 % ophthalmic solution 1 drop (has no administration in time range)   sodium chloride 0.9% flush 10 mL (has no administration in time range)   naloxone 0.4 mg/mL injection 0.02 mg (has no administration in time range)   glucose chewable tablet 16 g (has no administration in time range)   glucose chewable tablet 24 g (has no administration in  time range)   glucagon (human recombinant) injection 1 mg (has no administration in time range)   enoxaparin injection 40 mg (40 mg Subcutaneous Given 4/30/24 1603)   dextrose 10% bolus 125 mL 125 mL (has no administration in time range)   dextrose 10% bolus 250 mL 250 mL (has no administration in time range)   potassium bicarbonate disintegrating tablet 40 mEq (40 mEq Oral Given 4/30/24 0451)   ondansetron injection 4 mg (4 mg Intravenous Given 4/30/24 0642)     Medical Decision Making  Amount and/or Complexity of Data Reviewed  Independent Historian: spouse     Details: See HPI.   Labs: ordered. Decision-making details documented in ED Course.  Radiology: ordered.  ECG/medicine tests:  Decision-making details documented in ED Course.    Risk  OTC drugs.  Prescription drug management.            Scribe Attestation:   Scribe #1: I performed the above scribed service and the documentation accurately describes the services I performed. I attest to the accuracy of the note.        ED Course as of 04/30/24 2148 Tue Apr 30, 2024   0300 Patient handoff from Dr. Finley to Dr. Harris.  [JM]   0409 EKG 12-lead  Time 3:00 a.m.     Rate 85, sinus, regular rhythm, normal axis.   QRS 70 .  No ST elevation or depression .  T-wave inversion V2.  No hyperacute T-waves.      Normal sinus rhythm nonspecific T-wave inversion. [JM]   0410 BILIRUBIN TOTAL(!): 2.2 [JM]   0410 ALP(!): 464 [JM]   0410 AST(!): 238 [JM]   0410 ALT(!): 246 [JM]   0418 History of cholecystectomy. [JM]   0542 Acetaminophen Level(!): <3.0  Patient is pain-free. [JM]   0632 Gastroenterology consult, Dr. Latham.     Recommends MRCP. GI consult and can get seen.  [JM]      ED Course User Index  [JM] Karlos Harris MD          Labs Reviewed      Admission on 04/30/2024   Component Date Value Ref Range Status    WBC 04/30/2024 11.44  3.90 - 12.70 K/uL Final    RBC 04/30/2024 5.35  4.00 - 5.40 M/uL Final    Hemoglobin 04/30/2024 11.2 (L)  12.0 - 16.0  g/dL Final    Hematocrit 04/30/2024 37.0  37.0 - 48.5 % Final    MCV 04/30/2024 69 (L)  82 - 98 fL Final    MCH 04/30/2024 20.9 (L)  27.0 - 31.0 pg Final    MCHC 04/30/2024 30.3 (L)  32.0 - 36.0 g/dL Final    RDW 04/30/2024 15.9 (H)  11.5 - 14.5 % Final    Platelets 04/30/2024 211  150 - 450 K/uL Final    MPV 04/30/2024 9.7  9.2 - 12.9 fL Final    Immature Granulocytes 04/30/2024 0.4  0.0 - 0.5 % Final    Gran # (ANC) 04/30/2024 10.5 (H)  1.8 - 7.7 K/uL Final    Immature Grans (Abs) 04/30/2024 0.05 (H)  0.00 - 0.04 K/uL Final    Comment: Mild elevation in immature granulocytes is non specific and   can be seen in a variety of conditions including stress response,   acute inflammation, trauma and pregnancy. Correlation with other   laboratory and clinical findings is essential.      Lymph # 04/30/2024 0.5 (L)  1.0 - 4.8 K/uL Final    Mono # 04/30/2024 0.4  0.3 - 1.0 K/uL Final    Eos # 04/30/2024 0.0  0.0 - 0.5 K/uL Final    Baso # 04/30/2024 0.01  0.00 - 0.20 K/uL Final    nRBC 04/30/2024 0  0 /100 WBC Final    Gran % 04/30/2024 91.3 (H)  38.0 - 73.0 % Final    Lymph % 04/30/2024 4.2 (L)  18.0 - 48.0 % Final    Mono % 04/30/2024 3.8 (L)  4.0 - 15.0 % Final    Eosinophil % 04/30/2024 0.2  0.0 - 8.0 % Final    Basophil % 04/30/2024 0.1  0.0 - 1.9 % Final    Differential Method 04/30/2024 Automated   Final    Sodium 04/30/2024 142  136 - 145 mmol/L Final    Potassium 04/30/2024 3.1 (L)  3.5 - 5.1 mmol/L Final    Chloride 04/30/2024 99  95 - 110 mmol/L Final    CO2 04/30/2024 27  23 - 29 mmol/L Final    Glucose 04/30/2024 128 (H)  70 - 110 mg/dL Final    BUN 04/30/2024 21  8 - 23 mg/dL Final    Creatinine 04/30/2024 1.0  0.5 - 1.4 mg/dL Final    Calcium 04/30/2024 11.1 (H)  8.7 - 10.5 mg/dL Final    Total Protein 04/30/2024 8.3  6.0 - 8.4 g/dL Final    Albumin 04/30/2024 4.2  3.5 - 5.2 g/dL Final    Total Bilirubin 04/30/2024 2.2 (H)  0.1 - 1.0 mg/dL Final    Comment: For infants and newborns, interpretation of results  should be based  on gestational age, weight and in agreement with clinical  observations.    Premature Infant recommended reference ranges:  Up to 24 hours.............<8.0 mg/dL  Up to 48 hours............<12.0 mg/dL  3-5 days..................<15.0 mg/dL  6-29 days.................<15.0 mg/dL      Alkaline Phosphatase 04/30/2024 464 (H)  55 - 135 U/L Final    AST 04/30/2024 238 (H)  10 - 40 U/L Final    ALT 04/30/2024 246 (H)  10 - 44 U/L Final    eGFR 04/30/2024 60  >60 mL/min/1.73 m^2 Final    Anion Gap 04/30/2024 16  8 - 16 mmol/L Final    Lipase 04/30/2024 128 (H)  4 - 60 U/L Final    Specimen UA 04/30/2024 Urine, Clean Catch   Final    Color, UA 04/30/2024 Yellow  Yellow, Straw, Anitha Final    Appearance, UA 04/30/2024 Hazy (A)  Clear Final    pH, UA 04/30/2024 8.0  5.0 - 8.0 Final    Specific Gravity, UA 04/30/2024 1.015  1.005 - 1.030 Final    Protein, UA 04/30/2024 Negative  Negative Final    Comment: Recommend a 24 hour urine protein or a urine   protein/creatinine ratio if globulin induced proteinuria is  clinically suspected.      Glucose, UA 04/30/2024 Negative  Negative Final    Ketones, UA 04/30/2024 Negative  Negative Final    Bilirubin (UA) 04/30/2024 Negative  Negative Final    Occult Blood UA 04/30/2024 Negative  Negative Final    Nitrite, UA 04/30/2024 Negative  Negative Final    Urobilinogen, UA 04/30/2024 4.0-6.0 (A)  <2.0 EU/dL Final    Leukocytes, UA 04/30/2024 3+ (A)  Negative Final    Troponin I 04/30/2024 <0.006  0.000 - 0.026 ng/mL Final    Comment: The reference interval for Troponin I represents the 99th percentile   cutoff   for our facility and is consistent with 3rd generation assay   performance.      TSH 04/30/2024 1.471  0.400 - 4.000 uIU/mL Final    Ammonia 04/30/2024 29  10 - 50 umol/L Final    Benzodiazepines 04/30/2024 Negative  Negative Final    Methadone metabolites 04/30/2024 Negative  Negative Final    Cocaine (Metab.) 04/30/2024 Negative  Negative Final    Opiate Scrn,  Ur 04/30/2024 Negative  Negative Final    Barbiturate Screen, Ur 04/30/2024 Negative  Negative Final    Amphetamine Screen, Ur 04/30/2024 Negative  Negative Final    THC 04/30/2024 Negative  Negative Final    Phencyclidine 04/30/2024 Negative  Negative Final    Creatinine, Urine 04/30/2024 80.5  15.0 - 325.0 mg/dL Final    Toxicology Information 04/30/2024 SEE COMMENT   Final    Comment: This screen includes the following classes of drugs at the listed   cut-off:    Benzodiazepines 200 ng/ml  Methadone 300 ng/ml  Cocaine metabolite 300 ng/ml  Opiates 300 ng/ml  Barbiturates 200 ng/ml  Amphetamines 1000 ng/ml  Marijuana metabs (THC) 50 ng/ml  Phencyclidine (PCP) 25 ng/ml    This is a screening test. If results do not correlate with clinical   presentation, then a confirmatory send out test is advised.     This report is intended for use in clinical monitoring and management   of   patients. It is not intended for use in employment related drug   testing.      Salicylate Lvl 04/30/2024 <5.0 (L)  15.0 - 30.0 mg/dL Final    Comment: Toxic:  30.0 - 70.0 mg/dl  Lethal: >70.0 mg/dl      Acetaminophen (Tylenol), Serum 04/30/2024 <3.0 (L)  10.0 - 20.0 ug/mL Final    Comment: Toxic Levels:  Adults (4 hr post-ingestion).........>150 ug/mL  Adults (12 hr post-ingestion)........>40 ug/mL  Peds (2 hr post-ingestion, liquid)...>225 ug/mL      POC PH 04/30/2024 7.420  7.35 - 7.45 Final    POC PCO2 04/30/2024 45.0  35 - 45 mmHg Final    POC PO2 04/30/2024 23 (LL)  40 - 60 mmHg Final    POC HCO3 04/30/2024 29.2 (H)  24 - 28 mmol/L Final    POC BE 04/30/2024 4 (H)  -2 to 2 mmol/L Final    POC SATURATED O2 04/30/2024 40  95 - 100 % Final    POC TCO2 04/30/2024 31 (H)  24 - 29 mmol/L Final    Sample 04/30/2024 VENOUS   Final    Site 04/30/2024 Other   Final    Allens Test 04/30/2024 N/A   Final    DelSys 04/30/2024 Room Air   Final    Mode 04/30/2024 SPONT   Final    FiO2 04/30/2024 21   Final    Sp02 04/30/2024 98   Final    WBC, UA  04/30/2024 37 (H)  0 - 5 /hpf Final    Bacteria 04/30/2024 Rare  None-Occ /hpf Final    Squam Epithel, UA 04/30/2024 32  /hpf Final    Non-Squam Epith 04/30/2024 2 (A)  <1/hpf /hpf Final    Hyaline Casts, UA 04/30/2024 1  0-1/lpf /lpf Final    Microscopic Comment 04/30/2024 SEE COMMENT   Final    Comment: Other formed elements not mentioned in the report are not   present in the microscopic examination.       Hepatitis B Surface Ag 04/30/2024 Non-reactive  Non-reactive Final    Hep B C IgM 04/30/2024 Non-reactive  Non-reactive Final    Hep A IgM 04/30/2024 Non-reactive  Non-reactive Final    Hepatitis C Ab 04/30/2024 Non-reactive  Non-reactive Final    Prothrombin Time 04/30/2024 10.2  9.0 - 12.5 sec Final    INR 04/30/2024 0.9  0.8 - 1.2 Final    Comment: Coumadin Therapy:  2.0 - 3.0 for INR for all indicators except mechanical heart valves  and antiphospholipid syndromes which should use 2.5 - 3.5.      Cholesterol 04/30/2024 179  120 - 199 mg/dL Final    Comment: The National Cholesterol Education Program (NCEP) has set the  following guidelines (reference ranges) for Cholesterol:  Optimal.....................<200 mg/dL  Borderline High.............200-239 mg/dL  High........................> or = 240 mg/dL      Triglycerides 04/30/2024 88  30 - 150 mg/dL Final    Comment: The National Cholesterol Education Program (NCEP) has set the  following guidelines (reference values) for triglycerides:  Normal......................<150 mg/dL  Borderline High.............150-199 mg/dL  High........................200-499 mg/dL      HDL 04/30/2024 73  40 - 75 mg/dL Final    Comment: The National Cholesterol Education Program (NCEP) has set the  following guidelines (reference values) for HDL Cholesterol:  Low...............<40 mg/dL  Optimal...........>60 mg/dL      LDL Cholesterol 04/30/2024 88.4  63.0 - 159.0 mg/dL Final    Comment: The National Cholesterol Education Program (NCEP) has set the  following guidelines  (reference values) for LDL Cholesterol:  Optimal.......................<130 mg/dL  Borderline High...............130-159 mg/dL  High..........................160-189 mg/dL  Very High.....................>190 mg/dL      HDL/Cholesterol Ratio 04/30/2024 40.8  20.0 - 50.0 % Final    Total Cholesterol/HDL Ratio 04/30/2024 2.5  2.0 - 5.0 Final    Non-HDL Cholesterol 04/30/2024 106  mg/dL Final    Comment: Risk category and Non-HDL cholesterol goals:  Coronary heart disease (CHD)or equivalent (10-year risk of CHD >20%):  Non-HDL cholesterol goal     <130 mg/dL  Two or more CHD risk factors and 10-year risk of CHD <= 20%:  Non-HDL cholesterol goal     <160 mg/dL  0 to 1 CHD risk factor:  Non-HDL cholesterol goal     <190 mg/dL      Iron 04/30/2024 57  30 - 160 ug/dL Final    Transferrin 04/30/2024 277  200 - 375 mg/dL Final    TIBC 04/30/2024 410  250 - 450 ug/dL Final    Saturated Iron 04/30/2024 14 (L)  20 - 50 % Final    Total Protein 04/30/2024 7.4  6.0 - 8.4 g/dL Final    Albumin 04/30/2024 3.7  3.5 - 5.2 g/dL Final    Total Bilirubin 04/30/2024 2.8 (H)  0.1 - 1.0 mg/dL Final    Comment: For infants and newborns, interpretation of results should be based  on gestational age, weight and in agreement with clinical  observations.    Premature Infant recommended reference ranges:  Up to 24 hours.............<8.0 mg/dL  Up to 48 hours............<12.0 mg/dL  3-5 days..................<15.0 mg/dL  6-29 days.................<15.0 mg/dL      Bilirubin, Direct 04/30/2024 2.0 (H)  0.1 - 0.3 mg/dL Final    AST 04/30/2024 234 (H)  10 - 40 U/L Final    ALT 04/30/2024 280 (H)  10 - 44 U/L Final    Alkaline Phosphatase 04/30/2024 421 (H)  55 - 135 U/L Final    Ceruloplasmin 04/30/2024 33.0  15.0 - 45.0 mg/dL Final    Sodium 04/30/2024 140  136 - 145 mmol/L Final    Potassium 04/30/2024 4.1  3.5 - 5.1 mmol/L Final    Chloride 04/30/2024 101  95 - 110 mmol/L Final    CO2 04/30/2024 28  23 - 29 mmol/L Final    Glucose 04/30/2024 106   70 - 110 mg/dL Final    BUN 04/30/2024 18  8 - 23 mg/dL Final    Creatinine 04/30/2024 1.0  0.5 - 1.4 mg/dL Final    Calcium 04/30/2024 10.5  8.7 - 10.5 mg/dL Final    Total Protein 04/30/2024 7.4  6.0 - 8.4 g/dL Final    Albumin 04/30/2024 3.7  3.5 - 5.2 g/dL Final    Total Bilirubin 04/30/2024 2.8 (H)  0.1 - 1.0 mg/dL Final    Comment: For infants and newborns, interpretation of results should be based  on gestational age, weight and in agreement with clinical  observations.    Premature Infant recommended reference ranges:  Up to 24 hours.............<8.0 mg/dL  Up to 48 hours............<12.0 mg/dL  3-5 days..................<15.0 mg/dL  6-29 days.................<15.0 mg/dL      Alkaline Phosphatase 04/30/2024 421 (H)  55 - 135 U/L Final    AST 04/30/2024 234 (H)  10 - 40 U/L Final    ALT 04/30/2024 280 (H)  10 - 44 U/L Final    eGFR 04/30/2024 60  >60 mL/min/1.73 m^2 Final    Anion Gap 04/30/2024 11  8 - 16 mmol/L Final    Ferritin 04/30/2024 703 (H)  20.0 - 300.0 ng/mL Final    Hepatitis C Ab 04/30/2024 Non-reactive  Non-reactive Final    Hepatitis B Surface Ag 04/30/2024 Non-reactive  Non-reactive Final    Hep A IgM 04/30/2024 Non-reactive  Non-reactive Final        Imaging Reviewed    Imaging Results              X-Ray Chest 1 View (Final result)  Result time 04/30/24 07:03:30      Final result by Arnulfo Koch MD (04/30/24 07:03:30)                   Impression:      See above      Electronically signed by: Arnulfo Koch MD  Date:    04/30/2024  Time:    07:03               Narrative:    EXAMINATION:  XR CHEST 1 VIEW    CLINICAL HISTORY:  Epigastric pain    TECHNIQUE:  Single frontal view of the chest was performed.    COMPARISON:  08/07/2013    FINDINGS:  Cardiac size is normal.  Lungs are clear and well aerated.                                       US Abdomen Limited (Final result)  Result time 04/30/24 06:23:08      Final result by Parth Ross MD (04/30/24 06:23:08)                    Impression:      1. No definite acute sonographic findings identified to account for the patient's reported symptoms.  2. Additional details, as provided in the body of the report.      Electronically signed by: Parth Ross  Date:    04/30/2024  Time:    06:23               Narrative:    EXAMINATION:  US ABDOMEN LIMITED    CLINICAL HISTORY:  RUQ;    TECHNIQUE:  Limited ultrasound of the right upper quadrant of the abdomen (including pancreas, liver, gallbladder, common bile duct, and spleen) was performed.    COMPARISON:  None.    FINDINGS:  Liver: Normal in size, measuring 12.8 cm. Fatty liver infiltration. No focal hepatic lesions.    Gallbladder: Status post cholecystectomy.    Biliary system: The common duct is not dilated, measuring 3 mm.  No intrahepatic ductal dilatation.    Spleen: Normal in size and echotexture, measuring 12.7 x 3.9 cm.    Miscellaneous: No upper abdominal ascites.  Visualized region of the pancreas grossly unremarkable.  No evidence of right-sided hydronephrosis.                                       CT Head Without Contrast (Final result)  Result time 04/30/24 02:27:34      Final result by Shan Magallon MD (04/30/24 02:27:34)                   Impression:      No acute intracranial abnormalities.    Sinus disease, as above.      Electronically signed by: Shan Magallon MD  Date:    04/30/2024  Time:    02:27               Narrative:    EXAMINATION:  CT HEAD WITHOUT CONTRAST    CLINICAL HISTORY:  Memory loss;    TECHNIQUE:  Low dose axial images were obtained through the head.  Coronal and sagittal reformations were also performed. Contrast was not administered.    COMPARISON:  None.    FINDINGS:  The brain parenchyma appears normal for age with good corticomedullary differentiation.  There is no evidence of acute infarct, hemorrhage, or mass.  There is ventricular and sulcal enlargement consistent with generalized atrophy.  Mild confluent decreased supratentorial white matter  attenuation most likely related to chronic nonspecific small vessel disease.   No mass-effect or midline shift.  There are no abnormal extra-axial fluid collections.  Mucoperiosteal thickening in the sphenoid sinuses and anterior left ethmoid air cell complex.  Possible air-fluid level left sphenoid sinus.  The remaining visualized paranasal sinuses and mastoid air cells are essentially clear .  The calvarium appears intact. Left phthisis bulbi.                                      Medications given in ED    Medications   0.9%  NaCl infusion ( Intravenous New Bag 4/30/24 2019)   brimonidine (ALPHAGAN P) ophthalmic solution 0.1% (1 drop Both Eyes Given 4/30/24 2115)   dorzolamide-timolol 2-0.5% ophthalmic solution 1 drop (1 drop Both Eyes Given 4/30/24 2115)   fluorometholone 0.1% ophthalmic suspension 1 drop (1 drop Both Eyes Given 4/30/24 1307)   latanoprost 0.005 % ophthalmic solution 1 drop (has no administration in time range)   sodium chloride 0.9% flush 10 mL (has no administration in time range)   naloxone 0.4 mg/mL injection 0.02 mg (has no administration in time range)   glucose chewable tablet 16 g (has no administration in time range)   glucose chewable tablet 24 g (has no administration in time range)   glucagon (human recombinant) injection 1 mg (has no administration in time range)   enoxaparin injection 40 mg (40 mg Subcutaneous Given 4/30/24 1603)   dextrose 10% bolus 125 mL 125 mL (has no administration in time range)   dextrose 10% bolus 250 mL 250 mL (has no administration in time range)   potassium bicarbonate disintegrating tablet 40 mEq (40 mEq Oral Given 4/30/24 0451)   ondansetron injection 4 mg (4 mg Intravenous Given 4/30/24 0642)         Note was created using voice recognition software. Note may have occasional typographical errors that may not have been identified and edited despite good billie initial review prior to signing.    Labs Reviewed  Admission on 04/30/2024   Component Date  Value Ref Range Status    WBC 04/30/2024 11.44  3.90 - 12.70 K/uL Final    RBC 04/30/2024 5.35  4.00 - 5.40 M/uL Final    Hemoglobin 04/30/2024 11.2 (L)  12.0 - 16.0 g/dL Final    Hematocrit 04/30/2024 37.0  37.0 - 48.5 % Final    MCV 04/30/2024 69 (L)  82 - 98 fL Final    MCH 04/30/2024 20.9 (L)  27.0 - 31.0 pg Final    MCHC 04/30/2024 30.3 (L)  32.0 - 36.0 g/dL Final    RDW 04/30/2024 15.9 (H)  11.5 - 14.5 % Final    Platelets 04/30/2024 211  150 - 450 K/uL Final    MPV 04/30/2024 9.7  9.2 - 12.9 fL Final    Immature Granulocytes 04/30/2024 0.4  0.0 - 0.5 % Final    Gran # (ANC) 04/30/2024 10.5 (H)  1.8 - 7.7 K/uL Final    Immature Grans (Abs) 04/30/2024 0.05 (H)  0.00 - 0.04 K/uL Final    Comment: Mild elevation in immature granulocytes is non specific and   can be seen in a variety of conditions including stress response,   acute inflammation, trauma and pregnancy. Correlation with other   laboratory and clinical findings is essential.      Lymph # 04/30/2024 0.5 (L)  1.0 - 4.8 K/uL Final    Mono # 04/30/2024 0.4  0.3 - 1.0 K/uL Final    Eos # 04/30/2024 0.0  0.0 - 0.5 K/uL Final    Baso # 04/30/2024 0.01  0.00 - 0.20 K/uL Final    nRBC 04/30/2024 0  0 /100 WBC Final    Gran % 04/30/2024 91.3 (H)  38.0 - 73.0 % Final    Lymph % 04/30/2024 4.2 (L)  18.0 - 48.0 % Final    Mono % 04/30/2024 3.8 (L)  4.0 - 15.0 % Final    Eosinophil % 04/30/2024 0.2  0.0 - 8.0 % Final    Basophil % 04/30/2024 0.1  0.0 - 1.9 % Final    Differential Method 04/30/2024 Automated   Final    Sodium 04/30/2024 142  136 - 145 mmol/L Final    Potassium 04/30/2024 3.1 (L)  3.5 - 5.1 mmol/L Final    Chloride 04/30/2024 99  95 - 110 mmol/L Final    CO2 04/30/2024 27  23 - 29 mmol/L Final    Glucose 04/30/2024 128 (H)  70 - 110 mg/dL Final    BUN 04/30/2024 21  8 - 23 mg/dL Final    Creatinine 04/30/2024 1.0  0.5 - 1.4 mg/dL Final    Calcium 04/30/2024 11.1 (H)  8.7 - 10.5 mg/dL Final    Total Protein 04/30/2024 8.3  6.0 - 8.4 g/dL Final     Albumin 04/30/2024 4.2  3.5 - 5.2 g/dL Final    Total Bilirubin 04/30/2024 2.2 (H)  0.1 - 1.0 mg/dL Final    Comment: For infants and newborns, interpretation of results should be based  on gestational age, weight and in agreement with clinical  observations.    Premature Infant recommended reference ranges:  Up to 24 hours.............<8.0 mg/dL  Up to 48 hours............<12.0 mg/dL  3-5 days..................<15.0 mg/dL  6-29 days.................<15.0 mg/dL      Alkaline Phosphatase 04/30/2024 464 (H)  55 - 135 U/L Final    AST 04/30/2024 238 (H)  10 - 40 U/L Final    ALT 04/30/2024 246 (H)  10 - 44 U/L Final    eGFR 04/30/2024 60  >60 mL/min/1.73 m^2 Final    Anion Gap 04/30/2024 16  8 - 16 mmol/L Final    Lipase 04/30/2024 128 (H)  4 - 60 U/L Final    Specimen UA 04/30/2024 Urine, Clean Catch   Final    Color, UA 04/30/2024 Yellow  Yellow, Straw, Anitha Final    Appearance, UA 04/30/2024 Hazy (A)  Clear Final    pH, UA 04/30/2024 8.0  5.0 - 8.0 Final    Specific Gravity, UA 04/30/2024 1.015  1.005 - 1.030 Final    Protein, UA 04/30/2024 Negative  Negative Final    Comment: Recommend a 24 hour urine protein or a urine   protein/creatinine ratio if globulin induced proteinuria is  clinically suspected.      Glucose, UA 04/30/2024 Negative  Negative Final    Ketones, UA 04/30/2024 Negative  Negative Final    Bilirubin (UA) 04/30/2024 Negative  Negative Final    Occult Blood UA 04/30/2024 Negative  Negative Final    Nitrite, UA 04/30/2024 Negative  Negative Final    Urobilinogen, UA 04/30/2024 4.0-6.0 (A)  <2.0 EU/dL Final    Leukocytes, UA 04/30/2024 3+ (A)  Negative Final    Troponin I 04/30/2024 <0.006  0.000 - 0.026 ng/mL Final    Comment: The reference interval for Troponin I represents the 99th percentile   cutoff   for our facility and is consistent with 3rd generation assay   performance.      TSH 04/30/2024 1.471  0.400 - 4.000 uIU/mL Final    Ammonia 04/30/2024 29  10 - 50 umol/L Final    Benzodiazepines  04/30/2024 Negative  Negative Final    Methadone metabolites 04/30/2024 Negative  Negative Final    Cocaine (Metab.) 04/30/2024 Negative  Negative Final    Opiate Scrn, Ur 04/30/2024 Negative  Negative Final    Barbiturate Screen, Ur 04/30/2024 Negative  Negative Final    Amphetamine Screen, Ur 04/30/2024 Negative  Negative Final    THC 04/30/2024 Negative  Negative Final    Phencyclidine 04/30/2024 Negative  Negative Final    Creatinine, Urine 04/30/2024 80.5  15.0 - 325.0 mg/dL Final    Toxicology Information 04/30/2024 SEE COMMENT   Final    Comment: This screen includes the following classes of drugs at the listed   cut-off:    Benzodiazepines 200 ng/ml  Methadone 300 ng/ml  Cocaine metabolite 300 ng/ml  Opiates 300 ng/ml  Barbiturates 200 ng/ml  Amphetamines 1000 ng/ml  Marijuana metabs (THC) 50 ng/ml  Phencyclidine (PCP) 25 ng/ml    This is a screening test. If results do not correlate with clinical   presentation, then a confirmatory send out test is advised.     This report is intended for use in clinical monitoring and management   of   patients. It is not intended for use in employment related drug   testing.      Salicylate Lvl 04/30/2024 <5.0 (L)  15.0 - 30.0 mg/dL Final    Comment: Toxic:  30.0 - 70.0 mg/dl  Lethal: >70.0 mg/dl      Acetaminophen (Tylenol), Serum 04/30/2024 <3.0 (L)  10.0 - 20.0 ug/mL Final    Comment: Toxic Levels:  Adults (4 hr post-ingestion).........>150 ug/mL  Adults (12 hr post-ingestion)........>40 ug/mL  Peds (2 hr post-ingestion, liquid)...>225 ug/mL      POC PH 04/30/2024 7.420  7.35 - 7.45 Final    POC PCO2 04/30/2024 45.0  35 - 45 mmHg Final    POC PO2 04/30/2024 23 (LL)  40 - 60 mmHg Final    POC HCO3 04/30/2024 29.2 (H)  24 - 28 mmol/L Final    POC BE 04/30/2024 4 (H)  -2 to 2 mmol/L Final    POC SATURATED O2 04/30/2024 40  95 - 100 % Final    POC TCO2 04/30/2024 31 (H)  24 - 29 mmol/L Final    Sample 04/30/2024 VENOUS   Final    Site 04/30/2024 Other   Final    Allens  Test 04/30/2024 N/A   Final    DelSys 04/30/2024 Room Air   Final    Mode 04/30/2024 SPONT   Final    FiO2 04/30/2024 21   Final    Sp02 04/30/2024 98   Final    WBC, UA 04/30/2024 37 (H)  0 - 5 /hpf Final    Bacteria 04/30/2024 Rare  None-Occ /hpf Final    Squam Epithel, UA 04/30/2024 32  /hpf Final    Non-Squam Epith 04/30/2024 2 (A)  <1/hpf /hpf Final    Hyaline Casts, UA 04/30/2024 1  0-1/lpf /lpf Final    Microscopic Comment 04/30/2024 SEE COMMENT   Final    Comment: Other formed elements not mentioned in the report are not   present in the microscopic examination.       Hepatitis B Surface Ag 04/30/2024 Non-reactive  Non-reactive Final    Hep B C IgM 04/30/2024 Non-reactive  Non-reactive Final    Hep A IgM 04/30/2024 Non-reactive  Non-reactive Final    Hepatitis C Ab 04/30/2024 Non-reactive  Non-reactive Final    Prothrombin Time 04/30/2024 10.2  9.0 - 12.5 sec Final    INR 04/30/2024 0.9  0.8 - 1.2 Final    Comment: Coumadin Therapy:  2.0 - 3.0 for INR for all indicators except mechanical heart valves  and antiphospholipid syndromes which should use 2.5 - 3.5.      Cholesterol 04/30/2024 179  120 - 199 mg/dL Final    Comment: The National Cholesterol Education Program (NCEP) has set the  following guidelines (reference ranges) for Cholesterol:  Optimal.....................<200 mg/dL  Borderline High.............200-239 mg/dL  High........................> or = 240 mg/dL      Triglycerides 04/30/2024 88  30 - 150 mg/dL Final    Comment: The National Cholesterol Education Program (NCEP) has set the  following guidelines (reference values) for triglycerides:  Normal......................<150 mg/dL  Borderline High.............150-199 mg/dL  High........................200-499 mg/dL      HDL 04/30/2024 73  40 - 75 mg/dL Final    Comment: The National Cholesterol Education Program (NCEP) has set the  following guidelines (reference values) for HDL Cholesterol:  Low...............<40 mg/dL  Optimal...........>60  mg/dL      LDL Cholesterol 04/30/2024 88.4  63.0 - 159.0 mg/dL Final    Comment: The National Cholesterol Education Program (NCEP) has set the  following guidelines (reference values) for LDL Cholesterol:  Optimal.......................<130 mg/dL  Borderline High...............130-159 mg/dL  High..........................160-189 mg/dL  Very High.....................>190 mg/dL      HDL/Cholesterol Ratio 04/30/2024 40.8  20.0 - 50.0 % Final    Total Cholesterol/HDL Ratio 04/30/2024 2.5  2.0 - 5.0 Final    Non-HDL Cholesterol 04/30/2024 106  mg/dL Final    Comment: Risk category and Non-HDL cholesterol goals:  Coronary heart disease (CHD)or equivalent (10-year risk of CHD >20%):  Non-HDL cholesterol goal     <130 mg/dL  Two or more CHD risk factors and 10-year risk of CHD <= 20%:  Non-HDL cholesterol goal     <160 mg/dL  0 to 1 CHD risk factor:  Non-HDL cholesterol goal     <190 mg/dL      Iron 04/30/2024 57  30 - 160 ug/dL Final    Transferrin 04/30/2024 277  200 - 375 mg/dL Final    TIBC 04/30/2024 410  250 - 450 ug/dL Final    Saturated Iron 04/30/2024 14 (L)  20 - 50 % Final    Total Protein 04/30/2024 7.4  6.0 - 8.4 g/dL Final    Albumin 04/30/2024 3.7  3.5 - 5.2 g/dL Final    Total Bilirubin 04/30/2024 2.8 (H)  0.1 - 1.0 mg/dL Final    Comment: For infants and newborns, interpretation of results should be based  on gestational age, weight and in agreement with clinical  observations.    Premature Infant recommended reference ranges:  Up to 24 hours.............<8.0 mg/dL  Up to 48 hours............<12.0 mg/dL  3-5 days..................<15.0 mg/dL  6-29 days.................<15.0 mg/dL      Bilirubin, Direct 04/30/2024 2.0 (H)  0.1 - 0.3 mg/dL Final    AST 04/30/2024 234 (H)  10 - 40 U/L Final    ALT 04/30/2024 280 (H)  10 - 44 U/L Final    Alkaline Phosphatase 04/30/2024 421 (H)  55 - 135 U/L Final    Ceruloplasmin 04/30/2024 33.0  15.0 - 45.0 mg/dL Final    Sodium 04/30/2024 140  136 - 145 mmol/L Final     Potassium 04/30/2024 4.1  3.5 - 5.1 mmol/L Final    Chloride 04/30/2024 101  95 - 110 mmol/L Final    CO2 04/30/2024 28  23 - 29 mmol/L Final    Glucose 04/30/2024 106  70 - 110 mg/dL Final    BUN 04/30/2024 18  8 - 23 mg/dL Final    Creatinine 04/30/2024 1.0  0.5 - 1.4 mg/dL Final    Calcium 04/30/2024 10.5  8.7 - 10.5 mg/dL Final    Total Protein 04/30/2024 7.4  6.0 - 8.4 g/dL Final    Albumin 04/30/2024 3.7  3.5 - 5.2 g/dL Final    Total Bilirubin 04/30/2024 2.8 (H)  0.1 - 1.0 mg/dL Final    Comment: For infants and newborns, interpretation of results should be based  on gestational age, weight and in agreement with clinical  observations.    Premature Infant recommended reference ranges:  Up to 24 hours.............<8.0 mg/dL  Up to 48 hours............<12.0 mg/dL  3-5 days..................<15.0 mg/dL  6-29 days.................<15.0 mg/dL      Alkaline Phosphatase 04/30/2024 421 (H)  55 - 135 U/L Final    AST 04/30/2024 234 (H)  10 - 40 U/L Final    ALT 04/30/2024 280 (H)  10 - 44 U/L Final    eGFR 04/30/2024 60  >60 mL/min/1.73 m^2 Final    Anion Gap 04/30/2024 11  8 - 16 mmol/L Final    Ferritin 04/30/2024 703 (H)  20.0 - 300.0 ng/mL Final    Hepatitis C Ab 04/30/2024 Non-reactive  Non-reactive Final    Hepatitis B Surface Ag 04/30/2024 Non-reactive  Non-reactive Final    Hep A IgM 04/30/2024 Non-reactive  Non-reactive Final        Imaging Reviewed    Imaging Results              X-Ray Chest 1 View (Final result)  Result time 04/30/24 07:03:30      Final result by Arnulfo Koch MD (04/30/24 07:03:30)                   Impression:      See above      Electronically signed by: Arnulfo Koch MD  Date:    04/30/2024  Time:    07:03               Narrative:    EXAMINATION:  XR CHEST 1 VIEW    CLINICAL HISTORY:  Epigastric pain    TECHNIQUE:  Single frontal view of the chest was performed.    COMPARISON:  08/07/2013    FINDINGS:  Cardiac size is normal.  Lungs are clear and well aerated.                                        US Abdomen Limited (Final result)  Result time 04/30/24 06:23:08      Final result by Parth Ross MD (04/30/24 06:23:08)                   Impression:      1. No definite acute sonographic findings identified to account for the patient's reported symptoms.  2. Additional details, as provided in the body of the report.      Electronically signed by: Parth Ross  Date:    04/30/2024  Time:    06:23               Narrative:    EXAMINATION:  US ABDOMEN LIMITED    CLINICAL HISTORY:  RUQ;    TECHNIQUE:  Limited ultrasound of the right upper quadrant of the abdomen (including pancreas, liver, gallbladder, common bile duct, and spleen) was performed.    COMPARISON:  None.    FINDINGS:  Liver: Normal in size, measuring 12.8 cm. Fatty liver infiltration. No focal hepatic lesions.    Gallbladder: Status post cholecystectomy.    Biliary system: The common duct is not dilated, measuring 3 mm.  No intrahepatic ductal dilatation.    Spleen: Normal in size and echotexture, measuring 12.7 x 3.9 cm.    Miscellaneous: No upper abdominal ascites.  Visualized region of the pancreas grossly unremarkable.  No evidence of right-sided hydronephrosis.                                       CT Head Without Contrast (Final result)  Result time 04/30/24 02:27:34      Final result by Shan Magallon MD (04/30/24 02:27:34)                   Impression:      No acute intracranial abnormalities.    Sinus disease, as above.      Electronically signed by: Shan Magallon MD  Date:    04/30/2024  Time:    02:27               Narrative:    EXAMINATION:  CT HEAD WITHOUT CONTRAST    CLINICAL HISTORY:  Memory loss;    TECHNIQUE:  Low dose axial images were obtained through the head.  Coronal and sagittal reformations were also performed. Contrast was not administered.    COMPARISON:  None.    FINDINGS:  The brain parenchyma appears normal for age with good corticomedullary differentiation.  There is no evidence of acute  infarct, hemorrhage, or mass.  There is ventricular and sulcal enlargement consistent with generalized atrophy.  Mild confluent decreased supratentorial white matter attenuation most likely related to chronic nonspecific small vessel disease.   No mass-effect or midline shift.  There are no abnormal extra-axial fluid collections.  Mucoperiosteal thickening in the sphenoid sinuses and anterior left ethmoid air cell complex.  Possible air-fluid level left sphenoid sinus.  The remaining visualized paranasal sinuses and mastoid air cells are essentially clear .  The calvarium appears intact. Left phthisis bulbi.                                      Medications given in ED    Medications   0.9%  NaCl infusion ( Intravenous New Bag 4/30/24 2019)   brimonidine (ALPHAGAN P) ophthalmic solution 0.1% (1 drop Both Eyes Given 4/30/24 2115)   dorzolamide-timolol 2-0.5% ophthalmic solution 1 drop (1 drop Both Eyes Given 4/30/24 2115)   fluorometholone 0.1% ophthalmic suspension 1 drop (1 drop Both Eyes Given 4/30/24 1307)   latanoprost 0.005 % ophthalmic solution 1 drop (has no administration in time range)   sodium chloride 0.9% flush 10 mL (has no administration in time range)   naloxone 0.4 mg/mL injection 0.02 mg (has no administration in time range)   glucose chewable tablet 16 g (has no administration in time range)   glucose chewable tablet 24 g (has no administration in time range)   glucagon (human recombinant) injection 1 mg (has no administration in time range)   enoxaparin injection 40 mg (40 mg Subcutaneous Given 4/30/24 1603)   dextrose 10% bolus 125 mL 125 mL (has no administration in time range)   dextrose 10% bolus 250 mL 250 mL (has no administration in time range)   potassium bicarbonate disintegrating tablet 40 mEq (40 mEq Oral Given 4/30/24 0451)   ondansetron injection 4 mg (4 mg Intravenous Given 4/30/24 0642)       Note was created using voice recognition software. Note may have occasional typographical  errors that may not have been identified and edited despite good billie initial review prior to signing.         Medical Decision Making:   Differential Diagnosis:   Cholecystitis, choledocholithiasis, pancreatitis, bowel obstruction, right lower lobe pneumonia, atypical presentation of ACS, PUD, gastritis, GERD, gastroenteritis (bacterial vs viral vs parasitic vs toxigenic), gastric outlet obstruction, gastroparesis, atypical presentation of appendicitis, bowel perforation, peritonitis, abdominal aortic aneurysm (rupture), aortic dissection, pyelonephritis, ureterolithiasis, biliary colic, renal colic, cholangitis, mesenteric ischemia, GI bleed, dehydration, liver failure, renal failure, others      Clinical Tests:   Lab Tests: Ordered and Reviewed  Radiological Study: Ordered and Reviewed  Medical Tests: Ordered  ED Management:  Care turned over to Dr Harris pending labs, imaging, reassessment and final disposition.             Clinical Impression:  Final diagnoses:  [R11.2] Nausea and vomiting  [R10.13] Epigastric abdominal pain  [R74.01] Transaminitis (Primary)  [E80.6] Hyperbilirubinemia  [R07.9] Chest pain          ED Disposition Condition    Observation                 Enrico Finley MD  04/30/24 0331       Enrico Finley MD  04/30/24 9245

## 2024-05-01 ENCOUNTER — TELEPHONE (OUTPATIENT)
Dept: GASTROENTEROLOGY | Facility: CLINIC | Age: 74
End: 2024-05-01
Payer: MEDICARE

## 2024-05-01 VITALS
TEMPERATURE: 98 F | HEART RATE: 81 BPM | OXYGEN SATURATION: 98 % | HEIGHT: 62 IN | RESPIRATION RATE: 19 BRPM | BODY MASS INDEX: 25.76 KG/M2 | WEIGHT: 140 LBS | SYSTOLIC BLOOD PRESSURE: 147 MMHG | DIASTOLIC BLOOD PRESSURE: 64 MMHG

## 2024-05-01 DIAGNOSIS — R74.01 TRANSAMINITIS: Primary | ICD-10-CM

## 2024-05-01 DIAGNOSIS — R79.89 ELEVATED LFTS: ICD-10-CM

## 2024-05-01 LAB
ALBUMIN SERPL BCP-MCNC: 3.2 G/DL (ref 3.5–5.2)
ALP SERPL-CCNC: 379 U/L (ref 55–135)
ALT SERPL W/O P-5'-P-CCNC: 189 U/L (ref 10–44)
ANA PATTERN 1: NORMAL
ANA SER QL IF: POSITIVE
ANA TITR SER IF: NORMAL {TITER}
ANION GAP SERPL CALC-SCNC: 9 MMOL/L (ref 8–16)
AST SERPL-CCNC: 114 U/L (ref 10–40)
BILIRUB SERPL-MCNC: 2.4 MG/DL (ref 0.1–1)
BUN SERPL-MCNC: 15 MG/DL (ref 8–23)
CALCIUM SERPL-MCNC: 9.3 MG/DL (ref 8.7–10.5)
CHLORIDE SERPL-SCNC: 108 MMOL/L (ref 95–110)
CMV DNA SPEC QL NAA+PROBE: NORMAL
CO2 SERPL-SCNC: 25 MMOL/L (ref 23–29)
CREAT SERPL-MCNC: 0.9 MG/DL (ref 0.5–1.4)
CYTOMEGALOVIRUS PCR, QUANT: NOT DETECTED IU/ML
ERYTHROCYTE [DISTWIDTH] IN BLOOD BY AUTOMATED COUNT: 15.8 % (ref 11.5–14.5)
EST. GFR  (NO RACE VARIABLE): >60 ML/MIN/1.73 M^2
GLUCOSE SERPL-MCNC: 84 MG/DL (ref 70–110)
HCT VFR BLD AUTO: 31 % (ref 37–48.5)
HGB BLD-MCNC: 9.2 G/DL (ref 12–16)
MAGNESIUM SERPL-MCNC: 1.7 MG/DL (ref 1.6–2.6)
MCH RBC QN AUTO: 20.8 PG (ref 27–31)
MCHC RBC AUTO-ENTMCNC: 29.7 G/DL (ref 32–36)
MCV RBC AUTO: 70 FL (ref 82–98)
MITOCHONDRIA AB TITR SER IF: NORMAL {TITER}
PHOSPHATE SERPL-MCNC: 3.2 MG/DL (ref 2.7–4.5)
PLATELET # BLD AUTO: 181 K/UL (ref 150–450)
PMV BLD AUTO: 9.5 FL (ref 9.2–12.9)
POTASSIUM SERPL-SCNC: 3.4 MMOL/L (ref 3.5–5.1)
PROT SERPL-MCNC: 6.3 G/DL (ref 6–8.4)
RBC # BLD AUTO: 4.42 M/UL (ref 4–5.4)
SMOOTH MUSCLE AB TITR SER IF: NORMAL {TITER}
SODIUM SERPL-SCNC: 142 MMOL/L (ref 136–145)
WBC # BLD AUTO: 4.73 K/UL (ref 3.9–12.7)

## 2024-05-01 PROCEDURE — 25000003 PHARM REV CODE 250: Performed by: STUDENT IN AN ORGANIZED HEALTH CARE EDUCATION/TRAINING PROGRAM

## 2024-05-01 PROCEDURE — 36415 COLL VENOUS BLD VENIPUNCTURE: CPT | Performed by: STUDENT IN AN ORGANIZED HEALTH CARE EDUCATION/TRAINING PROGRAM

## 2024-05-01 PROCEDURE — 96361 HYDRATE IV INFUSION ADD-ON: CPT

## 2024-05-01 PROCEDURE — 85027 COMPLETE CBC AUTOMATED: CPT | Performed by: STUDENT IN AN ORGANIZED HEALTH CARE EDUCATION/TRAINING PROGRAM

## 2024-05-01 PROCEDURE — 83735 ASSAY OF MAGNESIUM: CPT | Performed by: STUDENT IN AN ORGANIZED HEALTH CARE EDUCATION/TRAINING PROGRAM

## 2024-05-01 PROCEDURE — 84100 ASSAY OF PHOSPHORUS: CPT | Performed by: STUDENT IN AN ORGANIZED HEALTH CARE EDUCATION/TRAINING PROGRAM

## 2024-05-01 PROCEDURE — G0378 HOSPITAL OBSERVATION PER HR: HCPCS

## 2024-05-01 PROCEDURE — 80053 COMPREHEN METABOLIC PANEL: CPT | Performed by: STUDENT IN AN ORGANIZED HEALTH CARE EDUCATION/TRAINING PROGRAM

## 2024-05-01 RX ORDER — ATORVASTATIN CALCIUM 10 MG/1
10 TABLET, FILM COATED ORAL DAILY
Start: 2024-05-01

## 2024-05-01 RX ORDER — POTASSIUM CHLORIDE 20 MEQ/1
40 TABLET, EXTENDED RELEASE ORAL ONCE
Status: COMPLETED | OUTPATIENT
Start: 2024-05-01 | End: 2024-05-01

## 2024-05-01 RX ADMIN — SODIUM CHLORIDE: 9 INJECTION, SOLUTION INTRAVENOUS at 04:05

## 2024-05-01 RX ADMIN — BRIMONIDINE TARTRATE 1 DROP: 1 SOLUTION/ DROPS OPHTHALMIC at 08:05

## 2024-05-01 RX ADMIN — FLUOROMETHOLONE 1 DROP: 1 SOLUTION/ DROPS OPHTHALMIC at 08:05

## 2024-05-01 RX ADMIN — POTASSIUM CHLORIDE 40 MEQ: 1500 TABLET, EXTENDED RELEASE ORAL at 11:05

## 2024-05-01 RX ADMIN — DORZOLAMIDE HYDROCHLORIDE AND TIMOLOL MALEATE 1 DROP: 20; 5 SOLUTION/ DROPS OPHTHALMIC at 08:05

## 2024-05-01 NOTE — PROGRESS NOTES
[facility]  Nurses Note -- 4 Eyes      5/1/2024       Skin assessed on: Q Shift      [x] No Pressure Injuries Present    []Prevention Measures Documented    [] Yes LDA  for Pressure Injury Previously documented     [] Yes New Pressure Injury Discovered   [] LDA for New Pressure Injury Added      Attending RN:  Javy Chiu RN     Second RN:  Zander Wolf RN

## 2024-05-01 NOTE — TELEPHONE ENCOUNTER
----- Message from Flor Hernandez NP sent at 5/1/2024  8:34 AM CDT -----  Regarding: Panc biliary clinic appt  Good Morning      Would you please schedule this patient for an appt with Panc [facility]ili [facility] , for transaminitis , once discharged from inpatient. Any available provider is fine.          Thanks,  Flor Hernandez NP

## 2024-05-01 NOTE — NURSING
[facility]  Nurses Note -- 4 Eyes      4/30/2024       Skin assessed on: Q Shift      [x] No Pressure Injuries Present    [x]Prevention Measures Documented    [] Yes LDA  for Pressure Injury Previously documented     [] Yes New Pressure Injury Discovered   [] LDA for New Pressure Injury Added      Attending RN:  Zander Wolf RN     Second RN:  SHO Fontaine

## 2024-05-01 NOTE — HOSPITAL COURSE
73-year-old female with past medical history of hypertension and hyperlipidemia admitted on 04/30/2024 for further evaluation of acute transaminitis.  Acute hepatitis panel nonreactive.  Recommended MRCP, but patient politely declined.  She reports she is claustrophobic, anti-anxiety medication offered. She refused anti-anxiety meds and refused MRCP again.  Patient's symptoms of abdominal pain has resolved prior to admission.  LFTs and bilirubin trended down.  Patient without fevers or leukocytosis.  GI consulted and discussed case with Dr Shields with [facility]. GI Rec f/u with pancreatic biliary clinic outpatient- request sent. Referral sent to hepatology also.     Pt denies any fever, headaches, vision changes, chest pain, shortness of breath, palpitations, abdominal pain, nausea, vomiting, or any new weaknesses. Feels ready to go home. Patient's exam on discharge was as follow: Patient is alert and oriented, appears in no acute distress, heart with regular rate and rhythm, lungs clear to asculation with non-labored breathing, abdomen soft, and no new weaknesses or focal deficits seen. Bilateral lower extremities without any edema or calf tenderness.     Patient was counseled regarding any abnormal labs, differential diagnosis, treatment options, risk-benefit, lifestyle changes, prognosis, current condition, and medications. Patient was interactive and attentive.  Patient's questions were answered in a respectful and timely manner. Patient was instructed to follow-up with PCP within 1 week and to continue taking medications as prescribed.  Instructed to also follow up with hepatology and advanced endoscopy team in the pancreatic clinic.Rec patient f/u with pcp to trend lfts, alk phos and bili until panc/bili or hep follow up. Also, extensively discussed the risks, benefits, and side effects of patient's medications. Discussed with patient about any medication changes. Patient  verbalized understanding and agrees to treatment plan.  Patient is stable for discharge.  Patient has no other questions or concerns at this time.  ED precautions discussed with the patient.    Vital signs are stable. Ambulating without any difficulty. Tolerating p.o. intake without any nausea or vomiting. Afebrile for over 24 hours. Patient is in stable condition and has no questions or concerns. Patient will be discharge to home once transportation secured. CM/SW to assist with discharge planning.     Vitals:    04/30/24 2320 05/01/24 0338 05/01/24 0724 05/01/24 1102   BP: 122/62 125/72 137/62 (!) 147/64   BP Location: Left arm Left arm     Patient Position: Lying Lying     Pulse: 72 75 78 81   Resp: 18 18 19 19   Temp: 98.8 °F (37.1 °C) 98.2 °F (36.8 °C) 98.2 °F (36.8 °C) 97.5 °F (36.4 °C)   TempSrc: Oral Oral     SpO2: 97% 97% 98% 98%   Weight:       Height:

## 2024-05-01 NOTE — DISCHARGE SUMMARY
Psychiatric Medicine  Discharge Summary      Patient Name: Yahaira Hinson  MRN: [id number]  [facility]: [id number]  Patient Class: OP- Observation  Admission Date: 4/30/2024  Hospital Length of Stay: 0 days  Discharge Date and Time: 5/1/2024  1:18 PM  Attending Physician: No att. providers found   Discharging Provider: Mert Kessler DO  Primary Care Provider: Raul Adkisn MD    Primary Care Team: Networked reference to record PCT     HPI:   73-year-old female with past medical history of hypertension and hyperlipidemia presented to the ED with complaints of right upper quadrant and epigastric abdominal pain that started 1 day ago.  Denies any nausea or vomiting.  Describes the pain as a sharp intermittent pain, with no radiation.  Denies any difficulty urinating, dysuria, or any increasing urinary urgency and frequency.  No smoking or alcohol use.  Denies any new medications or recent travels.    In the ED, patient was hemodynamically stable.  Labs significant for transaminitis with AST/ALT at 234/280 respectively.  Bilirubin elevated at 2.8, direct bilirubin 2.0.  Chest x-ray with no acute process.  Ultrasound abdomen with normal common duct and no intrahepatic ductal dilation.  Status post cholecystectomy.  MRCP recommended, patient refused.  Does not want to obtain MRI and does not want any medications that will sedate her.  GI consulted.  Patient admitted to hospital medicine for further evaluation and management.    * No surgery found *      Hospital Course:   73-year-old female with past medical history of hypertension and hyperlipidemia admitted on 04/30/2024 for further evaluation of acute transaminitis.  Acute hepatitis panel nonreactive.  Recommended MRCP, but patient politely declined.  She reports she is claustrophobic, anti-anxiety medication offered. She refused anti-anxiety meds and refused MRCP again.  Patient's symptoms of abdominal pain has resolved prior to admission.  LFTs  and bilirubin trended down.  Patient without fevers or leukocytosis.  GI consulted and discussed case with Dr Shields with [facility]. GI Rec f/u with pancreatic biliary clinic outpatient- request sent. Referral sent to hepatology also.     Pt denies any fever, headaches, vision changes, chest pain, shortness of breath, palpitations, abdominal pain, nausea, vomiting, or any new weaknesses. Feels ready to go home. Patient's exam on discharge was as follow: Patient is alert and oriented, appears in no acute distress, heart with regular rate and rhythm, lungs clear to asculation with non-labored breathing, abdomen soft, and no new weaknesses or focal deficits seen. Bilateral lower extremities without any edema or calf tenderness.     Patient was counseled regarding any abnormal labs, differential diagnosis, treatment options, risk-benefit, lifestyle changes, prognosis, current condition, and medications. Patient was interactive and attentive.  Patient's questions were answered in a respectful and timely manner. Patient was instructed to follow-up with PCP within 1 week and to continue taking medications as prescribed.  Instructed to also follow up with hepatology and advanced endoscopy team in the pancreatic clinic.Rec patient f/u with pcp to trend lfts, alk phos and bili until panc/bili or hep follow up. Also, extensively discussed the risks, benefits, and side effects of patient's medications. Discussed with patient about any medication changes. Patient verbalized understanding and agrees to treatment plan.  Patient is stable for discharge.  Patient has no other questions or concerns at this time.  ED precautions discussed with the patient.    Vital signs are stable. Ambulating without any difficulty. Tolerating p.o. intake without any nausea or vomiting. Afebrile for over 24 hours. Patient is in stable condition and has no questions or concerns. Patient will be discharge to home once  transportation secured. CM/SW to assist with discharge planning.     Vitals:    04/30/24 2320 05/01/24 0338 05/01/24 0724 05/01/24 1102   BP: 122/62 125/72 137/62 (!) 147/64   BP Location: Left arm Left arm     Patient Position: Lying Lying     Pulse: 72 75 78 81   Resp: 18 18 19 19   Temp: 98.8 °F (37.1 °C) 98.2 °F (36.8 °C) 98.2 °F (36.8 °C) 97.5 °F (36.4 °C)   TempSrc: Oral Oral     SpO2: 97% 97% 98% 98%   Weight:       Height:                Goals of Care Treatment Preferences:  Code Status: Full Code      Consults:   Consults (From admission, onward)          Status Ordering Provider     Inpatient consult to Gastroenterology  Once        Provider:  Marilyn Dan MD    Completed LIYA FRANKLIN            No new Assessment & Plan notes have been filed under this hospital service since the last note was generated.  Service: Hospital Medicine    Final Active Diagnoses:    Diagnosis Date Noted POA    PRINCIPAL PROBLEM:  Transaminitis [R74.01] 04/30/2024 Yes    Hyperbilirubinemia [E80.6] 04/30/2024 Yes    Hypokalemia [E87.6] 04/30/2024 Yes    Essential hypertension [I10] 05/29/2019 Yes    Dyslipidemia [E78.5] 05/29/2019 Yes    Overweight (BMI 25.0-29.9) [E66.3] 04/30/2024 Yes      Problems Resolved During this Admission:       Discharged Condition: stable    Disposition: Home or Self Care    Follow Up:   Follow-up Information       Raul Adkins MD Follow up in 1 week(s).    Specialty: Internal Medicine  Contact information:  [id number] [id number] [phone]               Pancreatic clinic Follow up in 1 week(s).                           Patient Instructions:      Diet Cardiac     Notify your health care provider if you experience any of the following:  temperature >100.4     Notify your health care provider if you experience any of the following:  persistent nausea and vomiting or diarrhea     Notify your health care provider if you experience any of the following:  severe uncontrolled  pain     Notify your health care provider if you experience any of the following:  increased confusion or weakness     Notify your health care provider if you experience any of the following:  persistent dizziness, light-headedness, or visual disturbances     Activity as tolerated       Significant Diagnostic Studies: Labs: All labs within the past 24 hours have been reviewed      Recent Results (from the past 100 hour(s))   CBC W/ AUTO DIFFERENTIAL    Collection Time: 04/30/24  2:41 AM   Result Value Ref Range    WBC 11.44 3.90 - 12.70 K/uL    RBC 5.35 4.00 - 5.40 M/uL    Hemoglobin 11.2 (L) 12.0 - 16.0 g/dL    Hematocrit 37.0 37.0 - 48.5 %    MCV 69 (L) 82 - 98 fL    MCH 20.9 (L) 27.0 - 31.0 pg    MCHC 30.3 (L) 32.0 - 36.0 g/dL    RDW 15.9 (H) 11.5 - 14.5 %    Platelets 211 150 - 450 K/uL    MPV 9.7 9.2 - 12.9 fL    Immature Granulocytes 0.4 0.0 - 0.5 %    Gran # (ANC) 10.5 (H) 1.8 - 7.7 K/uL    Immature Grans (Abs) 0.05 (H) 0.00 - 0.04 K/uL    Lymph # 0.5 (L) 1.0 - 4.8 K/uL    Mono # 0.4 0.3 - 1.0 K/uL    Eos # 0.0 0.0 - 0.5 K/uL    Baso # 0.01 0.00 - 0.20 K/uL    nRBC 0 0 /100 WBC    Gran % 91.3 (H) 38.0 - 73.0 %    Lymph % 4.2 (L) 18.0 - 48.0 %    Mono % 3.8 (L) 4.0 - 15.0 %    Eosinophil % 0.2 0.0 - 8.0 %    Basophil % 0.1 0.0 - 1.9 %    Differential Method Automated    Comp. Metabolic Panel    Collection Time: 04/30/24  2:41 AM   Result Value Ref Range    Sodium 142 136 - 145 mmol/L    Potassium 3.1 (L) 3.5 - 5.1 mmol/L    Chloride 99 95 - 110 mmol/L    CO2 27 23 - 29 mmol/L    Glucose 128 (H) 70 - 110 mg/dL    BUN 21 8 - 23 mg/dL    Creatinine 1.0 0.5 - 1.4 mg/dL    Calcium 11.1 (H) 8.7 - 10.5 mg/dL    Total Protein 8.3 6.0 - 8.4 g/dL    Albumin 4.2 3.5 - 5.2 g/dL    Total Bilirubin 2.2 (H) 0.1 - 1.0 mg/dL    Alkaline Phosphatase 464 (H) 55 - 135 U/L     (H) 10 - 40 U/L     (H) 10 - 44 U/L    eGFR 60 >60 mL/min/1.73 m^2    Anion Gap 16 8 - 16 mmol/L   Lipase    Collection Time: 04/30/24  2:41  AM   Result Value Ref Range    Lipase 128 (H) 4 - 60 U/L   Troponin I    Collection Time: 04/30/24  2:41 AM   Result Value Ref Range    Troponin I <0.006 0.000 - 0.026 ng/mL   Hepatitis Panel, Acute    Collection Time: 04/30/24  2:41 AM   Result Value Ref Range    Hepatitis B Surface Ag Non-reactive Non-reactive    Hep B C IgM Non-reactive Non-reactive    Hep A IgM Non-reactive Non-reactive    Hepatitis C Ab Non-reactive Non-reactive   TSH    Collection Time: 04/30/24  2:42 AM   Result Value Ref Range    TSH 1.471 0.400 - 4.000 uIU/mL   EKG 12-lead    Collection Time: 04/30/24  3:00 AM   Result Value Ref Range    QRS Duration 78 ms    [facility] QTC Calculation 440 ms   Ammonia    Collection Time: 04/30/24  3:08 AM   Result Value Ref Range    Ammonia 29 10 - 50 umol/L   Salicylate level    Collection Time: 04/30/24  3:08 AM   Result Value Ref Range    Salicylate Lvl <5.0 (L) 15.0 - 30.0 mg/dL   Acetaminophen level    Collection Time: 04/30/24  3:08 AM   Result Value Ref Range    Acetaminophen (Tylenol), Serum <3.0 (L) 10.0 - 20.0 ug/mL   Lipid Panel    Collection Time: 04/30/24  3:08 AM   Result Value Ref Range    Cholesterol 179 120 - 199 mg/dL    Triglycerides 88 30 - 150 mg/dL    HDL 73 40 - 75 mg/dL    LDL Cholesterol 88.4 63.0 - 159.0 mg/dL    HDL/Cholesterol Ratio 40.8 20.0 - 50.0 %    Total Cholesterol/HDL Ratio 2.5 2.0 - 5.0    Non-HDL Cholesterol 106 mg/dL   Iron and TIBC    Collection Time: 04/30/24  3:08 AM   Result Value Ref Range    Iron 57 30 - 160 ug/dL    Transferrin 277 200 - 375 mg/dL    TIBC 410 250 - 450 ug/dL    Saturated Iron 14 (L) 20 - 50 %   ISTAT PROCEDURE    Collection Time: 04/30/24  3:41 AM   Result Value Ref Range    POC PH 7.420 7.35 - 7.45    POC PCO2 45.0 35 - 45 mmHg    POC PO2 23 (LL) 40 - 60 mmHg    POC HCO3 29.2 (H) 24 - 28 mmol/L    POC BE 4 (H) -2 to 2 mmol/L    POC SATURATED O2 40 95 - 100 %    POC TCO2 31 (H) 24 - 29 mmol/L    Sample VENOUS     Site Other     Allens Test N/A      [facility] Room Air     Mode SPONT     FiO2 21     Sp02 98    Urinalysis, Reflex to Urine Culture Urine, Clean Catch    Collection Time: 04/30/24  3:57 AM    Specimen: Urine   Result Value Ref Range    Specimen UA Urine, Clean Catch     Color, UA Yellow Yellow, Straw, Anitha    Appearance, UA Hazy (A) Clear    pH, UA 8.0 5.0 - 8.0    Specific Gravity, UA 1.015 1.005 - 1.030    Protein, UA Negative Negative    Glucose, UA Negative Negative    Ketones, UA Negative Negative    Bilirubin (UA) Negative Negative    Occult Blood UA Negative Negative    Nitrite, UA Negative Negative    Urobilinogen, UA 4.0-6.0 (A) <2.0 EU/dL    Leukocytes, UA 3+ (A) Negative   Drug screen panel, in-house    Collection Time: 04/30/24  3:57 AM   Result Value Ref Range    Benzodiazepines Negative Negative    Methadone metabolites Negative Negative    Cocaine (Metab.) Negative Negative    Opiate Scrn, Ur Negative Negative    Barbiturate Screen, Ur Negative Negative    Amphetamine Screen, Ur Negative Negative    THC Negative Negative    Phencyclidine Negative Negative    Creatinine, Urine 80.5 15.0 - 325.0 mg/dL    Toxicology Information SEE COMMENT    Urinalysis Microscopic    Collection Time: 04/30/24  3:57 AM   Result Value Ref Range    WBC, UA 37 (H) 0 - 5 /hpf    Bacteria Rare None-Occ /hpf    Squam Epithel, UA 32 /hpf    Non-Squam Epith 2 (A) <1/hpf /hpf    Hyaline Casts, UA 1 0-1/lpf /lpf    Microscopic Comment SEE COMMENT    Urine culture    Collection Time: 04/30/24  3:57 AM    Specimen: Urine   Result Value Ref Range    Urine Culture, Routine No significant isolate to date    Protime-INR    Collection Time: 04/30/24  6:45 AM   Result Value Ref Range    Prothrombin Time 10.2 9.0 - 12.5 sec    INR 0.9 0.8 - 1.2   Hepatic function panel    Collection Time: 04/30/24 11:00 AM   Result Value Ref Range    Total Protein 7.4 6.0 - 8.4 g/dL    Albumin 3.7 3.5 - 5.2 g/dL    Total Bilirubin 2.8 (H) 0.1 - 1.0 mg/dL    Bilirubin, Direct 2.0 (H) 0.1 - 0.3  mg/dL     (H) 10 - 40 U/L     (H) 10 - 44 U/L    Alkaline Phosphatase 421 (H) 55 - 135 U/L   Ceruloplasmin    Collection Time: 04/30/24 11:00 AM   Result Value Ref Range    Ceruloplasmin 33.0 15.0 - 45.0 mg/dL   Comprehensive Metabolic Panel    Collection Time: 04/30/24 11:00 AM   Result Value Ref Range    Sodium 140 136 - 145 mmol/L    Potassium 4.1 3.5 - 5.1 mmol/L    Chloride 101 95 - 110 mmol/L    CO2 28 23 - 29 mmol/L    Glucose 106 70 - 110 mg/dL    BUN 18 8 - 23 mg/dL    Creatinine 1.0 0.5 - 1.4 mg/dL    Calcium 10.5 8.7 - 10.5 mg/dL    Total Protein 7.4 6.0 - 8.4 g/dL    Albumin 3.7 3.5 - 5.2 g/dL    Total Bilirubin 2.8 (H) 0.1 - 1.0 mg/dL    Alkaline Phosphatase 421 (H) 55 - 135 U/L     (H) 10 - 40 U/L     (H) 10 - 44 U/L    eGFR 60 >60 mL/min/1.73 m^2    Anion Gap 11 8 - 16 mmol/L   Ferritin    Collection Time: 04/30/24 11:00 AM   Result Value Ref Range    Ferritin 703 (H) 20.0 - 300.0 ng/mL   Hepatitis C antibody    Collection Time: 04/30/24 11:00 AM   Result Value Ref Range    Hepatitis C Ab Non-reactive Non-reactive   Hepatitis B surface antigen    Collection Time: 04/30/24 11:00 AM   Result Value Ref Range    Hepatitis B Surface Ag Non-reactive Non-reactive   Hepatitis A antibody, IgM    Collection Time: 04/30/24 11:00 AM   Result Value Ref Range    Hep A IgM Non-reactive Non-reactive   CMV DNA, quantitative, PCR    Collection Time: 04/30/24 11:00 AM   Result Value Ref Range    Cytomegalovirus PCR, Quant Not Detected <50 IU/mL    Cytomegalovirus DNA CMV DNA not detected Not Detected   Comprehensive Metabolic Panel (CMP)    Collection Time: 05/01/24  6:31 AM   Result Value Ref Range    Sodium 142 136 - 145 mmol/L    Potassium 3.4 (L) 3.5 - 5.1 mmol/L    Chloride 108 95 - 110 mmol/L    CO2 25 23 - 29 mmol/L    Glucose 84 70 - 110 mg/dL    BUN 15 8 - 23 mg/dL    Creatinine 0.9 0.5 - 1.4 mg/dL    Calcium 9.3 8.7 - 10.5 mg/dL    Total Protein 6.3 6.0 - 8.4 g/dL    Albumin 3.2 (L)  3.5 - 5.2 g/dL    Total Bilirubin 2.4 (H) 0.1 - 1.0 mg/dL    Alkaline Phosphatase 379 (H) 55 - 135 U/L     (H) 10 - 40 U/L     (H) 10 - 44 U/L    eGFR >60 >60 mL/min/1.73 m^2    Anion Gap 9 8 - 16 mmol/L   Magnesium    Collection Time: 05/01/24  6:31 AM   Result Value Ref Range    Magnesium 1.7 1.6 - 2.6 mg/dL   Phosphorus    Collection Time: 05/01/24  6:31 AM   Result Value Ref Range    Phosphorus 3.2 2.7 - 4.5 mg/dL   CBC Without Differential    Collection Time: 05/01/24  6:31 AM   Result Value Ref Range    WBC 4.73 3.90 - 12.70 K/uL    RBC 4.42 4.00 - 5.40 M/uL    Hemoglobin 9.2 (L) 12.0 - 16.0 g/dL    Hematocrit 31.0 (L) 37.0 - 48.5 %    MCV 70 (L) 82 - 98 fL    MCH 20.8 (L) 27.0 - 31.0 pg    MCHC 29.7 (L) 32.0 - 36.0 g/dL    RDW 15.8 (H) 11.5 - 14.5 %    Platelets 181 150 - 450 K/uL    MPV 9.5 9.2 - 12.9 fL       Microbiology Results (last 7 days)       Procedure Component Value Units Date/Time    Urine culture [[id number]] Collected: 04/30/24 0357    Order Status: Completed Specimen: Urine Updated: 05/01/24 0854     Urine Culture, Routine No significant isolate to date    Narrative:      Specimen Source->Urine            Imaging Results              X-Ray Chest 1 View (Final result)  Result time 04/30/24 07:03:30      Final result by Arnulfo Koch MD (04/30/24 07:03:30)                   Impression:      See above      Electronically signed by: Arnulfo Koch MD  Date:    04/30/2024  Time:    07:03               Narrative:    EXAMINATION:  XR CHEST 1 VIEW    CLINICAL HISTORY:  Epigastric pain    TECHNIQUE:  Single frontal view of the chest was performed.    COMPARISON:  08/07/2013    FINDINGS:  Cardiac size is normal.  Lungs are clear and well aerated.                                       US Abdomen Limited (Final result)  Result time 04/30/24 06:23:08      Final result by Parth Ross MD (04/30/24 06:23:08)                   Impression:      1. No definite acute sonographic  findings identified to account for the patient's reported symptoms.  2. Additional details, as provided in the body of the report.      Electronically signed by: Parth Ross  Date:    04/30/2024  Time:    06:23               Narrative:    EXAMINATION:  US ABDOMEN LIMITED    CLINICAL HISTORY:  RUQ;    TECHNIQUE:  Limited ultrasound of the right upper quadrant of the abdomen (including pancreas, liver, gallbladder, common bile duct, and spleen) was performed.    COMPARISON:  None.    FINDINGS:  Liver: Normal in size, measuring 12.8 cm. Fatty liver infiltration. No focal hepatic lesions.    Gallbladder: Status post cholecystectomy.    Biliary system: The common duct is not dilated, measuring 3 mm.  No intrahepatic ductal dilatation.    Spleen: Normal in size and echotexture, measuring 12.7 x 3.9 cm.    Miscellaneous: No upper abdominal ascites.  Visualized region of the pancreas grossly unremarkable.  No evidence of right-sided hydronephrosis.                                       CT Head Without Contrast (Final result)  Result time 04/30/24 02:27:34      Final result by Shan Magallon MD (04/30/24 02:27:34)                   Impression:      No acute intracranial abnormalities.    Sinus disease, as above.      Electronically signed by: Shan Magallon MD  Date:    04/30/2024  Time:    02:27               Narrative:    EXAMINATION:  CT HEAD WITHOUT CONTRAST    CLINICAL HISTORY:  Memory loss;    TECHNIQUE:  Low dose axial images were obtained through the head.  Coronal and sagittal reformations were also performed. Contrast was not administered.    COMPARISON:  None.    FINDINGS:  The brain parenchyma appears normal for age with good corticomedullary differentiation.  There is no evidence of acute infarct, hemorrhage, or mass.  There is ventricular and sulcal enlargement consistent with generalized atrophy.  Mild confluent decreased supratentorial white matter attenuation most likely related to chronic  nonspecific small vessel disease.   No mass-effect or midline shift.  There are no abnormal extra-axial fluid collections.  Mucoperiosteal thickening in the sphenoid sinuses and anterior left ethmoid air cell complex.  Possible air-fluid level left sphenoid sinus.  The remaining visualized paranasal sinuses and mastoid air cells are essentially clear .  The calvarium appears intact. Left phthisis bulbi.                                          Pending Diagnostic Studies:       Procedure Component Value Units Date/Time    DANIEL [[id number]] Collected: 04/30/24 1100    Order Status: Sent Lab Status: In process Updated: 04/30/24 1608    Specimen: Blood     Narrative:      Collection has been rescheduled by CPD at 04/30/2024 11:02 Reason: Pt   not back yet from nuclear med    Alpha 1 Antitrypsin Defiiciency Profile [[id number]] Collected: 04/30/24 1100    Order Status: Sent Lab Status: In process Updated: 04/30/24 1108    Specimen: Blood     Narrative:      Collection has been rescheduled by CPD at 04/30/2024 11:02 Reason: Pt   not back yet from nuclear med    Anti-smooth muscle antibody [[id number]] Collected: 04/30/24 1100    Order Status: Sent Lab Status: In process Updated: 04/30/24 1608    Specimen: Blood     Narrative:      Collection has been rescheduled by CPD at 04/30/2024 11:02 Reason: Pt   not back yet from nuclear med    Antimitochondrial antibody [[id number]] Collected: 04/30/24 1100    Order Status: Sent Lab Status: In process Updated: 04/30/24 1608    Specimen: Blood     Narrative:      Collection has been rescheduled by CPD at 04/30/2024 11:02 Reason: Pt   not back yet from nuclear med    Herpes simplex Virus (HSV) Type 1 & 2 DNA by PCR [[id number]] Collected: 04/30/24 1100    Order Status: Sent Lab Status: In process Updated: 04/30/24 1108    Specimen: Blood     Narrative:      Collection has been rescheduled by CPD at 04/30/2024 11:02 Reason: Pt   not back yet from nuclear med    Phosphatidylethanol  ([facility]) [[id number]] Collected: 04/30/24 1100    Order Status: Sent Lab Status: In process Updated: 04/30/24 1107    Specimen: Blood     Narrative:      Collection has been rescheduled by CPD at 04/30/2024 11:02 Reason: Pt   not back yet from nuclear med           Medications:  Reconciled Home Medications:      Medication List        CONTINUE taking these medications      atenoloL-chlorthalidone 50-25 mg Tab  Commonly known as: TENORETIC  Take 1 tablet by mouth every morning.     atorvastatin 10 MG tablet  Commonly known as: LIPITOR  Take 1 tablet (10 mg total) by mouth once daily.     brimonidine 0.1% 0.1 % Drop  Commonly known as: ALPHAGAN P  INSTILL ONE DROP IN BOTH EYES THREE TIMES DAILY     dorzolamide-timolol 2-0.5% 22.3-6.8 mg/mL ophthalmic solution  Commonly known as: COSOPT  INSTILL 1 DROP IN BOTH EYES THREE TIMES DAILY     fluorometholone 0.1% 0.1 % Drps  Commonly known as: FML  SHAKE LIQUID AND INSTILL 1 DROP IN BOTH EYES EVERY DAY     latanoprost 0.005 % ophthalmic solution  Place 1 drop into both eyes every evening.     LUTEIN ORAL  Take 1 tablet by mouth every morning.     potassium chloride 10 MEQ Tbsr  Commonly known as: KLOR-CON  Take 1 tablet (10 mEq total) by mouth once daily.     UNABLE TO FIND  Take 2 Pieces by mouth once daily. medication name: Willi Brain Gummie              Indwelling Lines/Drains at time of discharge:   Lines/Drains/Airways       None                   Time spent on the discharge of patient: Greater than 35 minutes         Mert Kessler DO  Department of Hospital Medicine  [facility] [place]

## 2024-05-01 NOTE — PLAN OF CARE
Problem: Adult Inpatient Plan of Care  Goal: Plan of Care Review  Outcome: Progressing  Goal: Patient-Specific Goal (Individualized)  Outcome: Progressing  Goal: Absence of Hospital-Acquired Illness or Injury  Outcome: Progressing  Goal: Optimal Comfort and Wellbeing  Outcome: Progressing  Goal: Readiness for Transition of Care  Outcome: Progressing     Problem: Skin Injury Risk Increased  Goal: Skin Health and Integrity  Outcome: Progressing     Problem: Pain Acute  Goal: Optimal Pain Control and Function  Outcome: Progressing

## 2024-05-01 NOTE — ED NOTES
Received report from LATISHA Almendarez. Introduced self at bedside, pt is resting comfortably, denies any pain at this time. Pt is alert, calm, and oriented x4, no acute distress noted.

## 2024-05-01 NOTE — NURSING
Patient received on the floor on stretcher with transporter from ED.Vital signs monitored.Skin intact.Alert oriented and conscious.Blind with left eye.NPO till next order.No tele order.

## 2024-05-01 NOTE — PROVIDER PROGRESS NOTES - EMERGENCY DEPT.
Encounter Date: 4/30/2024    ED Physician Progress Notes        Physician Note:       73-year-old female presenting with epigastric abdominal pain.  History of previous cholecystectomy.  Transaminitis and hyperbilirubinemia 2.2 noted on chemistry panel.  Patient denies any alcohol use.  Denies any Tylenol use.  The patient's symptoms did resolve throughout the ER stay.  Patient had no abdominal tenderness on my re-evaluation.  Ultrasound shows no biliary duct dilation.      Consultation with Gastroenterology, Dr. Latham.  Recommends MRCP in observation for further evaluation by Gastroenterology team.      Differential diagnosis includes biliary duct obstruction, pancreatitis    ECG:  Please check workup area for ECG interpretation.               ED Course as of 04/30/24 2115   Tue Apr 30, 2024   0300 Patient handoff from Dr. Finley to Dr. Harris.  [JM]   0409 EKG 12-lead  Time 3:00 a.m.     Rate 85, sinus, regular rhythm, normal axis.   QRS 70 .  No ST elevation or depression .  T-wave inversion V2.  No hyperacute T-waves.      Normal sinus rhythm nonspecific T-wave inversion. [JM]   0410 BILIRUBIN TOTAL(!): 2.2 [JM]   0410 ALP(!): 464 [JM]   0410 AST(!): 238 [JM]   0410 ALT(!): 246 [JM]   0418 History of cholecystectomy. [JM]   0542 Acetaminophen Level(!): <3.0  Patient is pain-free. [JM]   0632 Gastroenterology consult, Dr. Latham.     Recommends MRCP. GI consult and can get seen.  [JM]      ED Course User Index  [JM] Karlos Harris MD       Final diagnoses:  [R11.2] Nausea and vomiting  [R10.13] Epigastric abdominal pain  [R74.01] Transaminitis (Primary)  [E80.6] Hyperbilirubinemia  [R07.9] Chest pain

## 2024-05-01 NOTE — PLAN OF CARE
05/01/24 1134   Final Note   Assessment Type Final Discharge Note   Anticipated Discharge Disposition Home   Hospital Resources/Appts/Education Provided Appointments scheduled and added to AVS   Post-Acute Status   Post-Acute Authorization Other   Other Status No Post-Acute Service Needs     Pts nurse [facility] notified that the pt can d/c from CM standpoint

## 2024-05-01 NOTE — PROGRESS NOTES
"GI PLAN OF CARE NOTE    No reports of pain, wbc remains normal, bili and lfts trending down. Reached out to Dr Shields with [facility] about case and rec f/u with pancreatic biliary clinic outpatient- request sent. Referral sent to hepatology also. Rec patient f/u with pcp to trend lfts, alk phos and bili until panc/bili or hep follow up.         RLQ pain. Suspected dili. Elevated lfts. Elevated bili. Elevated alk phos.  Plan/ Recommendations:  1. Patient is s/p cholecystectomy in 1990s, a notes in care everywhere says she "still gets bile in the duct". Reports pain has subsided.  ordered MRCP. Patient refused MRCP, reports she is claustrophobic, anti-anxiety medication offered. She refused anti-anxiety meds and refused MRCP again. Reports taking 2 new herbal gummy medications for about 2 months, neuriva (brain and eye) and the other she could not remember.   acute hep panel is non reactive. autoimmune hep serologies in process, rec trend lfts and bili outpatient.       Will sign off  Thank you so much for allowing us to participate in the care of Yahaira Hinson . Please contact us if you have any additional questions.    Flor Hernandez NP  Gastroenterology  [facility] [place] - Med Surg    "

## 2024-05-02 ENCOUNTER — PATIENT OUTREACH (OUTPATIENT)
Dept: ADMINISTRATIVE | Facility: CLINIC | Age: 74
End: 2024-05-02
Payer: MEDICARE

## 2024-05-02 LAB
ANTI SM ANTIBODY: 0.09 RATIO (ref 0–0.99)
ANTI SM/RNP ANTIBODY: 0.08 RATIO (ref 0–0.99)
ANTI-SM INTERPRETATION: NEGATIVE
ANTI-SM/RNP INTERPRETATION: NEGATIVE
ANTI-SSA ANTIBODY: 0.08 RATIO (ref 0–0.99)
ANTI-SSA INTERPRETATION: NEGATIVE
ANTI-SSB ANTIBODY: 0.07 RATIO (ref 0–0.99)
ANTI-SSB INTERPRETATION: NEGATIVE
BACTERIA UR CULT: NORMAL
DSDNA AB SER-ACNC: NORMAL [IU]/ML
OHS QRS DURATION: 80 MS
OHS QTC CALCULATION: 476 MS

## 2024-05-02 NOTE — PROGRESS NOTES
C3 nurse spoke with Yahaira Hinson  for a TCC post hospital discharge follow up call. [facility]/2024 @ to be discussed with NP prior to home visit.    Message sent to PCP staff.

## 2024-05-03 LAB
CLINICAL BIOCHEMIST REVIEW: NORMAL
HSV-1 DNA BY PCR: NEGATIVE
HSV-2 DNA BY PCR: NEGATIVE
PLPETH BLD-MCNC: NORMAL NG/ML
POPETH BLD-MCNC: <10 NG/ML

## 2024-05-07 LAB
A1AT PHENOTYP SERPL-IMP: NORMAL
A1AT SERPL NEPH-MCNC: 178 MG/DL (ref 100–190)

## 2024-05-08 ENCOUNTER — OFFICE VISIT (OUTPATIENT)
Dept: HOME HEALTH SERVICES | Facility: CLINIC | Age: 74
End: 2024-05-08
Payer: MEDICARE

## 2024-05-08 DIAGNOSIS — R74.01 TRANSAMINITIS: Primary | ICD-10-CM

## 2024-05-08 PROCEDURE — 3288F FALL RISK ASSESSMENT DOCD: CPT | Mod: CPTII,S$GLB,, | Performed by: NURSE PRACTITIONER

## 2024-05-08 PROCEDURE — 3074F SYST BP LT 130 MM HG: CPT | Mod: CPTII,S$GLB,, | Performed by: NURSE PRACTITIONER

## 2024-05-08 PROCEDURE — 1101F PT FALLS ASSESS-DOCD LE1/YR: CPT | Mod: CPTII,S$GLB,, | Performed by: NURSE PRACTITIONER

## 2024-05-08 PROCEDURE — 1160F RVW MEDS BY RX/DR IN RCRD: CPT | Mod: CPTII,S$GLB,, | Performed by: NURSE PRACTITIONER

## 2024-05-08 PROCEDURE — 1159F MED LIST DOCD IN RCRD: CPT | Mod: CPTII,S$GLB,, | Performed by: NURSE PRACTITIONER

## 2024-05-08 PROCEDURE — 3078F DIAST BP <80 MM HG: CPT | Mod: CPTII,S$GLB,, | Performed by: NURSE PRACTITIONER

## 2024-05-08 PROCEDURE — 1126F AMNT PAIN NOTED NONE PRSNT: CPT | Mod: CPTII,S$GLB,, | Performed by: NURSE PRACTITIONER

## 2024-05-08 PROCEDURE — 99496 TRANSJ CARE MGMT HIGH F2F 7D: CPT | Mod: S$GLB,,, | Performed by: NURSE PRACTITIONER

## 2024-05-09 ENCOUNTER — HOSPITAL ENCOUNTER (OUTPATIENT)
Facility: HOSPITAL | Age: 74
Discharge: HOME | End: 2024-05-10
Attending: EMERGENCY MEDICINE | Admitting: STUDENT IN AN ORGANIZED HEALTH CARE EDUCATION/TRAINING PROGRAM
Payer: MEDICARE

## 2024-05-09 VITALS
OXYGEN SATURATION: 98 % | SYSTOLIC BLOOD PRESSURE: 125 MMHG | TEMPERATURE: 98 F | HEART RATE: 60 BPM | DIASTOLIC BLOOD PRESSURE: 75 MMHG | RESPIRATION RATE: 15 BRPM

## 2024-05-09 DIAGNOSIS — R79.89 ELEVATED LFTS: ICD-10-CM

## 2024-05-09 DIAGNOSIS — R10.13 EPIGASTRIC PAIN: ICD-10-CM

## 2024-05-09 DIAGNOSIS — R07.9 CHEST PAIN: ICD-10-CM

## 2024-05-09 DIAGNOSIS — E80.6 HYPERBILIRUBINEMIA: ICD-10-CM

## 2024-05-09 DIAGNOSIS — E83.42 HYPOMAGNESEMIA: Primary | ICD-10-CM

## 2024-05-09 DIAGNOSIS — E87.6 HYPOKALEMIA: ICD-10-CM

## 2024-05-09 DIAGNOSIS — R74.01 TRANSAMINITIS: ICD-10-CM

## 2024-05-09 PROBLEM — E78.5 DYSLIPIDEMIA: Chronic | Status: ACTIVE | Noted: 2019-05-29

## 2024-05-09 PROBLEM — N18.31 STAGE 3A CHRONIC KIDNEY DISEASE: Chronic | Status: ACTIVE | Noted: 2019-12-03

## 2024-05-09 PROBLEM — I10 ESSENTIAL HYPERTENSION: Chronic | Status: ACTIVE | Noted: 2019-05-29

## 2024-05-09 LAB
ALBUMIN SERPL BCP-MCNC: 3.4 G/DL (ref 3.5–5.2)
ALLENS TEST: ABNORMAL
ALP SERPL-CCNC: 516 U/L (ref 55–135)
ALT SERPL W/O P-5'-P-CCNC: 274 U/L (ref 10–44)
ANION GAP SERPL CALC-SCNC: 11 MMOL/L (ref 8–16)
ANION GAP SERPL CALC-SCNC: 18 MMOL/L (ref 8–16)
APTT PPP: 22.1 SEC (ref 21–32)
AST SERPL-CCNC: 335 U/L (ref 10–40)
BASOPHILS # BLD AUTO: 0.02 K/UL (ref 0–0.2)
BASOPHILS NFR BLD: 0.2 % (ref 0–1.9)
BILIRUB SERPL-MCNC: 2.5 MG/DL (ref 0.1–1)
BILIRUB UR QL STRIP: NEGATIVE
BUN SERPL-MCNC: 21 MG/DL (ref 6–30)
BUN SERPL-MCNC: 21 MG/DL (ref 8–23)
CALCIUM SERPL-MCNC: 8.8 MG/DL (ref 8.7–10.5)
CHLORIDE SERPL-SCNC: 103 MMOL/L (ref 95–110)
CHLORIDE SERPL-SCNC: 97 MMOL/L (ref 95–110)
CLARITY UR: CLEAR
CO2 SERPL-SCNC: 24 MMOL/L (ref 23–29)
COLOR UR: YELLOW
CREAT SERPL-MCNC: 0.9 MG/DL (ref 0.5–1.4)
CREAT SERPL-MCNC: 1.1 MG/DL (ref 0.5–1.4)
DELSYS: ABNORMAL
DIFFERENTIAL METHOD BLD: ABNORMAL
EOSINOPHIL # BLD AUTO: 0 K/UL (ref 0–0.5)
EOSINOPHIL NFR BLD: 0.2 % (ref 0–8)
ERYTHROCYTE [DISTWIDTH] IN BLOOD BY AUTOMATED COUNT: 15.3 % (ref 11.5–14.5)
EST. GFR  (NO RACE VARIABLE): >60 ML/MIN/1.73 M^2
ETHANOL SERPL-MCNC: <10 MG/DL
GLUCOSE SERPL-MCNC: 107 MG/DL (ref 70–110)
GLUCOSE SERPL-MCNC: 123 MG/DL (ref 70–110)
GLUCOSE UR QL STRIP: NEGATIVE
HCT VFR BLD AUTO: 31.1 % (ref 37–48.5)
HCT VFR BLD CALC: 34 %PCV (ref 36–54)
HGB BLD-MCNC: 9.6 G/DL (ref 12–16)
HGB UR QL STRIP: NEGATIVE
IMM GRANULOCYTES # BLD AUTO: 0.04 K/UL (ref 0–0.04)
IMM GRANULOCYTES NFR BLD AUTO: 0.4 % (ref 0–0.5)
INR PPP: 1 (ref 0.8–1.2)
KETONES UR QL STRIP: NEGATIVE
LACTATE SERPL-SCNC: 0.7 MMOL/L (ref 0.5–2.2)
LEUKOCYTE ESTERASE UR QL STRIP: NEGATIVE
LIPASE SERPL-CCNC: 124 U/L (ref 4–60)
LYMPHOCYTES # BLD AUTO: 0.5 K/UL (ref 1–4.8)
LYMPHOCYTES NFR BLD: 4.7 % (ref 18–48)
MAGNESIUM SERPL-MCNC: 1.5 MG/DL (ref 1.6–2.6)
MCH RBC QN AUTO: 20.9 PG (ref 27–31)
MCHC RBC AUTO-ENTMCNC: 30.9 G/DL (ref 32–36)
MCV RBC AUTO: 68 FL (ref 82–98)
MODE: ABNORMAL
MONOCYTES # BLD AUTO: 0.5 K/UL (ref 0.3–1)
MONOCYTES NFR BLD: 4.2 % (ref 4–15)
NEUTROPHILS # BLD AUTO: 10.1 K/UL (ref 1.8–7.7)
NEUTROPHILS NFR BLD: 90.3 % (ref 38–73)
NITRITE UR QL STRIP: NEGATIVE
NRBC BLD-RTO: 0 /100 WBC
PH UR STRIP: 8.5 [PH] (ref 5–8)
PLATELET # BLD AUTO: 177 K/UL (ref 150–450)
PMV BLD AUTO: 9.8 FL (ref 9.2–12.9)
POC IONIZED CALCIUM: 1.23 MMOL/L (ref 1.06–1.42)
POC TCO2 (MEASURED): 26 MMOL/L (ref 23–29)
POTASSIUM BLD-SCNC: 2.8 MMOL/L (ref 3.5–5.1)
POTASSIUM SERPL-SCNC: 2.7 MMOL/L (ref 3.5–5.1)
PROT SERPL-MCNC: 6.6 G/DL (ref 6–8.4)
PROT UR QL STRIP: NEGATIVE
PROTHROMBIN TIME: 11.1 SEC (ref 9–12.5)
RBC # BLD AUTO: 4.59 M/UL (ref 4–5.4)
SAMPLE: ABNORMAL
SITE: ABNORMAL
SODIUM BLD-SCNC: 137 MMOL/L (ref 136–145)
SODIUM SERPL-SCNC: 138 MMOL/L (ref 136–145)
SP GR UR STRIP: 1.01 (ref 1–1.03)
URN SPEC COLLECT METH UR: NORMAL
UROBILINOGEN UR STRIP-ACNC: 2 EU/DL
WBC # BLD AUTO: 11.15 K/UL (ref 3.9–12.7)

## 2024-05-09 PROCEDURE — 25000003 PHARM REV CODE 250: Performed by: HOSPITALIST

## 2024-05-09 PROCEDURE — 82565 ASSAY OF CREATININE: CPT

## 2024-05-09 PROCEDURE — 80053 COMPREHEN METABOLIC PANEL: CPT | Performed by: EMERGENCY MEDICINE

## 2024-05-09 PROCEDURE — 96375 TX/PRO/DX INJ NEW DRUG ADDON: CPT

## 2024-05-09 PROCEDURE — 85730 THROMBOPLASTIN TIME PARTIAL: CPT | Performed by: EMERGENCY MEDICINE

## 2024-05-09 PROCEDURE — 82330 ASSAY OF CALCIUM: CPT

## 2024-05-09 PROCEDURE — G0378 HOSPITAL OBSERVATION PER HR: HCPCS

## 2024-05-09 PROCEDURE — 87040 BLOOD CULTURE FOR BACTERIA: CPT | Performed by: EMERGENCY MEDICINE

## 2024-05-09 PROCEDURE — 99285 EMERGENCY DEPT VISIT HI MDM: CPT | Mod: 25

## 2024-05-09 PROCEDURE — 25000003 PHARM REV CODE 250: Performed by: EMERGENCY MEDICINE

## 2024-05-09 PROCEDURE — 83690 ASSAY OF LIPASE: CPT | Performed by: EMERGENCY MEDICINE

## 2024-05-09 PROCEDURE — 25500020 PHARM REV CODE 255: Performed by: EMERGENCY MEDICINE

## 2024-05-09 PROCEDURE — 83605 ASSAY OF LACTIC ACID: CPT | Performed by: EMERGENCY MEDICINE

## 2024-05-09 PROCEDURE — 85610 PROTHROMBIN TIME: CPT | Performed by: EMERGENCY MEDICINE

## 2024-05-09 PROCEDURE — 82077 ASSAY SPEC XCP UR&BREATH IA: CPT | Performed by: EMERGENCY MEDICINE

## 2024-05-09 PROCEDURE — 81003 URINALYSIS AUTO W/O SCOPE: CPT | Mod: 59 | Performed by: EMERGENCY MEDICINE

## 2024-05-09 PROCEDURE — 99900035 HC TECH TIME PER 15 MIN (STAT)

## 2024-05-09 PROCEDURE — 96365 THER/PROPH/DIAG IV INF INIT: CPT

## 2024-05-09 PROCEDURE — 84295 ASSAY OF SERUM SODIUM: CPT

## 2024-05-09 PROCEDURE — 85014 HEMATOCRIT: CPT

## 2024-05-09 PROCEDURE — 85025 COMPLETE CBC W/AUTO DIFF WBC: CPT | Performed by: EMERGENCY MEDICINE

## 2024-05-09 PROCEDURE — 63600175 PHARM REV CODE 636 W HCPCS: Performed by: EMERGENCY MEDICINE

## 2024-05-09 PROCEDURE — 83735 ASSAY OF MAGNESIUM: CPT | Performed by: EMERGENCY MEDICINE

## 2024-05-09 PROCEDURE — 84132 ASSAY OF SERUM POTASSIUM: CPT

## 2024-05-09 RX ORDER — SIMETHICONE 80 MG
1 TABLET,CHEWABLE ORAL 4 TIMES DAILY PRN
Status: DISCONTINUED | OUTPATIENT
Start: 2024-05-09 | End: 2024-05-10

## 2024-05-09 RX ORDER — POLYETHYLENE GLYCOL 3350 17 G/17G
17 POWDER, FOR SOLUTION ORAL DAILY
Status: DISCONTINUED | OUTPATIENT
Start: 2024-05-10 | End: 2024-05-10 | Stop reason: HOSPADM

## 2024-05-09 RX ORDER — ONDANSETRON HYDROCHLORIDE 2 MG/ML
4 INJECTION, SOLUTION INTRAVENOUS EVERY 8 HOURS PRN
Status: DISCONTINUED | OUTPATIENT
Start: 2024-05-09 | End: 2024-05-10 | Stop reason: HOSPADM

## 2024-05-09 RX ORDER — PROCHLORPERAZINE EDISYLATE 5 MG/ML
5 INJECTION INTRAMUSCULAR; INTRAVENOUS EVERY 6 HOURS PRN
Status: DISCONTINUED | OUTPATIENT
Start: 2024-05-09 | End: 2024-05-10 | Stop reason: HOSPADM

## 2024-05-09 RX ORDER — ACETAMINOPHEN 325 MG/1
650 TABLET ORAL EVERY 6 HOURS PRN
Status: DISCONTINUED | OUTPATIENT
Start: 2024-05-09 | End: 2024-05-10 | Stop reason: HOSPADM

## 2024-05-09 RX ORDER — SODIUM CHLORIDE 0.9 % (FLUSH) 0.9 %
10 SYRINGE (ML) INJECTION EVERY 8 HOURS PRN
Status: DISCONTINUED | OUTPATIENT
Start: 2024-05-09 | End: 2024-05-10 | Stop reason: HOSPADM

## 2024-05-09 RX ORDER — NALOXONE HCL 0.4 MG/ML
0.02 VIAL (ML) INJECTION
Status: DISCONTINUED | OUTPATIENT
Start: 2024-05-09 | End: 2024-05-10 | Stop reason: HOSPADM

## 2024-05-09 RX ORDER — TALC
6 POWDER (GRAM) TOPICAL NIGHTLY PRN
Status: DISCONTINUED | OUTPATIENT
Start: 2024-05-09 | End: 2024-05-10 | Stop reason: HOSPADM

## 2024-05-09 RX ORDER — POTASSIUM CHLORIDE 7.45 MG/ML
10 INJECTION INTRAVENOUS ONCE
Qty: 100 ML | Refills: 0 | Status: COMPLETED | OUTPATIENT
Start: 2024-05-09 | End: 2024-05-09

## 2024-05-09 RX ORDER — ALUMINUM HYDROXIDE, MAGNESIUM HYDROXIDE, AND SIMETHICONE 1200; 120; 1200 MG/30ML; MG/30ML; MG/30ML
30 SUSPENSION ORAL 4 TIMES DAILY PRN
Status: DISCONTINUED | OUTPATIENT
Start: 2024-05-09 | End: 2024-05-10

## 2024-05-09 RX ORDER — IPRATROPIUM BROMIDE AND ALBUTEROL SULFATE 2.5; .5 MG/3ML; MG/3ML
3 SOLUTION RESPIRATORY (INHALATION) EVERY 4 HOURS PRN
Status: DISCONTINUED | OUTPATIENT
Start: 2024-05-09 | End: 2024-05-10 | Stop reason: HOSPADM

## 2024-05-09 RX ORDER — POTASSIUM CHLORIDE 750 MG/1
10 TABLET, EXTENDED RELEASE ORAL
Status: COMPLETED | OUTPATIENT
Start: 2024-05-09 | End: 2024-05-09

## 2024-05-09 RX ORDER — MAGNESIUM SULFATE 1 G/100ML
1 INJECTION INTRAVENOUS
Status: COMPLETED | OUTPATIENT
Start: 2024-05-09 | End: 2024-05-09

## 2024-05-09 RX ORDER — GLUCAGON 1 MG
1 KIT INJECTION
Status: DISCONTINUED | OUTPATIENT
Start: 2024-05-09 | End: 2024-05-10 | Stop reason: HOSPADM

## 2024-05-09 RX ORDER — IBUPROFEN 600 MG/1
600 TABLET ORAL
Status: COMPLETED | OUTPATIENT
Start: 2024-05-09 | End: 2024-05-09

## 2024-05-09 RX ORDER — ATENOLOL 50 MG/1
50 TABLET ORAL DAILY
Status: DISCONTINUED | OUTPATIENT
Start: 2024-05-10 | End: 2024-05-10 | Stop reason: HOSPADM

## 2024-05-09 RX ORDER — IBUPROFEN 200 MG
24 TABLET ORAL
Status: DISCONTINUED | OUTPATIENT
Start: 2024-05-09 | End: 2024-05-10 | Stop reason: HOSPADM

## 2024-05-09 RX ORDER — SODIUM CHLORIDE 9 MG/ML
INJECTION, SOLUTION INTRAVENOUS CONTINUOUS
Status: DISCONTINUED | OUTPATIENT
Start: 2024-05-09 | End: 2024-05-10 | Stop reason: HOSPADM

## 2024-05-09 RX ORDER — IBUPROFEN 200 MG
16 TABLET ORAL
Status: DISCONTINUED | OUTPATIENT
Start: 2024-05-09 | End: 2024-05-10 | Stop reason: HOSPADM

## 2024-05-09 RX ADMIN — IOHEXOL 75 ML: 350 INJECTION, SOLUTION INTRAVENOUS at 05:05

## 2024-05-09 RX ADMIN — POTASSIUM CHLORIDE 10 MEQ: 7.46 INJECTION, SOLUTION INTRAVENOUS at 06:05

## 2024-05-09 RX ADMIN — MAGNESIUM SULFATE 1 G: 1 INJECTION INTRAVENOUS at 05:05

## 2024-05-09 RX ADMIN — POTASSIUM BICARBONATE 25 MEQ: 977.5 TABLET, EFFERVESCENT ORAL at 10:05

## 2024-05-09 RX ADMIN — POTASSIUM CHLORIDE 10 MEQ: 750 TABLET, EXTENDED RELEASE ORAL at 04:05

## 2024-05-09 RX ADMIN — SODIUM CHLORIDE: 9 INJECTION, SOLUTION INTRAVENOUS at 07:05

## 2024-05-09 RX ADMIN — IBUPROFEN 600 MG: 600 TABLET ORAL at 05:05

## 2024-05-09 NOTE — ED PROVIDER NOTES
Encounter Date: 5/9/2024    SCRIBE #1 NOTE: I, Cassidy Li, am scribing for, and in the presence of,  Mely Lopez MD.       History     Chief Complaint   Patient presents with    Abdominal Pain     Pt reports abdominal pain and distention. Symptoms started shortly after eating breakfast around 8am. Pt rated her abd pain a 10/10 pain. Pt recently seen here on 4/30 and dx with elevated liver enzymes. +pale skin coloring. Hx of glaucoma.       Mrs. Hinson reports earlier today, she started experiencing epigastric abdominal pain and abdominal distension, around 1h after eating food. States her pain is currently resolving. She also reports generalized weakness, fatigue. Not passing gas. Per  at bedside he states she has been more forgetful recently than her previous baseline. No leg pain or swelling.     Per chart review, patient was admitted at this facility on 4/30/24 for RUQ and epigastric abdominal pain. Labs significant for transaminitis with AST/ALT at 234/280 respectively.  Bilirubin elevated at 2.8, direct bilirubin 2.0.  Chest x-ray with no acute process.  Ultrasound abdomen with normal common duct and no intrahepatic ductal dilation.  Status post cholecystectomy.  MRCP recommended, patient refused.      Today, pt agrees to stay for any testing needed. On exam in ED room, pt reports pain has markedly improved compared w prior to arrival and minutes prior.     The history is provided by the patient.     Review of patient's allergies indicates:  No Known Allergies  Past Medical History:   Diagnosis Date    Cataract     Glaucoma (increased eye pressure)     H/O bilateral oophorectomy     Hx of cholecystectomy     Hypertension     Pure hypercholesterolemia     Thalassemia      Past Surgical History:   Procedure Laterality Date    BILATERAL OOPHORECTOMY      for cysts    CATARACT EXTRACTION Right     about 1998    CHOLECYSTECTOMY  1998    AK REMOVAL OF OVARY/TUBE(S)      TRABECULECTOMY Right 6/20/2019     Procedure: G6/MP3 LASER;  Surgeon: Doroteo Beaver MD;  Location: [facility] OR [address] [place] [address];  Service: Ophthalmology;  Laterality: Right;     Family History   Problem Relation Name Age of Onset    Asthma Father      Arthritis Father      Ovarian cancer Mother      Hypertension Sister      No Known Problems Brother      Breast cancer Maternal Grandmother      No Known Problems Brother      No Known Problems Brother      Colon cancer Neg Hx       Social History     Tobacco Use    Smoking status: Former     Current packs/day: 0.00     Average packs/day: 0.3 packs/day for 11.0 years (3.3 ttl pk-yrs)     Types: Cigarettes     Start date: 1996     Quit date: 2007     Years since quittin.7    Smokeless tobacco: Never   Substance Use Topics    Alcohol use: Not Currently     Comment: social drinker    Drug use: No     Review of Systems   Constitutional:  Positive for fatigue.   Respiratory:  Negative for chest tightness and shortness of breath.    Cardiovascular:  Negative for chest pain and leg swelling.   Gastrointestinal:  Positive for abdominal distention, abdominal pain and nausea. Negative for vomiting.   Musculoskeletal:         Negative leg pain    Neurological:  Positive for weakness.        Generalized   All other systems reviewed and are negative.      Physical Exam     Initial Vitals [24 1457]   BP Pulse Resp Temp SpO2   136/76 76 16 98.5 °F (36.9 °C) 98 %      MAP       --         Physical Exam    Nursing note and vitals reviewed.  Constitutional: She appears well-developed and well-nourished. She is not diaphoretic. No distress.   HENT:   Head: Normocephalic and atraumatic.   Eyes: EOM are normal.   Prosthetic L eye    Cardiovascular:  Normal rate, regular rhythm and normal heart sounds.           No murmur heard.  Pulmonary/Chest: Breath sounds normal. No respiratory distress.   Abdominal: Abdomen is soft. Bowel sounds are normal. She exhibits no distension and no mass. There is no abdominal  tenderness.   Abdomen soft and nontender  There is no guarding.   Musculoskeletal:         General: No tenderness or edema. Normal range of motion.     Neurological: She is oriented to person, place, and time. GCS score is 15. GCS eye subscore is 4. GCS verbal subscore is 5. GCS motor subscore is 6.   Fully lucid and linear    Skin: Skin is warm and dry. No rash noted. No erythema.   Mildly pale appearing    Psychiatric: She has a normal mood and affect. Her behavior is normal. Judgment and thought content normal.         ED Course   Critical Care    Date/Time: 5/10/2024 10:41 AM    Performed by: Mely Lopez MD  Authorized by: Jeremy Cortez MD  Direct patient critical care time: 10 minutes  Additional history critical care time: 5 minutes  Ordering / reviewing critical care time: 5 minutes  Documentation critical care time: 5 minutes  Consulting other physicians critical care time: 5 minutes  Total critical care time (exclusive of procedural time) : 30 minutes        Labs Reviewed   CBC W/ AUTO DIFFERENTIAL - Abnormal; Notable for the following components:       Result Value    Hemoglobin 9.6 (*)     Hematocrit 31.1 (*)     MCV 68 (*)     MCH 20.9 (*)     MCHC 30.9 (*)     RDW 15.3 (*)     Gran # (ANC) 10.1 (*)     Lymph # 0.5 (*)     Gran % 90.3 (*)     Lymph % 4.7 (*)     All other components within normal limits   COMPREHENSIVE METABOLIC PANEL - Abnormal; Notable for the following components:    Potassium 2.7 (*)     Albumin 3.4 (*)     Total Bilirubin 2.5 (*)     Alkaline Phosphatase 516 (*)      (*)      (*)     All other components within normal limits    Narrative:     potassium  critical result(s) called and verbal readback obtained   from [facility] by ARIEL 05/09/2024 16:21   LIPASE - Abnormal; Notable for the following components:    Lipase 124 (*)     All other components within normal limits   MAGNESIUM - Abnormal; Notable for the following components:    Magnesium 1.5 (*)      All other components within normal limits   ISTAT PROCEDURE - Abnormal; Notable for the following components:    POC Glucose 123 (*)     POC Potassium 2.8 (*)     POC Anion Gap 18 (*)     POC Hematocrit 34 (*)     All other components within normal limits   URINALYSIS, REFLEX TO URINE CULTURE    Narrative:     Specimen Source->Urine   APTT   PROTIME-INR   MAGNESIUM   ALCOHOL,MEDICAL (ETHANOL)   LACTIC ACID, PLASMA   ALCOHOL,MEDICAL (ETHANOL)   ISTAT CHEM8          Imaging Results              CT Abdomen Pelvis With IV Contrast NO Oral Contrast (Final result)  Result time 05/09/24 17:34:57      Final result by Eliana Krishnamurthy MD (05/09/24 17:34:57)                   Impression:      Mild fatty infiltration of the liver.  Mild pneumobilia.  Cholecystectomy.    Colonic diverticulosis.      Electronically signed by: Eliana Krishnamurthy  Date:    05/09/2024  Time:    17:34               Narrative:    EXAMINATION:  CT OF ABDOMEN PELVIS WITH    CLINICAL HISTORY:  Abdominal pain, acute, nonlocalized;epigastric pain, elevated LFTs;    TECHNIQUE:  5 mm enhanced axial images were obtained from the lung bases through the greater trochanters.  75 mL of Omnipaque 350 was injected.    COMPARISON:  None.    FINDINGS:  The liver is not enlarged.  There is mild fatty infiltration of the liver.  Mild pneumobilia is present.    Spleen, pancreas, kidneys, and adrenal glands are unremarkable.    The gallbladder is surgically absent.    There is no definite evidence for abdominal adenopathy or ascites.  A tiny fat containing umbilical hernia is present.  Mild vascular calcifications are present.    There are no pelvic masses or adenopathy.  Colonic diverticulosis is seen.  A couple of ingested tablets or radiopaque foreign bodies are seen in the distal ileum and the distal transverse colon (series 2 axial image 121 and 48).  The appendix is not inflamed.    There is no free fluid in the pelvis.    There is mild bibasilar  atelectasis.    There is grade 1 anterolisthesis of L4 on L5.  Facet arthrosis is greatest at the L4/L5 and L5/S1.                                       Medications   atenoloL tablet 50 mg (50 mg Oral Given 5/10/24 0852)   sodium chloride 0.9% flush 10 mL (has no administration in time range)   albuterol-ipratropium 2.5 mg-0.5 mg/3 mL nebulizer solution 3 mL (has no administration in time range)   melatonin tablet 6 mg (has no administration in time range)   ondansetron injection 4 mg (has no administration in time range)   prochlorperazine injection Soln 5 mg (has no administration in time range)   polyethylene glycol packet 17 g (17 g Oral Not Given 5/10/24 0900)   acetaminophen tablet 650 mg (has no administration in time range)   naloxone 0.4 mg/mL injection 0.02 mg (has no administration in time range)   glucose chewable tablet 16 g (has no administration in time range)   glucose chewable tablet 24 g (has no administration in time range)   glucagon (human recombinant) injection 1 mg (has no administration in time range)   0.9%  NaCl infusion ( Intravenous New Bag 5/9/24 1915)   dextrose 10% bolus 125 mL 125 mL (has no administration in time range)   dextrose 10% bolus 250 mL 250 mL (has no administration in time range)   aluminum-magnesium hydroxide-simethicone 200-200-20 mg/5 mL suspension 30 mL (30 mLs Oral Not Given 5/10/24 1100)   potassium chloride SA CR tablet 40 mEq (40 mEq Oral Given 5/10/24 0852)   potassium chloride SA CR tablet 10 mEq (10 mEq Oral Given 5/9/24 1628)   potassium chloride 10 mEq in 100 mL IVPB (10 mEq Intravenous New Bag 5/9/24 1809)   magnesium sulfate in dextrose IVPB (premix) 1 g (0 g Intravenous Stopped 5/9/24 1839)   iohexoL (OMNIPAQUE 350) injection 75 mL (75 mLs Intravenous Given 5/9/24 1714)   ibuprofen tablet 600 mg (600 mg Oral Given 5/9/24 1749)   potassium bicarbonate disintegrating tablet 25 mEq (25 mEq Oral Given 5/9/24 2201)     Medical Decision Making  Prior records  reviewed. Pt is in no acute distress, currently reports pain resolved.   Labs noted, hypokalemia, hypomagnesemia. Will replace.   Lfts mildly increased from recent discharge. Will admit to hospital for completion of prior initiated work up. No pain medications given in ED, pt denies pain.   Other labs noted, no other acute abnl, will need trending.   Blood cxs, lactate added when pt developed mild temp 100.3.   Will admit for further evaluation and care, have discussed w HM who will assume care upon admission.     Amount and/or Complexity of Data Reviewed  Independent Historian: spouse     Details: See hpi   Labs: ordered. Decision-making details documented in ED Course.  Radiology: ordered.    Risk  Prescription drug management.            Scribe Attestation:   Scribe #1: I performed the above scribed service and the documentation accurately describes the services I performed. I attest to the accuracy of the note.                           I, Mely Lopez MD, personally performed the services described in this documentation. All medical record entries made by the scribe were at my direction and in my presence. I have reviewed the chart and agree that the record reflects my personal performance and is accurate and complete.      Clinical Impression:  Final diagnoses:  [E87.6] Hypokalemia  [E83.42] Hypomagnesemia (Primary)  [R79.89] Elevated LFTs  [R10.13] Epigastric pain - resolved.           ED Disposition Condition    Observation                 Mely Lopez MD  05/10/24 9131

## 2024-05-09 NOTE — ADMISSIONCARE
AdmissionCare    Guideline: General Observation, Observation    Based on the indications selected for the patient, the bed status of Observation was determined to be MET    The following indications were selected as present at the time of evaluation of the patient:      - Clinical care (eg, testing, monitoring, or treatment) needed beyond the usual emergency department time frame (eg, 3 to 4 hours)   - Clinical care needed is not appropriate for a lower level of care (ie, discharge to outpatient setting not appropriate).   - Patient has clinical condition for which observation care is needed, as indicated by 1 or more of the following:    - Gastroenterologic condition or finding (eg, suspected ileus or obstruction, fecal impaction, constipation, gastroparesis, appendicitis or other abdominal infection, peptic ulcer disease without suspected bleeding, hepatitis)    AdmissionCare documentation entered by: Wayne Jones    [facility], 27th edition, Copyright © 2023 [facility] [employer], [facility] All Rights Reserved.  [id number]:17:16-05:00

## 2024-05-09 NOTE — PROGRESS NOTES
[facility]  Transitional Care Management (TCM) Home Visit    Encounter Provider: Bam Johnson   PCP: Raul Adkins MD  Consult Requested By: No ref. provider found  Admit Date: 4/30/24   IP Discharge Date: 5/1/24  Hospital Length of Stay: 1 day  Days since discharge (from IP or SNF): 7 days  [facility] Contact Note: 5/2/24  Hospital Diagnosis: No admission diagnoses are documented for this encounter.     HISTORY OF PRESENT ILLNESS      Patient ID: Yahaira Hinson is a 73 y.o. female was recently admitted to the hospital, this is their TCM encounter.    Hospital Course Synopsis:    73-year-old female with past medical history of hypertension and hyperlipidemia admitted on 04/30/2024 for further evaluation of acute transaminitis.  Acute hepatitis panel nonreactive.  Recommended MRCP, but patient politely declined.  She reports she is claustrophobic, anti-anxiety medication offered. She refused anti-anxiety meds and refused MRCP again.  Patient's symptoms of abdominal pain has resolved prior to admission.  LFTs and bilirubin trended down.  Patient without fevers or leukocytosis.  GI consulted and discussed case with Dr Shields with [facility]. GI Rec f/u with pancreatic biliary clinic outpatient- request sent. Referral sent to hepatology also.      Pt denies any fever, headaches, vision changes, chest pain, shortness of breath, palpitations, abdominal pain, nausea, vomiting, or any new weaknesses. Feels ready to go home. Patient's exam on discharge was as follow: Patient is alert and oriented, appears in no acute distress, heart with regular rate and rhythm, lungs clear to asculation with non-labored breathing, abdomen soft, and no new weaknesses or focal deficits seen. Bilateral lower extremities without any edema or calf tenderness.      Patient was counseled regarding any abnormal labs, differential diagnosis, treatment options, risk-benefit, lifestyle changes,  prognosis, current condition, and medications. Patient was interactive and attentive.  Patient's questions were answered in a respectful and timely manner. Patient was instructed to follow-up with PCP within 1 week and to continue taking medications as prescribed.  Instructed to also follow up with hepatology and advanced endoscopy team in the pancreatic clinic.Rec patient f/u with pcp to trend lfts, alk phos and bili until panc/bili or hep follow up. Also, extensively discussed the risks, benefits, and side effects of patient's medications. Discussed with patient about any medication changes. Patient verbalized understanding and agrees to treatment plan.  Patient is stable for discharge.  Patient has no other questions or concerns at this time.  ED precautions discussed with the patient.     Vital signs are stable. Ambulating without any difficulty. Tolerating p.o. intake without any nausea or vomiting. Afebrile for over 24 hours. Patient is in stable condition and has no questions or concerns. Patient will be discharge to home once transportation secured. CM/SW to assist with discharge planning.     DECISION MAKING TODAY       Assessment & Plan:  1. Transaminitis  Assessment & Plan:  --denies nausea, abdominal pain, vomiting since hospital discharge  --refusing to follow up with GI; education done  --compliant with current medications  --reports seeing PCP next week for possible labs  --endorses normal bowel movements and adequate appetite           Medication List on Discharge:     Medication List            Accurate as of May 8, 2024 11:59 PM. If you have any questions, ask your nurse or doctor.                CONTINUE taking these medications      atenoloL-chlorthalidone 50-25 mg Tab  Commonly known as: TENORETIC  Take 1 tablet by mouth every morning.     atorvastatin 10 MG tablet  Commonly known as: LIPITOR  Take 1 tablet (10 mg total) by mouth once daily.     brimonidine 0.1% 0.1 % Drop  Commonly known as:  ALPHAGAN P  INSTILL ONE DROP IN BOTH EYES THREE TIMES DAILY     dorzolamide-timolol 2-0.5% 22.3-6.8 mg/mL ophthalmic solution  Commonly known as: COSOPT  INSTILL 1 DROP IN BOTH EYES THREE TIMES DAILY     fluorometholone 0.1% 0.1 % Drps  Commonly known as: FML  SHAKE LIQUID AND INSTILL 1 DROP IN BOTH EYES EVERY DAY     latanoprost 0.005 % ophthalmic solution  Place 1 drop into both eyes every evening.     LUTEIN ORAL  Take 1 tablet by mouth every morning.     potassium chloride 10 MEQ Tbsr  Commonly known as: KLOR-CON  Take 1 tablet (10 mEq total) by mouth once daily.     UNABLE TO FIND  Take 2 Pieces by mouth once daily. medication name: Willi Flores              Medication Reconciliation:  Were medications changed on discharge? Yes  Were medications in the home? Yes  Is the patient taking the medications as directed? Yes  Does the patient understand the medications and changes? Yes  Does updated med list accurately reflects meds patient is currently taking? Yes    ENVIRONMENT OF CARE      Family and/or Caregiver present at visit?  Yes  Name of Caregiver: SO  History provided by: patient and caregiver    Advance Care Planning   Advanced Care Planning Status:  Patient does not have an ACP conversation on file  Living Will: No  Power of : No  [place]: No  Impression upon entering the home:  Physical Dwelling: home  Appearance of home environment: cleaniness: clean  Functional Status: independent  Mobility: ambulatory  Nutritional access: adequate intake and access  Home Health: No, and does not need it at this time   DME/Supplies: none     Diagnostic tests reviewed/disposition: No diagnosic tests pending after this hospitalization.  Disease/illness education:  transaminitis  Establishment or re-establishment of referral orders for community resources: No other necessary community resources.   Discussion with other health care providers: No discussion with other health care providers necessary.   Does  patient have a PCP at [facility]? Yes   Repatriation plan with PCP? follow-up with PCP within 90d   Does patient have an ostomy (ileostomy, colostomy, suprapubic catheter, nephrostomy tube, tracheostomy, PEG tube, pleurex catheter, cholecystostomy, etc)? No  Were BPAs reviewed? 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   Tobacco Use    Smoking status: Former     Current packs/day: 0.00     Average packs/day: 0.3 packs/day for 11.0 years (3.3 ttl pk-yrs)     Types: Cigarettes     Start date: 1996     Quit date: 2007     Years since quittin.7    Smokeless tobacco: Never   Substance and Sexual Activity    Alcohol use: Not Currently     Comment: social drinker    Drug use: No    Sexual activity: Yes     Partners: Male     Birth control/protection: Post-menopausal     Social Determinants of Health     Financial Resource Strain: Low Risk  (2019)    Overall Financial Resource Strain (CARDIA)     Difficulty of Paying Living Expenses: Not hard at all   Food Insecurity: No Food Insecurity (2019)    Hunger Vital Sign     Worried About Running Out of Food in the Last Year: Never true     Ran Out of Food in the Last Year: Never true   Transportation Needs: No Transportation Needs (2019)    PRAPARE - Transportation     Lack of Transportation (Medical): No     Lack of Transportation (Non-Medical): No       OBJECTIVE:     Vital Signs:  Vitals:    24 1047   BP: 125/75   Pulse: 60   Resp: 15   Temp: 98 °F (36.7 °C)       Review of Systems   Constitutional:  Negative for activity change and appetite change.   HENT:  Negative for congestion and dental problem.    Eyes:  Negative for discharge and itching.   Respiratory:  Negative for choking and chest tightness.    Cardiovascular:  Negative for chest pain and palpitations.   Gastrointestinal:   Negative for rectal pain and vomiting.   Endocrine: Negative for cold intolerance and heat intolerance.   Genitourinary:  Negative for enuresis and flank pain.   Musculoskeletal:  Negative for myalgias and neck pain.   Skin:  Negative for color change and wound.   Allergic/Immunologic: Negative for environmental allergies and food allergies.   Neurological:  Negative for tremors and syncope.   Hematological:  Does not bruise/bleed easily.   Psychiatric/Behavioral:  Negative for decreased concentration and dysphoric mood.      Physical Exam:  Physical Exam  Vitals and nursing note reviewed.   HENT:      Head: Normocephalic.   Eyes:      General: Lids are normal.   Cardiovascular:      Rate and Rhythm: Normal rate.   Pulmonary:      Effort: Pulmonary effort is normal.      Breath sounds: Normal breath sounds and air entry.   Abdominal:      General: There is no distension.      Palpations: Abdomen is soft.   Musculoskeletal:      Cervical back: Normal range of motion.   Skin:     General: Skin is warm and dry.   Neurological:      Mental Status: She is alert. Mental status is at baseline.   Psychiatric:         Attention and Perception: Attention normal.         Mood and Affect: Mood normal.         Speech: Speech normal.     INSTRUCTIONS FOR PATIENT:     Scheduled Follow-up, Appts Reviewed with Modifications if Needed: Yes  Future Appointments   Date Time Provider Department Center   5/15/2024  9:00 AM Raul Adkins MD [facility]   6/17/2024  1:30 PM Doroteo Beaver MD [facility]   6/17/2024  3:00 PM Nataliia Chen MD [facility]   8/1/2024  8:00 AM LAB, [facility]   8/8/2024  8:20 AM Raul Adkins MD [facility]       Signature: Bam Johnson NP    Transition of Care Visit:  I have reviewed and updated the history and problem list.  I have reconciled the medication list.  I have discussed the hospitalization and current medical issues, prognosis  and plans with the patient/family.

## 2024-05-09 NOTE — ASSESSMENT & PLAN NOTE
--denies nausea, abdominal pain, vomiting since hospital discharge  --refusing to follow up with GI; education done  --compliant with current medications  --reports seeing PCP next week for possible labs  --endorses normal bowel movements and adequate appetite

## 2024-05-10 VITALS
BODY MASS INDEX: 24.99 KG/M2 | SYSTOLIC BLOOD PRESSURE: 122 MMHG | HEART RATE: 57 BPM | RESPIRATION RATE: 18 BRPM | OXYGEN SATURATION: 97 % | WEIGHT: 135.81 LBS | DIASTOLIC BLOOD PRESSURE: 59 MMHG | TEMPERATURE: 98 F | HEIGHT: 62 IN

## 2024-05-10 LAB
ALBUMIN SERPL BCP-MCNC: 3.4 G/DL (ref 3.5–5.2)
ALP SERPL-CCNC: 515 U/L (ref 55–135)
ALT SERPL W/O P-5'-P-CCNC: 320 U/L (ref 10–44)
ANION GAP SERPL CALC-SCNC: 9 MMOL/L (ref 8–16)
AST SERPL-CCNC: 232 U/L (ref 10–40)
BASOPHILS # BLD AUTO: 0.04 K/UL (ref 0–0.2)
BASOPHILS NFR BLD: 0.5 % (ref 0–1.9)
BILIRUB SERPL-MCNC: 2.2 MG/DL (ref 0.1–1)
BUN SERPL-MCNC: 19 MG/DL (ref 8–23)
CALCIUM SERPL-MCNC: 9.5 MG/DL (ref 8.7–10.5)
CHLORIDE SERPL-SCNC: 104 MMOL/L (ref 95–110)
CO2 SERPL-SCNC: 26 MMOL/L (ref 23–29)
CREAT SERPL-MCNC: 1 MG/DL (ref 0.5–1.4)
DIFFERENTIAL METHOD BLD: ABNORMAL
EOSINOPHIL # BLD AUTO: 0.2 K/UL (ref 0–0.5)
EOSINOPHIL NFR BLD: 2.7 % (ref 0–8)
ERYTHROCYTE [DISTWIDTH] IN BLOOD BY AUTOMATED COUNT: 15.5 % (ref 11.5–14.5)
EST. GFR  (NO RACE VARIABLE): 60 ML/MIN/1.73 M^2
GLUCOSE SERPL-MCNC: 85 MG/DL (ref 70–110)
HCT VFR BLD AUTO: 30.5 % (ref 37–48.5)
HGB BLD-MCNC: 9.6 G/DL (ref 12–16)
IMM GRANULOCYTES # BLD AUTO: 0.01 K/UL (ref 0–0.04)
IMM GRANULOCYTES NFR BLD AUTO: 0.1 % (ref 0–0.5)
LIPASE SERPL-CCNC: 69 U/L (ref 4–60)
LYMPHOCYTES # BLD AUTO: 1.5 K/UL (ref 1–4.8)
LYMPHOCYTES NFR BLD: 20.4 % (ref 18–48)
MAGNESIUM SERPL-MCNC: 2.2 MG/DL (ref 1.6–2.6)
MCH RBC QN AUTO: 21 PG (ref 27–31)
MCHC RBC AUTO-ENTMCNC: 31.5 G/DL (ref 32–36)
MCV RBC AUTO: 67 FL (ref 82–98)
MONOCYTES # BLD AUTO: 0.5 K/UL (ref 0.3–1)
MONOCYTES NFR BLD: 7.1 % (ref 4–15)
NEUTROPHILS # BLD AUTO: 5.2 K/UL (ref 1.8–7.7)
NEUTROPHILS NFR BLD: 69.2 % (ref 38–73)
NRBC BLD-RTO: 0 /100 WBC
PHOSPHATE SERPL-MCNC: 3.9 MG/DL (ref 2.7–4.5)
PLATELET # BLD AUTO: 210 K/UL (ref 150–450)
PMV BLD AUTO: 9.9 FL (ref 9.2–12.9)
POTASSIUM SERPL-SCNC: 2.9 MMOL/L (ref 3.5–5.1)
PROT SERPL-MCNC: 6.8 G/DL (ref 6–8.4)
RBC # BLD AUTO: 4.57 M/UL (ref 4–5.4)
SODIUM SERPL-SCNC: 139 MMOL/L (ref 136–145)
WBC # BLD AUTO: 7.51 K/UL (ref 3.9–12.7)

## 2024-05-10 PROCEDURE — 25000003 PHARM REV CODE 250: Performed by: HOSPITALIST

## 2024-05-10 PROCEDURE — 80053 COMPREHEN METABOLIC PANEL: CPT | Performed by: HOSPITALIST

## 2024-05-10 PROCEDURE — 85025 COMPLETE CBC W/AUTO DIFF WBC: CPT | Performed by: HOSPITALIST

## 2024-05-10 PROCEDURE — 83690 ASSAY OF LIPASE: CPT | Performed by: HOSPITALIST

## 2024-05-10 PROCEDURE — 25000003 PHARM REV CODE 250: Performed by: NURSE PRACTITIONER

## 2024-05-10 PROCEDURE — G0378 HOSPITAL OBSERVATION PER HR: HCPCS

## 2024-05-10 PROCEDURE — 83735 ASSAY OF MAGNESIUM: CPT | Performed by: HOSPITALIST

## 2024-05-10 PROCEDURE — 36415 COLL VENOUS BLD VENIPUNCTURE: CPT | Performed by: HOSPITALIST

## 2024-05-10 PROCEDURE — 84100 ASSAY OF PHOSPHORUS: CPT | Performed by: HOSPITALIST

## 2024-05-10 RX ORDER — POTASSIUM CHLORIDE 20 MEQ/1
40 TABLET, EXTENDED RELEASE ORAL
Status: COMPLETED | OUTPATIENT
Start: 2024-05-10 | End: 2024-05-10

## 2024-05-10 RX ORDER — ALUMINUM HYDROXIDE, MAGNESIUM HYDROXIDE, AND SIMETHICONE 1200; 120; 1200 MG/30ML; MG/30ML; MG/30ML
30 SUSPENSION ORAL
Status: DISCONTINUED | OUTPATIENT
Start: 2024-05-10 | End: 2024-05-10 | Stop reason: HOSPADM

## 2024-05-10 RX ADMIN — POTASSIUM CHLORIDE 40 MEQ: 1500 TABLET, EXTENDED RELEASE ORAL at 12:05

## 2024-05-10 RX ADMIN — ATENOLOL 50 MG: 50 TABLET ORAL at 08:05

## 2024-05-10 RX ADMIN — POTASSIUM CHLORIDE 40 MEQ: 1500 TABLET, EXTENDED RELEASE ORAL at 08:05

## 2024-05-10 NOTE — ASSESSMENT & PLAN NOTE
Chronic, controlled. Latest blood pressure and vitals reviewed-     Temp:  [98.2 °F (36.8 °C)-100.3 °F (37.9 °C)]   Pulse:  [67-76]   Resp:  [16-20]   BP: (110-136)/(54-76)   SpO2:  [94 %-98 %] .   Home meds for hypertension were reviewed and noted below.   Hypertension Medications               atenoloL-chlorthalidone (TENORETIC) 50-25 mg Tab Take 1 tablet by mouth every morning.            While in the hospital, will manage blood pressure as follows; Adjust home antihypertensive regimen as follows- hold thiazide    Will utilize p.r.n. blood pressure medication only if patient's blood pressure greater than 180/110 and she develops symptoms such as worsening chest pain or shortness of breath.

## 2024-05-10 NOTE — ASSESSMENT & PLAN NOTE
Patient has hypokalemia which is Acute and currently uncontrolled. Most recent potassium levels reviewed-   Lab Results   Component Value Date    K 2.7 (LL) 05/09/2024   . Will continue potassium replacement per protocol and recheck repeat levels after replacement completed.

## 2024-05-10 NOTE — HPI
73 y.o. female with HTN, dyslipidemia, GERD, hyperbilirubinemia, CKD stage III, and transaminitis, s/p cholecystectomy 1998 presents with a complaint of abdominal pain.  Pain is acute onset, located epigastric, onset shortly after eating breakfast this morning, rated as severe.  Recently hospitalized for elevated liver enzymes.  Denies cough, SOB, chest pain, syncope.  In the ED, AST, ALT T bili, ALP, and lipase all elevated.  Labs also show hypomagnesemia and hypokalemia.  Electrolytes replaced, IV fluids initiated.  CT abdomen pelvis with contrast unremarkable for acute abnormality.  Gastroenterology consulted.  Placed in observation.   Returned missed call N/A LMTCB

## 2024-05-10 NOTE — SUBJECTIVE & OBJECTIVE
Past Medical History:   Diagnosis Date    Cataract     Glaucoma (increased eye pressure)     H/O bilateral oophorectomy     Hx of cholecystectomy     Hypertension     Pure hypercholesterolemia     Thalassemia        Past Surgical History:   Procedure Laterality Date    BILATERAL OOPHORECTOMY      for cysts    CATARACT EXTRACTION Right     about 1998    CHOLECYSTECTOMY  1998    [place] REMOVAL OF OVARY/TUBE(S)      TRABECULECTOMY Right 6/20/2019    Procedure: G6/MP3 LASER;  Surgeon: Doroteo Beaver MD;  Location: [facility] OR [address] [place] [address];  Service: Ophthalmology;  Laterality: Right;       Review of patient's allergies indicates:  No Known Allergies    No current facility-administered medications on file prior to encounter.     Current Outpatient Medications on File Prior to Encounter   Medication Sig    atorvastatin (LIPITOR) 10 MG tablet Take 1 tablet (10 mg total) by mouth once daily.    brimonidine 0.1% (ALPHAGAN P) 0.1 % Drop INSTILL ONE DROP IN BOTH EYES THREE TIMES DAILY    dorzolamide-timolol 2-0.5% (COSOPT) 22.3-6.8 mg/mL ophthalmic solution INSTILL 1 DROP IN BOTH EYES THREE TIMES DAILY    fluorometholone 0.1% (FML) 0.1 % DrpS SHAKE LIQUID AND INSTILL 1 DROP IN BOTH EYES EVERY DAY    latanoprost 0.005 % ophthalmic solution Place 1 drop into both eyes every evening.    potassium chloride (KLOR-CON) 10 MEQ TbSR Take 1 tablet (10 mEq total) by mouth once daily.    atenoloL-chlorthalidone (TENORETIC) 50-25 mg Tab Take 1 tablet by mouth every morning.    LUTEIN ORAL Take 1 tablet by mouth every morning.    UNABLE TO FIND Take 2 Pieces by mouth once daily. medication name: Willi Brain Gummie     Family History       Problem Relation (Age of Onset)    Arthritis Father    Asthma Father    Breast cancer Maternal Grandmother    Hypertension Sister    No Known Problems Brother, Brother, Brother    Ovarian cancer Mother          Tobacco Use    Smoking status: Former     Current packs/day: 0.00     Average packs/day: 0.3 packs/day  for 11.0 years (3.3 ttl pk-yrs)     Types: Cigarettes     Start date: 1996     Quit date: 2007     Years since quittin.7    Smokeless tobacco: Never   Substance and Sexual Activity    Alcohol use: Not Currently     Comment: social drinker    Drug use: No    Sexual activity: Yes     Partners: Male     Birth control/protection: Post-menopausal     Review of Systems   Constitutional:  Negative for chills, fatigue and fever.   HENT:  Negative for congestion and rhinorrhea.    Eyes:  Negative for photophobia and visual disturbance.   Respiratory:  Negative for cough and shortness of breath.    Cardiovascular:  Negative for chest pain, palpitations and leg swelling.   Gastrointestinal:  Positive for abdominal pain. Negative for diarrhea, nausea and vomiting.   Genitourinary:  Negative for dysuria, frequency and urgency.   Skin:  Negative for pallor, rash and wound.   Neurological:  Negative for light-headedness and headaches.   Psychiatric/Behavioral:  Negative for confusion and decreased concentration.      Objective:     Vital Signs (Most Recent):  Temp: 98.2 °F (36.8 °C) (24)  Pulse: 67 (24)  Resp: 16 (24)  BP: 110/61 (24)  SpO2: 95 % (24) Vital Signs (24h Range):  Temp:  [98.2 °F (36.8 °C)-100.3 °F (37.9 °C)] 98.2 °F (36.8 °C)  Pulse:  [67-76] 67  Resp:  [16-20] 16  SpO2:  [94 %-98 %] 95 %  BP: (110-136)/(54-76) 110/61     Weight: 61.6 kg (135 lb 12.9 oz)  Body mass index is 24.84 kg/m².     Physical Exam  Vitals and nursing note reviewed.   Constitutional:       General: She is not in acute distress.     Appearance: She is well-developed.   HENT:      Head: Normocephalic and atraumatic.      Right Ear: External ear normal.      Left Ear: External ear normal.      Nose: Nose normal.   Eyes:      Conjunctiva/sclera: Conjunctivae normal.   Cardiovascular:      Rate and Rhythm: Normal rate and regular rhythm.   Pulmonary:      Effort: Pulmonary effort is  normal. No respiratory distress.   Abdominal:      General: There is no distension.      Palpations: Abdomen is soft.   Musculoskeletal:         General: Normal range of motion.   Skin:     General: Skin is warm and dry.   Neurological:      Mental Status: She is alert and oriented to person, place, and time.   Psychiatric:         Thought Content: Thought content normal.                Significant Labs: All pertinent labs within the past 24 hours have been reviewed.    Significant Imaging: I have reviewed all pertinent imaging results/findings within the past 24 hours.

## 2024-05-10 NOTE — H&P
[facility] Medicine  History & Physical    Patient Name: Yahaira Hinson  MRN: [id number]  Patient Class: OP- Observation  Admission Date: 5/9/2024  Attending Physician: Jeremy Cortez MD   Primary Care Provider: Raul Adkins MD         Patient information was obtained from patient, past medical records, and ER records.     Subjective:     Principal Problem:Hypokalemia    Chief Complaint:   Chief Complaint   Patient presents with    Abdominal Pain     Pt reports abdominal pain and distention. Symptoms started shortly after eating breakfast around 8am. Pt rated her abd pain a 10/10 pain. Pt recently seen here on 4/30 and dx with elevated liver enzymes. +pale skin coloring. Hx of glaucoma.        HPI: 73 y.o. female with HTN, dyslipidemia, GERD, hyperbilirubinemia, CKD stage III, and transaminitis, s/p cholecystectomy 1998 presents with a complaint of abdominal pain.  Pain is acute onset, located epigastric, onset shortly after eating breakfast this morning, rated as severe.  Recently hospitalized for elevated liver enzymes.  Denies cough, SOB, chest pain, syncope.  In the ED, AST, ALT T bili, ALP, and lipase all elevated.  Labs also show hypomagnesemia and hypokalemia.  Electrolytes replaced, IV fluids initiated.  CT abdomen pelvis with contrast unremarkable for acute abnormality.  Gastroenterology consulted.  Placed in observation.    Past Medical History:   Diagnosis Date    Cataract     Glaucoma (increased eye pressure)     H/O bilateral oophorectomy     Hx of cholecystectomy     Hypertension     Pure hypercholesterolemia     Thalassemia        Past Surgical History:   Procedure Laterality Date    BILATERAL OOPHORECTOMY      for cysts    CATARACT EXTRACTION Right     about 1998    CHOLECYSTECTOMY  1998    ND REMOVAL OF OVARY/TUBE(S)      TRABECULECTOMY Right 6/20/2019    Procedure: G6/MP3 LASER;  Surgeon: Doroteo Beaver MD;  Location: [facility] OR [address] [place] [address];  Service: Ophthalmology;   Laterality: Right;       Review of patient's allergies indicates:  No Known Allergies    No current facility-administered medications on file prior to encounter.     Current Outpatient Medications on File Prior to Encounter   Medication Sig    atorvastatin (LIPITOR) 10 MG tablet Take 1 tablet (10 mg total) by mouth once daily.    brimonidine 0.1% (ALPHAGAN P) 0.1 % Drop INSTILL ONE DROP IN BOTH EYES THREE TIMES DAILY    dorzolamide-timolol 2-0.5% (COSOPT) 22.3-6.8 mg/mL ophthalmic solution INSTILL 1 DROP IN BOTH EYES THREE TIMES DAILY    fluorometholone 0.1% (FML) 0.1 % DrpS SHAKE LIQUID AND INSTILL 1 DROP IN BOTH EYES EVERY DAY    latanoprost 0.005 % ophthalmic solution Place 1 drop into both eyes every evening.    potassium chloride (KLOR-CON) 10 MEQ TbSR Take 1 tablet (10 mEq total) by mouth once daily.    atenoloL-chlorthalidone (TENORETIC) 50-25 mg Tab Take 1 tablet by mouth every morning.    LUTEIN ORAL Take 1 tablet by mouth every morning.    UNABLE TO FIND Take 2 Pieces by mouth once daily. medication name: Willi Brain Gummie     Family History       Problem Relation (Age of Onset)    Arthritis Father    Asthma Father    Breast cancer Maternal Grandmother    Hypertension Sister    No Known Problems Brother, Brother, Brother    Ovarian cancer Mother          Tobacco Use    Smoking status: Former     Current packs/day: 0.00     Average packs/day: 0.3 packs/day for 11.0 years (3.3 ttl pk-yrs)     Types: Cigarettes     Start date: 1996     Quit date: 2007     Years since quittin.7    Smokeless tobacco: Never   Substance and Sexual Activity    Alcohol use: Not Currently     Comment: social drinker    Drug use: No    Sexual activity: Yes     Partners: Male     Birth control/protection: Post-menopausal     Review of Systems   Constitutional:  Negative for chills, fatigue and fever.   HENT:  Negative for congestion and rhinorrhea.    Eyes:  Negative for photophobia and visual disturbance.   Respiratory:   Negative for cough and shortness of breath.    Cardiovascular:  Negative for chest pain, palpitations and leg swelling.   Gastrointestinal:  Positive for abdominal pain. Negative for diarrhea, nausea and vomiting.   Genitourinary:  Negative for dysuria, frequency and urgency.   Skin:  Negative for pallor, rash and wound.   Neurological:  Negative for light-headedness and headaches.   Psychiatric/Behavioral:  Negative for confusion and decreased concentration.      Objective:     Vital Signs (Most Recent):  Temp: 98.2 °F (36.8 °C) (05/09/24 2035)  Pulse: 67 (05/09/24 2035)  Resp: 16 (05/09/24 2035)  BP: 110/61 (05/09/24 2035)  SpO2: 95 % (05/09/24 2035) Vital Signs (24h Range):  Temp:  [98.2 °F (36.8 °C)-100.3 °F (37.9 °C)] 98.2 °F (36.8 °C)  Pulse:  [67-76] 67  Resp:  [16-20] 16  SpO2:  [94 %-98 %] 95 %  BP: (110-136)/(54-76) 110/61     Weight: 61.6 kg (135 lb 12.9 oz)  Body mass index is 24.84 kg/m².     Physical Exam  Vitals and nursing note reviewed.   Constitutional:       General: She is not in acute distress.     Appearance: She is well-developed.   HENT:      Head: Normocephalic and atraumatic.      Right Ear: External ear normal.      Left Ear: External ear normal.      Nose: Nose normal.   Eyes:      Conjunctiva/sclera: Conjunctivae normal.   Cardiovascular:      Rate and Rhythm: Normal rate and regular rhythm.   Pulmonary:      Effort: Pulmonary effort is normal. No respiratory distress.   Abdominal:      General: There is no distension.      Palpations: Abdomen is soft.   Musculoskeletal:         General: Normal range of motion.   Skin:     General: Skin is warm and dry.   Neurological:      Mental Status: She is alert and oriented to person, place, and time.   Psychiatric:         Thought Content: Thought content normal.                Significant Labs: All pertinent labs within the past 24 hours have been reviewed.    Significant Imaging: I have reviewed all pertinent imaging results/findings within the  past 24 hours.  Assessment/Plan:     * Hypokalemia  Patient has hypokalemia which is Acute and currently uncontrolled. Most recent potassium levels reviewed-   Lab Results   Component Value Date    K 2.7 (LL) 05/09/2024   . Will continue potassium replacement per protocol and recheck repeat levels after replacement completed.     Hypomagnesemia  Patient has Abnormal Magnesium: hypomagnesemia. Will continue to monitor electrolytes closely. Will replace the affected electrolytes and repeat labs to be done after interventions completed. The patient's magnesium results have been reviewed and are listed below.  Recent Labs   Lab 05/09/24  1522   MG 1.5*        Transaminitis  Unclear etiology, during recent workup patient declined MRCP, continue IV fluids, GI consult, appreciate recs.  NPO.    Hyperbilirubinemia  Unclear etiology, continue IV fluids, GI consult, appreciate recs.  NPO.    Stage 3a chronic kidney disease  Creatine stable for now. BMP reviewed- noted Estimated Creatinine Clearance: 48.8 mL/min (based on SCr of 0.9 mg/dL). according to latest data. Based on current GFR, CKD stage is stage 3 - GFR 30-59.  Monitor UOP and serial BMP and adjust therapy as needed. Renally dose meds. Avoid nephrotoxic medications and procedures.    Essential hypertension  Chronic, controlled. Latest blood pressure and vitals reviewed-     Temp:  [98.2 °F (36.8 °C)-100.3 °F (37.9 °C)]   Pulse:  [67-76]   Resp:  [16-20]   BP: (110-136)/(54-76)   SpO2:  [94 %-98 %] .   Home meds for hypertension were reviewed and noted below.   Hypertension Medications               atenoloL-chlorthalidone (TENORETIC) 50-25 mg Tab Take 1 tablet by mouth every morning.            While in the hospital, will manage blood pressure as follows; Adjust home antihypertensive regimen as follows- hold thiazide    Will utilize p.r.n. blood pressure medication only if patient's blood pressure greater than 180/110 and she develops symptoms such as worsening  chest pain or shortness of breath.    Dyslipidemia  Hold statin      VTE Risk Mitigation (From admission, onward)           Ordered     Reason for No Pharmacological VTE Prophylaxis  Once        Question:  Reasons:  Answer:  Risk of Bleeding    05/09/24 1902     IP VTE HIGH RISK PATIENT  Once         05/09/24 1902     Place sequential compression device  Until discontinued         05/09/24 1902                         On 05/09/2024, patient should be placed in hospital observation services under my care in collaboration with Jeremy Cortez MD.      AdmissionCare    Guideline: General Observation, Observation    Based on the indications selected for the patient, the bed status of Observation was determined to be MET    The following indications were selected as present at the time of evaluation of the patient:      - Clinical care (eg, testing, monitoring, or treatment) needed beyond the usual emergency department time frame (eg, 3 to 4 hours)   - Clinical care needed is not appropriate for a lower level of care (ie, discharge to outpatient setting not appropriate).   - Patient has clinical condition for which observation care is needed, as indicated by 1 or more of the following:    - Gastroenterologic condition or finding (eg, suspected ileus or obstruction, fecal impaction, constipation, gastroparesis, appendicitis or other abdominal infection, peptic ulcer disease without suspected bleeding, hepatitis)    AdmissionCare documentation entered by: Wayne Jones    [facility] [employer], 27th edition, Copyright © 2023 [facility] [employer], [employer] All Rights Reserved.  [id number]:17:16-05:00    Juan R Castañeda Jr., APRN, AGACNP-BC  Hospitalist - Department of Hospital Medicine  [facility] [address] [place] [address]  Office #: [phone]; Pager #: [phone]

## 2024-05-10 NOTE — ASSESSMENT & PLAN NOTE
Unclear etiology, during recent workup patient declined MRCP, continue IV fluids, GI consult, appreciate recs.  NPO.

## 2024-05-10 NOTE — DISCHARGE SUMMARY
[facility] Medicine  Discharge Summary      Patient Name: Yahaira Hinson  MRN: [id number]  [place]PR[place]: [id number]  Patient Class: OP- Observation  Admission Date: 5/9/2024  Hospital Length of Stay: 0 days  Discharge Date and Time:  05/10/2024 3:00 PM  Attending Physician: Jermey Cortez MD   Discharging Provider: Roya Martin NP  Primary Care Provider: Raul Adkins MD    Primary Care Team: ROYA MARTIN    HPI:   73 y.o. female with HTN, dyslipidemia, GERD, hyperbilirubinemia, CKD stage III, and transaminitis, s/p cholecystectomy 1998 presents with a complaint of abdominal pain.  Pain is acute onset, located epigastric, onset shortly after eating breakfast this morning, rated as severe.  Recently hospitalized for elevated liver enzymes.  Denies cough, SOB, chest pain, syncope.  In the ED, AST, ALT T bili, ALP, and lipase all elevated.  Labs also show hypomagnesemia and hypokalemia.  Electrolytes replaced, IV fluids initiated.  CT abdomen pelvis with contrast unremarkable for acute abnormality.  Gastroenterology consulted.  Placed in observation.    * No surgery found *      Hospital Course:   Admission to the hospital for abdominal pain and elevated LFTs T bili.  Patient recently discharged for similar symptoms and at that time declined MRCP.  Plan for MRCP this admission however patient again politely declined despite offering antianxiety medication.  Review of chart showed recent labs alcohol, PETH HSV ceruloplasmin and acute hepatitis panel negative/ also recent lab DANIEL positive however a in a titer and DANIEL pattern negative.  Ultrasound shows diffuse hepatic steatosis and status post cholecystectomy and no biliary dilatation. Repeat LFTs and T bili stable.  Abdominal pain resolved and patient tolerated regular diet with no issues.  Instruct to stop taking recent new start vitamin gummies.  Magnesium and potassium replaced.  Patient hemodynamically stable for discharge home with close  follow-up with hepatology/pancreatic biliary service at [facility].  Above discussed with patient and  at bedside.     Goals of Care Treatment Preferences:  Code Status: Full Code      Consults:     No new Assessment & Plan notes have been filed under this hospital service since the last note was generated.  Service: Hospital Medicine    Final Active Diagnoses:    Diagnosis Date Noted POA    PRINCIPAL PROBLEM:  Hypokalemia [E87.6] 04/30/2024 Yes    Hypomagnesemia [E83.42] 05/09/2024 Yes    Transaminitis [R74.01] 04/30/2024 Yes    Hyperbilirubinemia [E80.6] 04/30/2024 Yes    Stage 3a chronic kidney disease [N18.31] 12/03/2019 Yes     Chronic    Dyslipidemia [E78.5] 05/29/2019 Yes     Chronic    Essential hypertension [I10] 05/29/2019 Yes     Chronic      Problems Resolved During this Admission:       Discharged Condition: stable    Disposition: Home or Self Care    Follow Up:   Follow-up Information       Raul Adkins MD Follow up.    Specialty: Internal Medicine  Contact information:  [address] [place] [address]  [place] [address] [phone]                           Patient Instructions:      Diet Adult Regular     Diet Cardiac     Notify your health care provider if you experience any of the following:  temperature >100.4     Notify your health care provider if you experience any of the following:  difficulty breathing or increased cough     Notify your health care provider if you experience any of the following:  increased confusion or weakness     Activity as tolerated       Significant Diagnostic Studies: N/A    Pending Diagnostic Studies:       None           Medications:  Reconciled Home Medications:      Medication List        CONTINUE taking these medications      atenoloL-chlorthalidone 50-25 mg Tab  Commonly known as: TENORETIC  Take 1 tablet by mouth every morning.     atorvastatin 10 MG tablet  Commonly known as: LIPITOR  Take 1 tablet (10 mg total) by mouth once daily.     brimonidine 0.1% 0.1 %  Drop  Commonly known as: ALPHAGAN P  INSTILL ONE DROP IN BOTH EYES THREE TIMES DAILY     dorzolamide-timolol 2-0.5% 22.3-6.8 mg/mL ophthalmic solution  Commonly known as: COSOPT  INSTILL 1 DROP IN BOTH EYES THREE TIMES DAILY     fluorometholone 0.1% 0.1 % Drps  Commonly known as: FML  SHAKE LIQUID AND INSTILL 1 DROP IN BOTH EYES EVERY DAY     latanoprost 0.005 % ophthalmic solution  Place 1 drop into both eyes every evening.     potassium chloride 10 MEQ Tbsr  Commonly known as: KLOR-CON  Take 1 tablet (10 mEq total) by mouth once daily.            STOP taking these medications      LUTEIN ORAL     UNABLE TO FIND              Indwelling Lines/Drains at time of discharge:   Lines/Drains/Airways       None                   Time spent on the discharge of patient: 35 minutes       Roya Henderson NP  Department of Hospital Medicine  [facility] - Observation

## 2024-05-10 NOTE — PROGRESS NOTES
[facility]  Nurses Note -- 4 Eyes      5/10/2024       Skin assessed on: Q Shift      [x] No Pressure Injuries Present    []Prevention Measures Documented    [] Yes LDA  for Pressure Injury Previously documented     [] Yes New Pressure Injury Discovered   [] LDA for New Pressure Injury Added      Attending RN:  Javy Chiu RN     Second RN:  Anu Carranza LPN

## 2024-05-10 NOTE — ASSESSMENT & PLAN NOTE
Patient has Abnormal Magnesium: hypomagnesemia. Will continue to monitor electrolytes closely. Will replace the affected electrolytes and repeat labs to be done after interventions completed. The patient's magnesium results have been reviewed and are listed below.  Recent Labs   Lab 05/09/24  1522   MG 1.5*

## 2024-05-10 NOTE — ASSESSMENT & PLAN NOTE
Creatine stable for now. BMP reviewed- noted Estimated Creatinine Clearance: 48.8 mL/min (based on SCr of 0.9 mg/dL). according to latest data. Based on current GFR, CKD stage is stage 3 - GFR 30-59.  Monitor UOP and serial BMP and adjust therapy as needed. Renally dose meds. Avoid nephrotoxic medications and procedures.

## 2024-05-10 NOTE — HOSPITAL COURSE
Admission to the hospital for abdominal pain and elevated LFTs T bili.  Patient recently discharged for similar symptoms and at that time declined MRCP.  Plan for MRCP this admission however patient again politely declined despite offering antianxiety medication.  Review of chart showed recent labs alcohol, PETH HSV ceruloplasmin and acute hepatitis panel negative/ also recent lab DANIEL positive however a in a titer and DANIEL pattern negative.  Ultrasound shows diffuse hepatic steatosis and status post cholecystectomy and no biliary dilatation. Repeat LFTs and T bili stable.  Abdominal pain resolved and patient tolerated regular diet with no issues.  Instruct to stop taking recent new start vitamin gummies.  Magnesium and potassium replaced.  Patient hemodynamically stable for discharge home with close follow-up with hepatology/pancreatic biliary service at [facility].  Above discussed with patient and  at bedside.

## 2024-05-10 NOTE — PLAN OF CARE
05/10/24 0925   Discharge Planning   Assessment Type Discharge Planning Brief Assessment   Resource/Environmental Concerns none   Support Systems Spouse/significant other   Equipment Currently Used at Home none   Current Living Arrangements home   Patient/Family Anticipates Transition to home with family   Patient/Family Anticipated Services at Transition none   DME Needed Upon Discharge  none   Discharge Plan A Home with family  (with instructions to keep all scheduled follow up appointments)       [id number] - [place], [place] [address] [address] AT [facility] [address] [place] [address]  Phone: [phone] Fax: [phone]

## 2024-05-10 NOTE — NURSING
[facility]  Nurses Note -- 4 Eyes      5/9/2024       Skin assessed on: Admit      [x] No Pressure Injuries Present    []Prevention Measures Documented    [] Yes LDA  for Pressure Injury Previously documented     [] Yes New Pressure Injury Discovered   [] LDA for New Pressure Injury Added      Attending RN:  Anu Carranza LPN     Second RN:  Ivana Colbert RN

## 2024-05-10 NOTE — PLAN OF CARE
05/10/24 1136   Final Note   Assessment Type Final Discharge Note   Anticipated Discharge Disposition Home   Hospital Resources/Appts/Education Provided Appointments scheduled and added to AVS   Post-Acute Status   Post-Acute Authorization Other   Other Status No Post-Acute Service Needs     Pts nurse [facility] notified that the pt can d/c from CM standpoint

## 2024-05-13 LAB
BACTERIA BLD CULT: NORMAL
BACTERIA BLD CULT: NORMAL

## 2024-05-14 NOTE — PROGRESS NOTES
This note was created by combination of typed  and M-Modal dictation.  Transcription errors may be present.  If there are any questions, please contact me.    Assessment and Plan:   Assessment and Plan    Transaminitis  Hyperbilirubinemia  -2 hospitalizations for abdominal pain with transaminitis.  CT abdomen pelvis unremarkable, right upper quadrant ultrasound unremarkable.  Status post cholecystectomy.  Was recommended to undergo MRCP and she refused both times.  Rationale behind MRCP discussed.  She does not want to try a sedative.  She is inquiring about open MRI as an option.  Will discuss with GI whether that would be a viable option.    Labs positive for DANIEL but otherwise negative for antismooth muscle antibody, antimitochondrial antibody, hep a, hep B, hep C, ceruloplasmin, CMV, HSV  Repeat CMP.  Check potassium may need temporary increase in her potassium supplement   Check LFTs to trend  Stop atorvastatin until hepatology follow-up  -     Comprehensive Metabolic Panel; Future; Expected date: 05/15/2024  -     CBC Auto Differential; Future; Expected date: 05/15/2024    Stage 3a chronic kidney disease  Essential hypertension  -check potassium, magnesium  -     Magnesium; Future; Expected date: 05/15/2024             There are no discontinued medications.    meds sent this encounter:         Follow Up:   Future Appointments   Date Time Provider Department Center   6/17/2024  1:30 PM Doroteo Beaver MD [facility] [place]   6/17/2024  3:00 PM [facility]   8/1/2024  8:00 AM [facility]   8/8/2024  8:20 AM Raul Adkins MD [facility] [place]         Subjective:   Subjective   Chief Complaint   Patient presents with    Follow-up     Hospital f/u       PETER Syed is a 73 y.o. female.    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 Patient is postmenopausal.    Last appointment with this clinic was 2024. Last visit with me 2024   To summarize last visit and events leading up to today:  Hypertension, CKD stage IIIA, hypokalemia, on K supplement   Hyperlipidemia/statin  Osteopenia.  Needs to update DEXA scan but she was wary at that time b/c of concerns about polypharmacy if dx'd with osteoporosis.  If bisphosphonate indicated, her creatinine can support it. Ca and vit D supplement.   Post menopause with dryness, not taking topical estrogen.  Using OTC black cohosh, advised to stop    Last visit with me 2024 for physical exam.  Ordered DEXA.  Start calcium and vitamin-D  Dyslipidemia not taking statin regularly, recommended to restart, ordered low-dose atorvastatin 10    2024 DEXA L-spine-0.5, total hip-0.2, femoral neck-0.6. FRAX score 0.8/8.6%. Wide variation in BMD between L3 and L4.  Check plain film x-ray. If no compression fracture, treat as normal BMD. If compression fracture will need to start treatment with bisphosphonate     Grade 1 anterolisthesis at L4-5. No compression fracture. No evidence of pars defect. No focal osseous lesion. Multilevel degenerative disc disease and facet arthropathy, lower lumbar predominant  Wife variation on DEXA scan between vertebra  Plan was if compression fx then start bisphosphonate. If not, no start  Results to pt via MyChart. No start     Hospitalization  through 2024 initial presentation of right upper quadrant and epigastric abdominal  pain.  Found to have transaminitis.  Patient declined MRCP.  Hospital Course:   73-year-old female with past medical history of hypertension and hyperlipidemia admitted on 04/30/2024 for further evaluation of acute transaminitis.  Acute hepatitis panel nonreactive.  Recommended MRCP, but patient politely declined.  She reports she is claustrophobic, anti-anxiety medication offered. She refused anti-anxiety meds and refused MRCP again.  Patient's symptoms of abdominal pain has resolved prior to admission.  LFTs and bilirubin trended down.  Patient without fevers or leukocytosis.  GI consulted and discussed case with Dr Shields with [facility]. GI Rec f/u with pancreatic biliary clinic outpatient- request sent. Referral sent to hepatology also.     Subsequent hospitalization 5/9 through 05/10/2024 for recurrence of abdominal pain  Hospital Course:   Admission to the hospital for abdominal pain and elevated LFTs T bili.  Patient recently discharged for similar symptoms and at that time declined MRCP.  Plan for MRCP this admission however patient again politely declined despite offering antianxiety medication.  Review of chart showed recent labs alcohol, PETH HSV ceruloplasmin and acute hepatitis panel negative/ also recent lab DANIEL positive however a in a titer and DANIEL pattern negative.  Ultrasound shows diffuse hepatic steatosis and status post cholecystectomy and no biliary dilatation. Repeat LFTs and T bili stable.  Abdominal pain resolved and patient tolerated regular diet with no issues.  Instruct to stop taking recent new start vitamin gummies.  Magnesium and potassium replaced.  Patient hemodynamically stable for discharge home with close follow-up with hepatology/pancreatic biliary service at [facility].  Above discussed with patient and  at bedside.     5/9/24 CT abd/pelvis  Mild fatty infiltration of the liver.  Mild pneumobilia.  Cholecystectomy.   Colonic  diverticulosis.    5/10/24 abd US  1. Diffuse hepatic steatosis.  2. Status post cholecystectomy.    Appointment with hepatology 6/17/24    Due to statin?    Today's visit:    2 episodes of abd pain  Lasted < 1 day each time  After a meal?   Spontaneous resolution  Sharp pain  Had a fever low grade during last hospitalization.     Had started OTC Nervive, was instructed to stop that    Had not been taking atorvastatin regularly, after last visit started taking daily. In previous years had taken on a daily basis without issues  Instructed to hold until hepatology evaluation    K was low during hospitalization.  On chronic K Dur.  Possibly from decreased oral intake?    Stools are normal    Diet has not changed    Remains reluctant to pursue MRCP. And is reluctant to take sedatives for MRI.  Inquires about open MRI as a possibility.  Discussed rationale for the MRCP       Patient Care Team:  Raul Adkins MD as PCP - General (Internal Medicine)  Temi Ziegler MD as Consulting Physician (Internal Medicine)  Doroteo Beaver MD as Consulting Physician (Ophthalmology)  Jeremias Ca MD as Consulting Physician (Gastroenterology)  Georgie Ralph MD as Obstetrician (Obstetrics and Gynecology)  Veronica Baker MA as Care Coordinator  [facility] (Gastroenterology)      Patient Active Problem List    Diagnosis Date Noted    Hypomagnesemia 05/09/2024    Transaminitis 04/30/2024    Hyperbilirubinemia 04/30/2024    Hypokalemia 04/30/2024    Overweight (BMI 25.0-29.9) 04/30/2024    Blindness and low vision 02/19/2024    Squamous blepharitis of upper and lower eyelids of both eyes 12/18/2023    Osteopenia of multiple sites on DEXa 2015; 02/08/2024 DEXA L-spine-0.5, total hip-0.2, femoral neck-0.6.  FRAX score 0.8/8.6%.  Wide variation in BMD between L3 and L4. Subsequent L spine XR normal 07/28/2022 02/08/2024 DEXA L-spine-0.5, total hip-0.2, femoral  neck-0.6.  FRAX score 0.8/8.6%.  Wide variation in BMD between L3 and L4. Subsequent L spine XR normal; hold on bisphosphonate      GERD with esophagitis on EGD 8/2015 01/16/2020 08/17/2015 EGD irregular Z-line biopsies of duodenum and stomach and GE junction obtained.  Biopsy showed mild chronic esophagitis.  Mild chronic gastritis.  H pylori negative.  Duodenum normal.      Screening for colon cancer 08/17/2015 colonoscopy diverticulosis entire colon.  Grade 1 internal hemorrhoids. 01/16/2020 08/17/2015 colonoscopy diverticulosis entire colon.  Grade 1 internal hemorrhoids.      Stage 3a chronic kidney disease 12/03/2019    Dyslipidemia 05/29/2019    Essential hypertension 05/29/2019    Status post cholecystectomy 1998 still gets bile sludge in the bile duct; Dr. 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  Medication List with Changes/Refills   Current Medications    ATENOLOL-CHLORTHALIDONE (TENORETIC) 50-25 MG TAB    Take 1 tablet by mouth every morning.    ATORVASTATIN (LIPITOR) 10 MG TABLET    Take 1 tablet (10 mg total) by mouth once daily.    BRIMONIDINE 0.1% (ALPHAGAN P) 0.1 % DROP    INSTILL ONE DROP IN BOTH EYES THREE TIMES DAILY    DORZOLAMIDE-TIMOLOL 2-0.5% (COSOPT) 22.3-6.8 MG/ML OPHTHALMIC SOLUTION    INSTILL 1 DROP IN BOTH EYES THREE TIMES DAILY    FLUOROMETHOLONE 0.1% (FML) 0.1 % DRPS    SHAKE LIQUID AND INSTILL 1 DROP IN BOTH EYES EVERY DAY    LATANOPROST  "0.005 % OPHTHALMIC SOLUTION    Place 1 drop into both eyes every evening.    POTASSIUM CHLORIDE (KLOR-CON) 10 MEQ TBSR    Take 1 tablet (10 mEq total) by mouth once daily.         Objective:   Objective   Physical Exam   Vitals:    05/15/24 0854   BP: 132/66   BP Location: Left arm   Patient Position: Sitting   BP Method: Medium (Automatic)   Pulse: 60   Temp: 98.6 °F (37 °C)   TempSrc: Oral   SpO2: 97%   Weight: 63.2 kg (139 lb 5.3 oz)   Height: 5' 2" (1.575 m)    Body mass index is 25.48 kg/m².  Weight: 63.2 kg (139 lb 5.3 oz)   Height: 5' 2" (157.5 cm)     Physical Exam  Constitutional:       General: She is not in acute distress.     Appearance: She is well-developed.   Eyes:      Extraocular Movements: Extraocular movements intact.   Cardiovascular:      Rate and Rhythm: Normal rate and regular rhythm.      Heart sounds: Normal heart sounds. No murmur heard.  Pulmonary:      Effort: Pulmonary effort is normal.      Breath sounds: Normal breath sounds.   Musculoskeletal:         General: Normal range of motion.      Right lower leg: No edema.      Left lower leg: No edema.   Skin:     General: Skin is warm and dry.   Neurological:      Mental Status: She is alert and oriented to person, place, and time.      Coordination: Coordination normal.   Psychiatric:         Behavior: Behavior normal.         Thought Content: Thought content normal.         Component      Latest Ref Rng 4/30/2024 5/9/2024 5/10/2024   WBC      3.90 - 12.70 K/uL   7.51    RBC      4.00 - 5.40 M/uL   4.57    Hemoglobin      12.0 - 16.0 g/dL   9.6 (L)    Hematocrit      37.0 - 48.5 %   30.5 (L)    MCV      82 - 98 fL   67 (L)    MCH      27.0 - 31.0 pg   21.0 (L)    MCHC      32.0 - 36.0 g/dL   31.5 (L)    RDW      11.5 - 14.5 %   15.5 (H)    Platelet Count      150 - 450 K/uL   210    MPV      9.2 - 12.9 fL   9.9    Immature Granulocytes      0.0 - 0.5 %   0.1    Gran # (ANC)      1.8 - 7.7 K/uL   5.2    Immature Grans (Abs)      0.00 - 0.04 " K/uL   0.01    Lymph #      1.0 - 4.8 K/uL   1.5    Mono #      0.3 - 1.0 K/uL   0.5    Eos #      0.0 - 0.5 K/uL   0.2    Baso #      0.00 - 0.20 K/uL   0.04    nRBC      0 /100 WBC   0    Gran %      38.0 - 73.0 %   69.2    Lymph %      18.0 - 48.0 %   20.4    Mono %      4.0 - 15.0 %   7.1    Eos %      0.0 - 8.0 %   2.7    Basophil %      0.0 - 1.9 %   0.5    Differential Method   Automated    Sodium      136 - 145 mmol/L 140  138  139    Potassium      3.5 - 5.1 mmol/L 4.1  2.7 (LL)  2.9 (L)    Chloride      95 - 110 mmol/L 101  103  104    CO2      23 - 29 mmol/L 28  24  26    Glucose      70 - 110 mg/dL 106  107  85    BUN      8 - 23 mg/dL 18  21  19    Creatinine      0.5 - 1.4 mg/dL 1.0  0.9  1.0    Calcium      8.7 - 10.5 mg/dL 10.5  8.8  9.5    PROTEIN TOTAL      6.0 - 8.4 g/dL 7.4  6.6  6.8    PROTEIN TOTAL       7.4      Albumin      3.5 - 5.2 g/dL 3.7  3.4 (L)  3.4 (L)    Albumin       3.7      BILIRUBIN TOTAL      0.1 - 1.0 mg/dL 2.8 (H)  2.5 (H)  2.2 (H)    BILIRUBIN TOTAL       2.8 (H)      ALP      55 - 135 U/L 421 (H)  516 (H)  515 (H)    ALP       421 (H)      AST      10 - 40 U/L 234 (H)  335 (H)  232 (H)    AST       234 (H)      ALT      10 - 44 U/L 280 (H)  274 (H)  320 (H)    ALT       280 (H)      eGFR      >60 mL/min/1.73 m^2 60  >60  60    Anion Gap      8 - 16 mmol/L 11  11  9    Anti Sm Antibody      0.00 - 0.99 Ratio 0.09      Anti-Sm Interpretation      Negative  Negative      Anti-SSA Antibody      0.00 - 0.99 Ratio 0.08      Anti-SSA Interpretation      Negative  Negative      Anti-SSB Antibody      0.00 - 0.99 Ratio 0.07      Anti-SSB Interpretation      Negative  Negative      ds DNA Ab      Negative 1:10  Negative 1:10      Anti Sm/RNP Antibody      0.00 - 0.99 Ratio 0.08      Anti-Sm/RNP Interpretation      Negative  Negative      Bilirubin Direct      0.1 - 0.3 mg/dL 2.0 (H)      PEth 16:0/18.1 (POPEth)      Cutoff: 10 ng/mL <10      PEth 16:0/18.2 (PLPEth)      Cutoff: 10  ng/mL SEE BELOW      PETH INTERPRETATION SEE BELOW      A1AT Proteotype S/Z, LC-MS/MS SEE BELOW      Alpha-1 Anti-Trypsin      100 - 190 mg/dL 178      Cytomegalovirus PCR, Quant      <50 IU/mL Not Detected      Cytomegalovirus DNA      Not Detected  CMV DNA not detected      HSV-1 DNA by PCR      Negative  Negative      HSV-2 DNA by PCR      Negative  Negative      PT      9.0 - 12.5 sec  11.1     INR      0.8 - 1.2   1.0     DANIEL Screen      Negative <1:80  Positive !      Anti-Mitochon Ab IFA      Negative  Negative 1:40      Smooth Muscle Ab      Negative  Negative 1:40      CERULOPLASMIN      15.0 - 45.0 mg/dL 33.0      Ferritin      20.0 - 300.0 ng/mL 703 (H)      Hepatitis C Ab      Non-reactive  Non-reactive      Hepatitis B Surface Ag      Non-reactive  Non-reactive      Hep A IgM      Non-reactive  Non-reactive      DANIEL PATTERN 1 Homogeneous      DANIEL Titer 1 1:160      Lipase      4 - 60 U/L  124 (H)  69 (H)    PTT      21.0 - 32.0 sec  22.1        Legend:  (H) High  ! Abnormal  (LL) Low Panic  (L) Low

## 2024-05-15 ENCOUNTER — OFFICE VISIT (OUTPATIENT)
Dept: FAMILY MEDICINE | Facility: CLINIC | Age: 74
End: 2024-05-15
Payer: MEDICARE

## 2024-05-15 ENCOUNTER — LAB VISIT (OUTPATIENT)
Dept: LAB | Facility: HOSPITAL | Age: 74
End: 2024-05-15
Attending: INTERNAL MEDICINE
Payer: MEDICARE

## 2024-05-15 VITALS
OXYGEN SATURATION: 97 % | DIASTOLIC BLOOD PRESSURE: 66 MMHG | BODY MASS INDEX: 25.64 KG/M2 | SYSTOLIC BLOOD PRESSURE: 132 MMHG | HEIGHT: 62 IN | HEART RATE: 60 BPM | TEMPERATURE: 99 F | WEIGHT: 139.31 LBS

## 2024-05-15 DIAGNOSIS — R74.01 TRANSAMINITIS: Primary | ICD-10-CM

## 2024-05-15 DIAGNOSIS — E80.6 HYPERBILIRUBINEMIA: ICD-10-CM

## 2024-05-15 DIAGNOSIS — I10 ESSENTIAL HYPERTENSION: Chronic | ICD-10-CM

## 2024-05-15 DIAGNOSIS — R74.01 TRANSAMINITIS: ICD-10-CM

## 2024-05-15 DIAGNOSIS — N18.31 STAGE 3A CHRONIC KIDNEY DISEASE: Chronic | ICD-10-CM

## 2024-05-15 LAB
ALBUMIN SERPL BCP-MCNC: 4 G/DL (ref 3.5–5.2)
ALP SERPL-CCNC: 357 U/L (ref 55–135)
ALT SERPL W/O P-5'-P-CCNC: 88 U/L (ref 10–44)
ANION GAP SERPL CALC-SCNC: 11 MMOL/L (ref 8–16)
AST SERPL-CCNC: 39 U/L (ref 10–40)
BASOPHILS # BLD AUTO: 0.07 K/UL (ref 0–0.2)
BASOPHILS NFR BLD: 1 % (ref 0–1.9)
BILIRUB SERPL-MCNC: 0.9 MG/DL (ref 0.1–1)
BUN SERPL-MCNC: 22 MG/DL (ref 8–23)
CALCIUM SERPL-MCNC: 10.3 MG/DL (ref 8.7–10.5)
CHLORIDE SERPL-SCNC: 99 MMOL/L (ref 95–110)
CO2 SERPL-SCNC: 27 MMOL/L (ref 23–29)
CREAT SERPL-MCNC: 1 MG/DL (ref 0.5–1.4)
DIFFERENTIAL METHOD BLD: ABNORMAL
EOSINOPHIL # BLD AUTO: 0.1 K/UL (ref 0–0.5)
EOSINOPHIL NFR BLD: 1.5 % (ref 0–8)
ERYTHROCYTE [DISTWIDTH] IN BLOOD BY AUTOMATED COUNT: 15.9 % (ref 11.5–14.5)
EST. GFR  (NO RACE VARIABLE): 59.9 ML/MIN/1.73 M^2
GLUCOSE SERPL-MCNC: 91 MG/DL (ref 70–110)
HCT VFR BLD AUTO: 35 % (ref 37–48.5)
HGB BLD-MCNC: 10.9 G/DL (ref 12–16)
IMM GRANULOCYTES # BLD AUTO: 0.07 K/UL (ref 0–0.04)
IMM GRANULOCYTES NFR BLD AUTO: 1 % (ref 0–0.5)
LYMPHOCYTES # BLD AUTO: 2.2 K/UL (ref 1–4.8)
LYMPHOCYTES NFR BLD: 30.8 % (ref 18–48)
MAGNESIUM SERPL-MCNC: 1.8 MG/DL (ref 1.6–2.6)
MCH RBC QN AUTO: 21.4 PG (ref 27–31)
MCHC RBC AUTO-ENTMCNC: 31.1 G/DL (ref 32–36)
MCV RBC AUTO: 69 FL (ref 82–98)
MONOCYTES # BLD AUTO: 0.6 K/UL (ref 0.3–1)
MONOCYTES NFR BLD: 7.8 % (ref 4–15)
NEUTROPHILS # BLD AUTO: 4.2 K/UL (ref 1.8–7.7)
NEUTROPHILS NFR BLD: 57.9 % (ref 38–73)
NRBC BLD-RTO: 0 /100 WBC
PLATELET # BLD AUTO: 273 K/UL (ref 150–450)
PMV BLD AUTO: 10.4 FL (ref 9.2–12.9)
POTASSIUM SERPL-SCNC: 3.7 MMOL/L (ref 3.5–5.1)
PROT SERPL-MCNC: 7.8 G/DL (ref 6–8.4)
RBC # BLD AUTO: 5.09 M/UL (ref 4–5.4)
SODIUM SERPL-SCNC: 137 MMOL/L (ref 136–145)
WBC # BLD AUTO: 7.27 K/UL (ref 3.9–12.7)

## 2024-05-15 PROCEDURE — 80053 COMPREHEN METABOLIC PANEL: CPT | Performed by: INTERNAL MEDICINE

## 2024-05-15 PROCEDURE — 3008F BODY MASS INDEX DOCD: CPT | Mod: CPTII,S$GLB,, | Performed by: INTERNAL MEDICINE

## 2024-05-15 PROCEDURE — 83735 ASSAY OF MAGNESIUM: CPT | Performed by: INTERNAL MEDICINE

## 2024-05-15 PROCEDURE — 1126F AMNT PAIN NOTED NONE PRSNT: CPT | Mod: CPTII,S$GLB,, | Performed by: INTERNAL MEDICINE

## 2024-05-15 PROCEDURE — 1160F RVW MEDS BY RX/DR IN RCRD: CPT | Mod: CPTII,S$GLB,, | Performed by: INTERNAL MEDICINE

## 2024-05-15 PROCEDURE — 1159F MED LIST DOCD IN RCRD: CPT | Mod: CPTII,S$GLB,, | Performed by: INTERNAL MEDICINE

## 2024-05-15 PROCEDURE — 85025 COMPLETE CBC W/AUTO DIFF WBC: CPT | Performed by: INTERNAL MEDICINE

## 2024-05-15 PROCEDURE — 99214 OFFICE O/P EST MOD 30 MIN: CPT | Mod: S$GLB,,, | Performed by: INTERNAL MEDICINE

## 2024-05-15 PROCEDURE — 36415 COLL VENOUS BLD VENIPUNCTURE: CPT | Mod: PO | Performed by: INTERNAL MEDICINE

## 2024-05-15 PROCEDURE — 99999 PR PBB SHADOW E&M-EST. PATIENT-LVL IV: CPT | Mod: PBBFAC,,, | Performed by: INTERNAL MEDICINE

## 2024-05-15 PROCEDURE — 3075F SYST BP GE 130 - 139MM HG: CPT | Mod: CPTII,S$GLB,, | Performed by: INTERNAL MEDICINE

## 2024-05-15 PROCEDURE — 3078F DIAST BP <80 MM HG: CPT | Mod: CPTII,S$GLB,, | Performed by: INTERNAL MEDICINE

## 2024-05-16 NOTE — PROGRESS NOTES
CMP K WNL, was low during hospitalization. On KDur chronically  LFTs trending down/much better compared to hospitalization  CBC stable. Known thalassemia  Results to pt via MyChart. No changes in K Dur dose. Upcoming hepatology consult

## 2024-05-29 ENCOUNTER — TELEPHONE (OUTPATIENT)
Dept: FAMILY MEDICINE | Facility: CLINIC | Age: 74
End: 2024-05-29
Payer: MEDICARE

## 2024-05-29 NOTE — TELEPHONE ENCOUNTER
Here with her   Has planned trip to [place]   But with acute hepatitis issue she wants to cancel  Needs note for travel insurance  written

## 2024-05-29 NOTE — LETTER
May 29, 2024      [facility]  [address] [facility]  [place] [address]  Phone: [phone]  Fax: [phone]       Patient: Yahaira Hinson   YOB: 1950  Date of Visit: 05/29/2024    To Whom It May Concern:    Yahaira Hinson is under my medical care.  Due to acute health issues I have recommended he avoid international travel until further notice, for the next 6 months at least.     If you have any questions, you may contact my office at [phone].    Sincerely,    Raul Adkins MD

## 2024-06-08 ENCOUNTER — TELEPHONE (OUTPATIENT)
Dept: ENDOSCOPY | Facility: HOSPITAL | Age: 74
End: 2024-06-08
Payer: MEDICARE

## 2024-06-08 NOTE — TELEPHONE ENCOUNTER
Spoke to pt to schedule EUS/ERCp per Dr. Shields recommendation. Pt stated she does not want to move forward with scheduling EUS/ERCP. Order cancelled. Message forward to referring provider.

## 2024-06-17 ENCOUNTER — OFFICE VISIT (OUTPATIENT)
Dept: HEPATOLOGY | Facility: CLINIC | Age: 74
End: 2024-06-17
Payer: MEDICARE

## 2024-06-17 ENCOUNTER — LAB VISIT (OUTPATIENT)
Dept: LAB | Facility: HOSPITAL | Age: 74
End: 2024-06-17
Attending: INTERNAL MEDICINE
Payer: MEDICARE

## 2024-06-17 VITALS
HEART RATE: 69 BPM | BODY MASS INDEX: 26.09 KG/M2 | DIASTOLIC BLOOD PRESSURE: 84 MMHG | SYSTOLIC BLOOD PRESSURE: 150 MMHG | WEIGHT: 141.75 LBS | HEIGHT: 62 IN

## 2024-06-17 DIAGNOSIS — K76.89 OTHER SPECIFIED DISEASES OF LIVER: ICD-10-CM

## 2024-06-17 DIAGNOSIS — R10.9 ABDOMINAL PAIN, UNSPECIFIED ABDOMINAL LOCATION: ICD-10-CM

## 2024-06-17 DIAGNOSIS — K76.0 HEPATIC STEATOSIS: ICD-10-CM

## 2024-06-17 DIAGNOSIS — R79.89 ELEVATED LFTS: ICD-10-CM

## 2024-06-17 DIAGNOSIS — Z90.49 HISTORY OF CHOLECYSTECTOMY: ICD-10-CM

## 2024-06-17 LAB
ALBUMIN SERPL BCP-MCNC: 4.2 G/DL (ref 3.5–5.2)
ALP SERPL-CCNC: 255 U/L (ref 55–135)
ALT SERPL W/O P-5'-P-CCNC: 77 U/L (ref 10–44)
AST SERPL-CCNC: 57 U/L (ref 10–40)
BILIRUB DIRECT SERPL-MCNC: 0.3 MG/DL (ref 0.1–0.3)
BILIRUB SERPL-MCNC: 0.6 MG/DL (ref 0.1–1)
INR PPP: 0.9 (ref 0.8–1.2)
PROT SERPL-MCNC: 8 G/DL (ref 6–8.4)
PROTHROMBIN TIME: 10.2 SEC (ref 9–12.5)

## 2024-06-17 PROCEDURE — 85610 PROTHROMBIN TIME: CPT | Performed by: INTERNAL MEDICINE

## 2024-06-17 PROCEDURE — 99214 OFFICE O/P EST MOD 30 MIN: CPT | Mod: S$GLB,,, | Performed by: INTERNAL MEDICINE

## 2024-06-17 PROCEDURE — 3079F DIAST BP 80-89 MM HG: CPT | Mod: CPTII,S$GLB,, | Performed by: INTERNAL MEDICINE

## 2024-06-17 PROCEDURE — 3077F SYST BP >= 140 MM HG: CPT | Mod: CPTII,S$GLB,, | Performed by: INTERNAL MEDICINE

## 2024-06-17 PROCEDURE — 1160F RVW MEDS BY RX/DR IN RCRD: CPT | Mod: CPTII,S$GLB,, | Performed by: INTERNAL MEDICINE

## 2024-06-17 PROCEDURE — 1101F PT FALLS ASSESS-DOCD LE1/YR: CPT | Mod: CPTII,S$GLB,, | Performed by: INTERNAL MEDICINE

## 2024-06-17 PROCEDURE — 1126F AMNT PAIN NOTED NONE PRSNT: CPT | Mod: CPTII,S$GLB,, | Performed by: INTERNAL MEDICINE

## 2024-06-17 PROCEDURE — 3288F FALL RISK ASSESSMENT DOCD: CPT | Mod: CPTII,S$GLB,, | Performed by: INTERNAL MEDICINE

## 2024-06-17 PROCEDURE — 86790 VIRUS ANTIBODY NOS: CPT | Performed by: INTERNAL MEDICINE

## 2024-06-17 PROCEDURE — 36415 COLL VENOUS BLD VENIPUNCTURE: CPT | Performed by: INTERNAL MEDICINE

## 2024-06-17 PROCEDURE — 87517 HEPATITIS B DNA QUANT: CPT | Performed by: INTERNAL MEDICINE

## 2024-06-17 PROCEDURE — 87340 HEPATITIS B SURFACE AG IA: CPT | Performed by: INTERNAL MEDICINE

## 2024-06-17 PROCEDURE — 86704 HEP B CORE ANTIBODY TOTAL: CPT | Performed by: INTERNAL MEDICINE

## 2024-06-17 PROCEDURE — 3008F BODY MASS INDEX DOCD: CPT | Mod: CPTII,S$GLB,, | Performed by: INTERNAL MEDICINE

## 2024-06-17 PROCEDURE — 86706 HEP B SURFACE ANTIBODY: CPT | Mod: 91 | Performed by: INTERNAL MEDICINE

## 2024-06-17 PROCEDURE — 80076 HEPATIC FUNCTION PANEL: CPT | Performed by: INTERNAL MEDICINE

## 2024-06-17 PROCEDURE — 99999 PR PBB SHADOW E&M-EST. PATIENT-LVL III: CPT | Mod: PBBFAC,,, | Performed by: INTERNAL MEDICINE

## 2024-06-17 PROCEDURE — 1159F MED LIST DOCD IN RCRD: CPT | Mod: CPTII,S$GLB,, | Performed by: INTERNAL MEDICINE

## 2024-06-17 NOTE — PROGRESS NOTES
Hepatology Consult Note    Referring provider: Flor Hernandez  PCP: Raul Adkins MD    Chief complaint: elevated liver enzymes     HPI:  Yahaira Hinson is a 73 y.o. female with overweight, HTN, HLD, cholelithiasis and cholecystomy (1998), thalassemia, who was referred to Hepatology Clinic for evaluation of elevated liver enzymes. Denies prior diagnosis of cirrhosis.    Notes being admitted to [facility] in late April and May 2024 after experiencing severe sharp epigastric abdominal pain. Not associated with N/V. Not sure if there were any triggering factors. Per chart review, liver enzymes were elevated. The patient underwent extensive serological workup for causes of elevated LFTs, which was unrevealing. US and CT without biliary dilation. The patient states she was unable to have MRCP due to claustrophobia. LFTs improved spontaneously, and the patient was discharged to follow up with [facility] and Hepatology.    Regarding risk factors for liver disease, The patient denies prior history of EtOH abuse, illicit drug use, blood transfusions, prior tattoos. Denies history of obese, DM, BEVERLY. Denies family history of liver disease or liver cancer.     Reports having episode of jaundice at the time of having gallbladder removed. Notes she had to return to the hospital ~1 week after cholecystectomy with jaundice and was taken back to operating room, but she does not remember the details.     The patient denies prior evaluation by hepatologist, liver biopsy.    Past Medical History:   Diagnosis Date    Cataract     Glaucoma (increased eye pressure)     H/O bilateral oophorectomy     Hx of cholecystectomy     Hypertension     Pure hypercholesterolemia     Thalassemia        Past Surgical History:   Procedure Laterality Date    BILATERAL OOPHORECTOMY      for cysts    CATARACT EXTRACTION Right     about 1998    CHOLECYSTECTOMY  1998    FL REMOVAL OF OVARY/TUBE(S)      TRABECULECTOMY Right 6/20/2019    Procedure: G6/MP3 LASER;  " Surgeon: Doroteo Beaver MD;  Location: [facility] OR [address] [place] [address];  Service: Ophthalmology;  Laterality: Right;       Family History   Problem Relation Name Age of Onset    Asthma Father      Arthritis Father      Ovarian cancer Mother      Hypertension Sister      No Known Problems Brother      Breast cancer Maternal Grandmother      No Known Problems Brother      No Known Problems Brother      Colon cancer Neg Hx         Social History     Tobacco Use    Smoking status: Former     Current packs/day: 0.00     Average packs/day: 0.3 packs/day for 11.0 years (3.3 ttl pk-yrs)     Types: Cigarettes     Start date: 1996     Quit date: 2007     Years since quittin.8    Smokeless tobacco: Never   Substance Use Topics    Alcohol use: Not Currently     Comment: social drinker    Drug use: No       Current Outpatient Medications   Medication Sig Dispense Refill    atenoloL-chlorthalidone (TENORETIC) 50-25 mg Tab Take 1 tablet by mouth every morning. 90 tablet 3    atorvastatin (LIPITOR) 10 MG tablet Take 1 tablet (10 mg total) by mouth once daily.      brimonidine 0.1% (ALPHAGAN P) 0.1 % Drop INSTILL ONE DROP IN BOTH EYES THREE TIMES DAILY 45 mL 4    dorzolamide-timolol 2-0.5% (COSOPT) 22.3-6.8 mg/mL ophthalmic solution INSTILL 1 DROP IN BOTH EYES THREE TIMES DAILY 30 mL 4    fluorometholone 0.1% (FML) 0.1 % DrpS SHAKE LIQUID AND INSTILL 1 DROP IN BOTH EYES EVERY DAY 10 mL 6    latanoprost 0.005 % ophthalmic solution Place 1 drop into both eyes every evening. 7.5 mL 4    potassium chloride (KLOR-CON) 10 MEQ TbSR Take 1 tablet (10 mEq total) by mouth once daily. 90 tablet 3     No current facility-administered medications for this visit.       Review of patient's allergies indicates:  No Known Allergies         Vitals:    24 1445   BP: (!) 150/84   Pulse: 69   Weight: 64.3 kg (141 lb 12.1 oz)   Height: 5' 2" (1.575 m)     Body mass index is 25.93 kg/m².    Physical Exam  Constitutional:       General: She is " not in acute distress.  Eyes:      General: No scleral icterus.  Cardiovascular:      Rate and Rhythm: Normal rate.   Pulmonary:      Effort: Pulmonary effort is normal.   Abdominal:      General: Abdomen is flat. There is no distension.      Palpations: Abdomen is soft. There is no fluid wave, hepatomegaly or splenomegaly.      Tenderness: There is no abdominal tenderness.   Musculoskeletal:      Right lower leg: No edema.      Left lower leg: No edema.   Skin:     General: Skin is warm.      Comments: No spider angiomas. No palmar erythema. No leukonychia.    Neurological:      General: No focal deficit present.      Mental Status: She is alert and oriented to person, place, and time.   Psychiatric:         Behavior: Behavior is cooperative.         Thought Content: Thought content normal.         LABS: I personally reviewed pertinent laboratory findings.    Lab Results   Component Value Date    WBC 7.27 05/15/2024    HGB 10.9 (L) 05/15/2024    HCT 35.0 (L) 05/15/2024    MCV 69 (L) 05/15/2024     05/15/2024       Lab Results   Component Value Date     05/15/2024    K 3.7 05/15/2024    CL 99 05/15/2024    CO2 27 05/15/2024    BUN 22 05/15/2024    CREATININE 1.0 05/15/2024    CALCIUM 10.3 05/15/2024    ANIONGAP 11 05/15/2024    ESTGFRAFRICA 58.8 (A) 07/25/2022    EGFRNONAA 51.0 (A) 07/25/2022       Lab Results   Component Value Date    ALT 88 (H) 05/15/2024    AST 39 05/15/2024    ALKPHOS 357 (H) 05/15/2024    BILITOT 0.9 05/15/2024       Lab Results   Component Value Date    HEPAIGM Non-reactive 04/30/2024    HEPBIGM Non-reactive 04/30/2024    HEPCAB Non-reactive 04/30/2024       Lab Results   Component Value Date    SMOOTHMUSCAB Negative 1:40 04/30/2024    CERULOPLSM 33.0 04/30/2024        IMAGING: I personally reviewed imaging studies.    US Abdomen Limited 5/10/2024  FINDINGS:  Liver: Diffusely increased hepatic parenchymal echogenicity.  Otherwise, the liver is normal in size and morphology without  appreciable surface nodularity measuring 11.1-cm.  Minimal pneumobilia is present.  No appreciable sizable vascular hepatic parenchymal lesions or intrahepatic biliary dilatation.  Normal hepatopetal flow is noted in the main portal vein.     Gallbladder: Status post cholecystectomy.     Biliary system: Common duct is not dilated, measuring 5-mm.  No extrahepatic biliary ductal dilatation.     Pancreas: Pancreatic head/body is normal in size, morphology and echogenicity without appreciable fluid collection or sizable vascular lesion. Limited evaluation of the pancreatic tail secondary to overlying bowel gas.     Spleen: The spleen is normal in size, location, echogenicity and morphology measuring 11.0 x 3.3-cm.     Impression:     1. Diffuse hepatic steatosis.  2. Status post cholecystectomy.    Assessment:  Yahaira Hinson is a 73 y.o. female with overweight, HTN, HLD, cholelithiasis and cholecystomy (1998), thalassemia, who was referred to Hepatology Clinic for evaluation of elevated liver enzymes. Per chart review, liver enzymes were previously elevated in a mixed pattern (ALP ~4x ULN, ALT ~6x ULN) in late April 2024 with improvement on recheck in the middle of May 2024.  Platelet count, INR are normal. Workup is notable for +DANIEL, -ASMA, -AMA, and negative viral hepatitis panel. On US (5/10/24), the liver is normal in size and contour with features of hepatic steatosis, no biliary dilation, and spleen is normal in size. On contrasted CT (5/9/24), fatty infiltration of the liver is seen, along with mild pneumobilia, normal spleen, no ascites.     Suspect de christopher stone formation / biliary sludge as the etiology of the patient's abdominal pain and abnormal liver enzymes. Recommend repeating LFTs since last checked 4 weeks ago. If persistently elevated, then recommend MRI / MRCP +/- liver biopsy.     1. Elevated LFTs    2. Other specified diseases of liver    3. History of cholecystectomy    4. Abdominal pain,  unspecified abdominal location    5. Hepatic steatosis      Recommendations:  - LFTs, INR   - HAV IgG, HBsAg, HBV DNA, HCV RNA   - FibroScan  - Consider MRI abd w/wo con with MRCP  - Consider liver biopsy   - Consider UDCA     Return to clinic in 3 months.    I have sent communication to the referring physician and/or primary care provider.    Nataliia Chen MD  Staff Physician  Hepatology and Liver Transplant  [facility]  [facility] [place]

## 2024-06-18 DIAGNOSIS — Z90.49 HISTORY OF CHOLECYSTECTOMY: ICD-10-CM

## 2024-06-18 DIAGNOSIS — R10.9 ABDOMINAL PAIN, UNSPECIFIED ABDOMINAL LOCATION: ICD-10-CM

## 2024-06-18 DIAGNOSIS — R79.89 ELEVATED LFTS: Primary | ICD-10-CM

## 2024-06-18 LAB
HAV IGG SER QL IA: NORMAL
HBV CORE AB SERPL QL IA: NORMAL
HBV SURFACE AB SER-ACNC: <3 MIU/ML
HBV SURFACE AB SER-ACNC: NORMAL M[IU]/ML
HBV SURFACE AG SERPL QL IA: NORMAL

## 2024-06-19 LAB
HEPATITIS B VIRUS DNA: NORMAL
HEPATITIS B VIRUS PCR, QUANT: NOT DETECTED IU/ML

## 2024-07-03 ENCOUNTER — TELEPHONE (OUTPATIENT)
Dept: HEPATOLOGY | Facility: CLINIC | Age: 74
End: 2024-07-03
Payer: MEDICARE

## 2024-07-03 NOTE — TELEPHONE ENCOUNTER
----- Message from Nataliia Chen MD sent at 6/17/2024  3:34 PM CDT -----  Please help the patient schedule a FibroScan. Thank you.

## 2024-07-20 DIAGNOSIS — E78.5 DYSLIPIDEMIA: ICD-10-CM

## 2024-07-20 DIAGNOSIS — I10 ESSENTIAL HYPERTENSION: ICD-10-CM

## 2024-07-20 RX ORDER — ATORVASTATIN CALCIUM 10 MG/1
10 TABLET, FILM COATED ORAL
Qty: 90 TABLET | Refills: 3 | Status: SHIPPED | OUTPATIENT
Start: 2024-07-20

## 2024-07-20 NOTE — TELEPHONE ENCOUNTER
Refill Routing Note   Medication(s) are not appropriate for processing by [facility] for the following reason(s):      Required vitals abnormal    [facility] action(s):  Approve  Defer Care Due:  None identified            Appointments  past 12m or future 3m with PCP    Date Provider   Last Visit   5/15/2024 Raul Adkins MD   Next Visit   8/8/2024 Raul Adkins MD   ED visits in past 90 days: 0        Note composed:1:15 PM 07/20/2024

## 2024-07-20 NOTE — TELEPHONE ENCOUNTER
No care due was identified.  [facility] Embedded Care Due Messages. Reference number: [id number].   7/20/2024 9:47:01 AM CDT

## 2024-07-21 RX ORDER — ATENOLOL AND CHLORTHALIDONE TABLET 50; 25 MG/1; MG/1
1 TABLET ORAL EVERY MORNING
Qty: 90 TABLET | Refills: 3 | Status: SHIPPED | OUTPATIENT
Start: 2024-07-21

## 2024-07-31 ENCOUNTER — PATIENT OUTREACH (OUTPATIENT)
Dept: ADMINISTRATIVE | Facility: HOSPITAL | Age: 74
End: 2024-07-31
Payer: MEDICARE

## 2024-07-31 DIAGNOSIS — N18.31 STAGE 3A CHRONIC KIDNEY DISEASE: Primary | Chronic | ICD-10-CM

## 2024-08-01 ENCOUNTER — LAB VISIT (OUTPATIENT)
Dept: LAB | Facility: HOSPITAL | Age: 74
End: 2024-08-01
Attending: INTERNAL MEDICINE
Payer: MEDICARE

## 2024-08-01 DIAGNOSIS — N18.31 STAGE 3A CHRONIC KIDNEY DISEASE: ICD-10-CM

## 2024-08-01 DIAGNOSIS — E78.5 DYSLIPIDEMIA: ICD-10-CM

## 2024-08-01 LAB
25(OH)D3+25(OH)D2 SERPL-MCNC: 39 NG/ML (ref 30–96)
ALBUMIN SERPL BCP-MCNC: 4.1 G/DL (ref 3.5–5.2)
ALP SERPL-CCNC: 184 U/L (ref 55–135)
ALT SERPL W/O P-5'-P-CCNC: 54 U/L (ref 10–44)
ANION GAP SERPL CALC-SCNC: 10 MMOL/L (ref 8–16)
AST SERPL-CCNC: 38 U/L (ref 10–40)
BILIRUB SERPL-MCNC: 0.7 MG/DL (ref 0.1–1)
BUN SERPL-MCNC: 21 MG/DL (ref 8–23)
CALCIUM SERPL-MCNC: 10.2 MG/DL (ref 8.7–10.5)
CHLORIDE SERPL-SCNC: 102 MMOL/L (ref 95–110)
CHOLEST SERPL-MCNC: 205 MG/DL (ref 120–199)
CHOLEST/HDLC SERPL: 2.6 {RATIO} (ref 2–5)
CO2 SERPL-SCNC: 29 MMOL/L (ref 23–29)
CREAT SERPL-MCNC: 1.1 MG/DL (ref 0.5–1.4)
ERYTHROCYTE [DISTWIDTH] IN BLOOD BY AUTOMATED COUNT: 15.8 % (ref 11.5–14.5)
EST. GFR  (NO RACE VARIABLE): 53.4 ML/MIN/1.73 M^2
GLUCOSE SERPL-MCNC: 97 MG/DL (ref 70–110)
HCT VFR BLD AUTO: 39 % (ref 37–48.5)
HDLC SERPL-MCNC: 80 MG/DL (ref 40–75)
HDLC SERPL: 39 % (ref 20–50)
HGB BLD-MCNC: 11.6 G/DL (ref 12–16)
LDLC SERPL CALC-MCNC: 96 MG/DL (ref 63–159)
MCH RBC QN AUTO: 21 PG (ref 27–31)
MCHC RBC AUTO-ENTMCNC: 29.7 G/DL (ref 32–36)
MCV RBC AUTO: 71 FL (ref 82–98)
NONHDLC SERPL-MCNC: 125 MG/DL
PHOSPHATE SERPL-MCNC: 3.1 MG/DL (ref 2.7–4.5)
PLATELET # BLD AUTO: 237 K/UL (ref 150–450)
PMV BLD AUTO: 9.9 FL (ref 9.2–12.9)
POTASSIUM SERPL-SCNC: 3.5 MMOL/L (ref 3.5–5.1)
PROT SERPL-MCNC: 7.9 G/DL (ref 6–8.4)
PTH-INTACT SERPL-MCNC: 75.5 PG/ML (ref 9–77)
RBC # BLD AUTO: 5.53 M/UL (ref 4–5.4)
SODIUM SERPL-SCNC: 141 MMOL/L (ref 136–145)
TRIGL SERPL-MCNC: 145 MG/DL (ref 30–150)
WBC # BLD AUTO: 7.49 K/UL (ref 3.9–12.7)

## 2024-08-01 PROCEDURE — 85027 COMPLETE CBC AUTOMATED: CPT | Performed by: INTERNAL MEDICINE

## 2024-08-01 PROCEDURE — 80053 COMPREHEN METABOLIC PANEL: CPT | Performed by: INTERNAL MEDICINE

## 2024-08-01 PROCEDURE — 84100 ASSAY OF PHOSPHORUS: CPT | Performed by: INTERNAL MEDICINE

## 2024-08-01 PROCEDURE — 82306 VITAMIN D 25 HYDROXY: CPT | Performed by: INTERNAL MEDICINE

## 2024-08-01 PROCEDURE — 83970 ASSAY OF PARATHORMONE: CPT | Performed by: INTERNAL MEDICINE

## 2024-08-01 PROCEDURE — 36415 COLL VENOUS BLD VENIPUNCTURE: CPT | Mod: PO | Performed by: INTERNAL MEDICINE

## 2024-08-01 PROCEDURE — 80061 LIPID PANEL: CPT | Performed by: INTERNAL MEDICINE

## 2024-08-08 ENCOUNTER — OFFICE VISIT (OUTPATIENT)
Dept: FAMILY MEDICINE | Facility: CLINIC | Age: 74
End: 2024-08-08
Payer: MEDICARE

## 2024-08-08 VITALS
SYSTOLIC BLOOD PRESSURE: 120 MMHG | OXYGEN SATURATION: 96 % | BODY MASS INDEX: 26.8 KG/M2 | DIASTOLIC BLOOD PRESSURE: 60 MMHG | WEIGHT: 145.63 LBS | TEMPERATURE: 98 F | HEIGHT: 62 IN | HEART RATE: 63 BPM

## 2024-08-08 DIAGNOSIS — E87.6 DIURETIC-INDUCED HYPOKALEMIA: ICD-10-CM

## 2024-08-08 DIAGNOSIS — R74.01 TRANSAMINITIS: ICD-10-CM

## 2024-08-08 DIAGNOSIS — E80.6 HYPERBILIRUBINEMIA: ICD-10-CM

## 2024-08-08 DIAGNOSIS — N18.31 STAGE 3A CHRONIC KIDNEY DISEASE: Chronic | ICD-10-CM

## 2024-08-08 DIAGNOSIS — E78.5 DYSLIPIDEMIA: Chronic | ICD-10-CM

## 2024-08-08 DIAGNOSIS — I10 ESSENTIAL HYPERTENSION: Primary | Chronic | ICD-10-CM

## 2024-08-08 DIAGNOSIS — T50.2X5A DIURETIC-INDUCED HYPOKALEMIA: ICD-10-CM

## 2024-08-08 PROCEDURE — 1159F MED LIST DOCD IN RCRD: CPT | Mod: CPTII,S$GLB,, | Performed by: INTERNAL MEDICINE

## 2024-08-08 PROCEDURE — 1101F PT FALLS ASSESS-DOCD LE1/YR: CPT | Mod: CPTII,S$GLB,, | Performed by: INTERNAL MEDICINE

## 2024-08-08 PROCEDURE — 3066F NEPHROPATHY DOC TX: CPT | Mod: CPTII,S$GLB,, | Performed by: INTERNAL MEDICINE

## 2024-08-08 PROCEDURE — 3061F NEG MICROALBUMINURIA REV: CPT | Mod: CPTII,S$GLB,, | Performed by: INTERNAL MEDICINE

## 2024-08-08 PROCEDURE — 1160F RVW MEDS BY RX/DR IN RCRD: CPT | Mod: CPTII,S$GLB,, | Performed by: INTERNAL MEDICINE

## 2024-08-08 PROCEDURE — 3008F BODY MASS INDEX DOCD: CPT | Mod: CPTII,S$GLB,, | Performed by: INTERNAL MEDICINE

## 2024-08-08 PROCEDURE — 3288F FALL RISK ASSESSMENT DOCD: CPT | Mod: CPTII,S$GLB,, | Performed by: INTERNAL MEDICINE

## 2024-08-08 PROCEDURE — 99214 OFFICE O/P EST MOD 30 MIN: CPT | Mod: S$GLB,,, | Performed by: INTERNAL MEDICINE

## 2024-08-08 PROCEDURE — 1126F AMNT PAIN NOTED NONE PRSNT: CPT | Mod: CPTII,S$GLB,, | Performed by: INTERNAL MEDICINE

## 2024-08-08 PROCEDURE — 3074F SYST BP LT 130 MM HG: CPT | Mod: CPTII,S$GLB,, | Performed by: INTERNAL MEDICINE

## 2024-08-08 PROCEDURE — 99999 PR PBB SHADOW E&M-EST. PATIENT-LVL IV: CPT | Mod: PBBFAC,,, | Performed by: INTERNAL MEDICINE

## 2024-08-08 PROCEDURE — 3078F DIAST BP <80 MM HG: CPT | Mod: CPTII,S$GLB,, | Performed by: INTERNAL MEDICINE

## 2024-08-19 ENCOUNTER — OFFICE VISIT (OUTPATIENT)
Dept: OPHTHALMOLOGY | Facility: CLINIC | Age: 74
End: 2024-08-19
Payer: MEDICARE

## 2024-08-19 DIAGNOSIS — Z96.1 STATUS POST CATARACT EXTRACTION AND INSERTION OF INTRAOCULAR LENS OF RIGHT EYE: ICD-10-CM

## 2024-08-19 DIAGNOSIS — Z97.0 EYE GLOBE PROSTHESIS: ICD-10-CM

## 2024-08-19 DIAGNOSIS — Q15.0 CONGENITAL GLAUCOMA OF BOTH EYES: Primary | ICD-10-CM

## 2024-08-19 DIAGNOSIS — Z94.7 TRANSPLANTED CORNEA: ICD-10-CM

## 2024-08-19 DIAGNOSIS — H54.10 BLINDNESS AND LOW VISION: ICD-10-CM

## 2024-08-19 DIAGNOSIS — Z98.41 STATUS POST CATARACT EXTRACTION AND INSERTION OF INTRAOCULAR LENS OF RIGHT EYE: ICD-10-CM

## 2024-08-19 PROCEDURE — 99213 OFFICE O/P EST LOW 20 MIN: CPT | Mod: S$GLB,,, | Performed by: OPHTHALMOLOGY

## 2024-08-19 PROCEDURE — 1101F PT FALLS ASSESS-DOCD LE1/YR: CPT | Mod: CPTII,S$GLB,, | Performed by: OPHTHALMOLOGY

## 2024-08-19 PROCEDURE — 1159F MED LIST DOCD IN RCRD: CPT | Mod: CPTII,S$GLB,, | Performed by: OPHTHALMOLOGY

## 2024-08-19 PROCEDURE — 1160F RVW MEDS BY RX/DR IN RCRD: CPT | Mod: CPTII,S$GLB,, | Performed by: OPHTHALMOLOGY

## 2024-08-19 PROCEDURE — 1126F AMNT PAIN NOTED NONE PRSNT: CPT | Mod: CPTII,S$GLB,, | Performed by: OPHTHALMOLOGY

## 2024-08-19 PROCEDURE — 3288F FALL RISK ASSESSMENT DOCD: CPT | Mod: CPTII,S$GLB,, | Performed by: OPHTHALMOLOGY

## 2024-08-19 PROCEDURE — 3066F NEPHROPATHY DOC TX: CPT | Mod: CPTII,S$GLB,, | Performed by: OPHTHALMOLOGY

## 2024-08-19 PROCEDURE — 3061F NEG MICROALBUMINURIA REV: CPT | Mod: CPTII,S$GLB,, | Performed by: OPHTHALMOLOGY

## 2024-08-19 PROCEDURE — G2211 COMPLEX E/M VISIT ADD ON: HCPCS | Mod: S$GLB,,, | Performed by: OPHTHALMOLOGY

## 2024-08-19 PROCEDURE — 99999 PR PBB SHADOW E&M-EST. PATIENT-LVL III: CPT | Mod: PBBFAC,,, | Performed by: OPHTHALMOLOGY

## 2024-08-19 NOTE — PROGRESS NOTES
"        Assessment /Plan     For exam results, see Encounter Report.    Congenital glaucoma of both eyes    Blindness and low vision    Eye globe prosthesis    Transplanted cornea    Status post cataract extraction and insertion of intraocular lens of right eye      89 yo Mom  2019     with DM --> currently getting under control          2023  URI improving with Blepharitis  No Infiltrates / dendrites  EES q HS x 1 week      Traumatic Glaucoma OD  Congenital Glaucoma  Prosthesis OS    Blindness  Va ADLs stable per patient    OCT RNFL OD ==> unable to obtain Valid signal    CCT  530 OD    Mid-low teens --> achieved // Close & discussed --> discussed options to push lower    Right Eye -->Tolerating well &  good adherence --> CSM  "MTMT"  Cosopt TID  Alphagan TID  Xal q day --> consider Rocklatan  FML 5 days / week     Giovani TID --> holding  Beni q day --> not covered    Diamox 250 BID --> Intol Hypo K --> now on K suppl -->  Holding  Tolerated well in past    Right eye MP3 / G6 Laser 2019     --> G-Probe --> reconsider repeat  as discussed  Pre-Tx @ 27, 19, 24      PC IOL OD  Quiet  Observe    Monocular precautions    Prosthesis  Conj bed quiet 2019  fu with       Plan  RTC 4 months IOP & Adherence & try OCT RNFL or Photos  RTC sooner prn with good understanding           "

## 2024-09-03 ENCOUNTER — PATIENT MESSAGE (OUTPATIENT)
Dept: ADMINISTRATIVE | Facility: HOSPITAL | Age: 74
End: 2024-09-03
Payer: MEDICARE

## 2024-09-09 ENCOUNTER — LAB VISIT (OUTPATIENT)
Dept: LAB | Facility: HOSPITAL | Age: 74
End: 2024-09-09
Attending: INTERNAL MEDICINE
Payer: MEDICARE

## 2024-09-09 DIAGNOSIS — R10.9 ABDOMINAL PAIN, UNSPECIFIED ABDOMINAL LOCATION: ICD-10-CM

## 2024-09-09 DIAGNOSIS — Z90.49 HISTORY OF CHOLECYSTECTOMY: ICD-10-CM

## 2024-09-09 DIAGNOSIS — R79.89 ELEVATED LFTS: ICD-10-CM

## 2024-09-09 LAB
ALBUMIN SERPL BCP-MCNC: 4.1 G/DL (ref 3.5–5.2)
ALP SERPL-CCNC: 153 U/L (ref 55–135)
ALT SERPL W/O P-5'-P-CCNC: 43 U/L (ref 10–44)
AST SERPL-CCNC: 32 U/L (ref 10–40)
BILIRUB DIRECT SERPL-MCNC: 0.2 MG/DL (ref 0.1–0.3)
BILIRUB SERPL-MCNC: 0.5 MG/DL (ref 0.1–1)
INR PPP: 0.9 (ref 0.8–1.2)
PROT SERPL-MCNC: 7.7 G/DL (ref 6–8.4)
PROTHROMBIN TIME: 10 SEC (ref 9–12.5)

## 2024-09-09 PROCEDURE — 85610 PROTHROMBIN TIME: CPT | Performed by: INTERNAL MEDICINE

## 2024-09-09 PROCEDURE — 36415 COLL VENOUS BLD VENIPUNCTURE: CPT | Performed by: INTERNAL MEDICINE

## 2024-09-09 PROCEDURE — 80076 HEPATIC FUNCTION PANEL: CPT | Performed by: INTERNAL MEDICINE

## 2024-09-10 ENCOUNTER — PROCEDURE VISIT (OUTPATIENT)
Dept: HEPATOLOGY | Facility: CLINIC | Age: 74
End: 2024-09-10
Payer: MEDICARE

## 2024-09-10 DIAGNOSIS — R79.89 ELEVATED LFTS: ICD-10-CM

## 2024-09-10 PROCEDURE — 91200 LIVER ELASTOGRAPHY: CPT | Mod: S$GLB,,, | Performed by: INTERNAL MEDICINE

## 2024-09-10 NOTE — PROCEDURES
FibroScan (Vibration Controlled Transient Elastography)    Date/Time: 9/10/2024 8:15 AM    Performed by: Zara Das MA  Authorized by: Nataliia Chen MD    Diagnosis:  NAFLD    Probe:  M    Universal Protocol: Patient's identity, procedure and site were verified, confirmatory pause was performed.  Discussed procedure including risks and potential complications.  Questions answered.  Patient verbalizes understanding and wishes to proceed with VCTE.     Procedure: After providing explanations of the procedure, patient was placed in the supine position with right arm in maximum abduction to allow optimal exposure of right lateral abdomen.  Patient was briefly assessed, Testing was performed in the mid-axillary location, 50Hz Shear Wave pulses were applied and the resulting Shear Wave and Propagation Speed detected with a 3.5 MHz ultrasonic signal, using the FibroScan probe, Skin to liver capsule distance and liver parenchyma were accessed during the entire examination with the FibroScan probe, Patient was instructed to breathe normally and to abstain from sudden movements during the procedure, allowing for random measurements of liver stiffness. At least 10 Shear Waves were produced, Individual measurements of each Shear Wave were calculated.  Patient tolerated the procedure well with no complications.  Meets discharge criteria as was dismissed.  Rates pain 0 out of 10.  Patient will follow up with ordering provider to review results.    Findings  Median liver stiffness score:  5.3  CAP Reading: dB/m:  253    Interpretation  Fibrosis interpretation is based on medial liver stiffness - Kilopascal (kPa).    Fibrosis Stage:  F 0-1  Steatosis interpretation is based on controlled attenuation parameter - (dB/m).    Steatosis Grade:  <S1

## 2024-09-16 ENCOUNTER — OFFICE VISIT (OUTPATIENT)
Dept: HEPATOLOGY | Facility: CLINIC | Age: 74
End: 2024-09-16
Payer: MEDICARE

## 2024-09-16 VITALS
SYSTOLIC BLOOD PRESSURE: 155 MMHG | DIASTOLIC BLOOD PRESSURE: 82 MMHG | WEIGHT: 148.81 LBS | HEART RATE: 70 BPM | HEIGHT: 62 IN | BODY MASS INDEX: 27.38 KG/M2

## 2024-09-16 DIAGNOSIS — K76.9 LIVER DISEASE, UNSPECIFIED: ICD-10-CM

## 2024-09-16 DIAGNOSIS — R79.89 ELEVATED LFTS: ICD-10-CM

## 2024-09-16 DIAGNOSIS — Z90.49 HISTORY OF CHOLECYSTECTOMY: ICD-10-CM

## 2024-09-16 DIAGNOSIS — R10.9 ABDOMINAL PAIN, UNSPECIFIED ABDOMINAL LOCATION: ICD-10-CM

## 2024-09-16 PROCEDURE — 3061F NEG MICROALBUMINURIA REV: CPT | Mod: CPTII,S$GLB,, | Performed by: INTERNAL MEDICINE

## 2024-09-16 PROCEDURE — 99999 PR PBB SHADOW E&M-EST. PATIENT-LVL III: CPT | Mod: PBBFAC,,, | Performed by: INTERNAL MEDICINE

## 2024-09-16 PROCEDURE — 3079F DIAST BP 80-89 MM HG: CPT | Mod: CPTII,S$GLB,, | Performed by: INTERNAL MEDICINE

## 2024-09-16 PROCEDURE — 3008F BODY MASS INDEX DOCD: CPT | Mod: CPTII,S$GLB,, | Performed by: INTERNAL MEDICINE

## 2024-09-16 PROCEDURE — 1126F AMNT PAIN NOTED NONE PRSNT: CPT | Mod: CPTII,S$GLB,, | Performed by: INTERNAL MEDICINE

## 2024-09-16 PROCEDURE — 3288F FALL RISK ASSESSMENT DOCD: CPT | Mod: CPTII,S$GLB,, | Performed by: INTERNAL MEDICINE

## 2024-09-16 PROCEDURE — 99213 OFFICE O/P EST LOW 20 MIN: CPT | Mod: S$GLB,,, | Performed by: INTERNAL MEDICINE

## 2024-09-16 PROCEDURE — 3066F NEPHROPATHY DOC TX: CPT | Mod: CPTII,S$GLB,, | Performed by: INTERNAL MEDICINE

## 2024-09-16 PROCEDURE — 1101F PT FALLS ASSESS-DOCD LE1/YR: CPT | Mod: CPTII,S$GLB,, | Performed by: INTERNAL MEDICINE

## 2024-09-16 PROCEDURE — 3077F SYST BP >= 140 MM HG: CPT | Mod: CPTII,S$GLB,, | Performed by: INTERNAL MEDICINE

## 2024-09-16 PROCEDURE — 1159F MED LIST DOCD IN RCRD: CPT | Mod: CPTII,S$GLB,, | Performed by: INTERNAL MEDICINE

## 2024-09-16 PROCEDURE — 1160F RVW MEDS BY RX/DR IN RCRD: CPT | Mod: CPTII,S$GLB,, | Performed by: INTERNAL MEDICINE

## 2024-09-16 RX ORDER — URSODIOL 300 MG/1
300 CAPSULE ORAL 2 TIMES DAILY
Qty: 60 CAPSULE | Refills: 11 | Status: SHIPPED | OUTPATIENT
Start: 2024-09-16 | End: 2025-09-16

## 2024-09-16 NOTE — PROGRESS NOTES
Hepatology Follow Up Note    Referring provider: No ref. provider found  PCP: Raul Adkins MD    Chief complaint: follow up elevated liver enzymes     Last seen in clinic on: 2024    Brief HPI:  Yahaira Hinson is a 73 y.o. female with elevated liver enzymes, HTN, HLD, cholelithiasis and cholecystomy (), thalassemia,  presents for scheduled follow up.    Interval Events:  - Accompanied by , Domingo.  - Feels well. Denies acute complaints.  - The patient denies abdominal pain, nausea, vomiting, jaundice.  - Denies recent ED visits, hospital admissions.  - This is the extent of the patient's complaints at this time.      Past Medical History:   Diagnosis Date    Cataract     Glaucoma (increased eye pressure)     H/O bilateral oophorectomy     Hx of cholecystectomy     Hypertension     Pure hypercholesterolemia     Thalassemia        Past Surgical History:   Procedure Laterality Date    BILATERAL OOPHORECTOMY      for cysts    CATARACT EXTRACTION Right     about     CHOLECYSTECTOMY      ID REMOVAL OF OVARY/TUBE(S)      TRABECULECTOMY Right 2019    Procedure: G6/MP3 LASER;  Surgeon: Doroteo Beaver MD;  Location: [address];  Service: Ophthalmology;  Laterality: Right;       Family History   Problem Relation Name Age of Onset    Asthma Father      Arthritis Father      Ovarian cancer Mother      Hypertension Sister      No Known Problems Brother      Breast cancer Maternal Grandmother      No Known Problems Brother      No Known Problems Brother      Colon cancer Neg Hx         Social History     Tobacco Use    Smoking status: Former     Current packs/day: 0.00     Average packs/day: 0.3 packs/day for 11.0 years (3.3 ttl pk-yrs)     Types: Cigarettes     Start date: 1996     Quit date: 2007     Years since quittin.1    Smokeless tobacco: Never   Substance Use Topics    Alcohol use: Not Currently     Comment: social drinker    Drug use: No       Current Outpatient  "Medications   Medication Sig Dispense Refill    atenoloL-chlorthalidone (TENORETIC) 50-25 mg Tab TAKE 1 TABLET BY MOUTH EVERY MORNING 90 tablet 3    atorvastatin (LIPITOR) 10 MG tablet TAKE 1 TABLET(10 MG) BY MOUTH EVERY DAY 90 tablet 3    brimonidine 0.1% (ALPHAGAN P) 0.1 % Drop INSTILL ONE DROP IN BOTH EYES THREE TIMES DAILY 45 mL 4    dorzolamide-timolol 2-0.5% (COSOPT) 22.3-6.8 mg/mL ophthalmic solution INSTILL 1 DROP IN BOTH EYES THREE TIMES DAILY 30 mL 4    fluorometholone 0.1% (FML) 0.1 % DrpS SHAKE LIQUID AND INSTILL 1 DROP IN BOTH EYES EVERY DAY 10 mL 6    latanoprost 0.005 % ophthalmic solution Place 1 drop into both eyes every evening. 7.5 mL 4    potassium chloride (KLOR-CON) 10 MEQ TbSR Take 1 tablet (10 mEq total) by mouth once daily. 90 tablet 3    ursodioL (ACTIGALL) 300 mg capsule Take 1 capsule (300 mg total) by mouth 2 (two) times daily. 60 capsule 11     No current facility-administered medications for this visit.       Review of patient's allergies indicates:  No Known Allergies         Vitals:    09/16/24 1444   BP: (!) 155/82   Pulse: 70   Weight: 67.5 kg (148 lb 13 oz)   Height: 5' 2" (1.575 m)     Body mass index is 27.22 kg/m².    Physical Exam  Constitutional:       General: She is not in acute distress.  Eyes:      General: No scleral icterus.  Cardiovascular:      Rate and Rhythm: Normal rate.   Pulmonary:      Effort: Pulmonary effort is normal.   Abdominal:      General: There is no distension.   Skin:     Coloration: Skin is not jaundiced.   Neurological:      General: No focal deficit present.      Mental Status: She is alert.   Psychiatric:         Thought Content: Thought content normal.       LABS: I personally reviewed pertinent laboratory findings.    Lab Results   Component Value Date    WBC 7.49 08/01/2024    HGB 11.6 (L) 08/01/2024    HCT 39.0 08/01/2024    MCV 71 (L) 08/01/2024     08/01/2024       Lab Results   Component Value Date     08/01/2024    K 3.5 " 08/01/2024     08/01/2024    CO2 29 08/01/2024    BUN 21 08/01/2024    CREATININE 1.1 08/01/2024    CALCIUM 10.2 08/01/2024    ANIONGAP 10 08/01/2024    ESTGFRAFRICA 58.8 (A) 07/25/2022    EGFRNONAA 51.0 (A) 07/25/2022       Lab Results   Component Value Date    ALT 43 09/09/2024    AST 32 09/09/2024    ALKPHOS 153 (H) 09/09/2024    BILITOT 0.5 09/09/2024       Lab Results   Component Value Date    HEPAIGM Non-reactive 04/30/2024    HEPBIGM Non-reactive 04/30/2024    HEPBCAB Non-reactive 06/17/2024    HEPCAB Non-reactive 04/30/2024       Lab Results   Component Value Date    SMOOTHMUSCAB Negative 1:40 04/30/2024    CERULOPLSM 33.0 04/30/2024        MELD 3.0: 11 at 5/10/2024  5:44 AM  MELD-Na: 9 at 5/10/2024  5:44 AM  Calculated from:  Serum Creatinine: 1.0 mg/dL at 5/10/2024  5:44 AM  Serum Sodium: 139 mmol/L (Using max of 137 mmol/L) at 5/10/2024  5:44 AM  Total Bilirubin: 2.2 mg/dL at 5/10/2024  5:44 AM  Serum Albumin: 3.4 g/dL at 5/10/2024  5:44 AM  INR(ratio): 1.0 at 5/9/2024  3:22 PM  Age at listing (hypothetical): 73 years  Sex: Female at 5/10/2024  5:44 AM       IMAGING: I personally reviewed imaging studies.      Assessment:   Yahaira Hinson is a 73 y.o. female with elevated liver enzymes, HTN, HLD, cholelithiasis and cholecystomy (1998), thalassemia,  presents for scheduled follow up. Per chart review, liver enzymes were previously elevated in a mixed pattern (ALP ~4x ULN, ALT ~6x ULN) in late April 2024 with improvement on recheck in the middle of May 2024. Platelet count, INR are normal. Workup is notable for +DANIEL, -ASMA, -AMA, and negative viral hepatitis panel. On US (5/10/24), the liver is normal in size and contour with features of hepatic steatosis, no biliary dilation, and spleen is normal in size. On contrasted CT (5/9/24), fatty infiltration of the liver is seen, along with mild pneumobilia, normal spleen, no ascites. FibroScan (Sept 2024) suggests no hepatic fibrosis or  steatosis.    Suspect de christopher stone formation / biliary sludge as the etiology of the patient's abdominal pain and abnormal liver enzymes. Fortunately, liver enzymes continue to improve spontaneously, although ALP remains mildly elevated. Recommend starting ursodiol for biliary prophylaxis.    1. Elevated LFTs    2. History of cholecystectomy    3. Abdominal pain, unspecified abdominal location    4. Liver disease, unspecified      Recommendations:  - LFTs, INR in 3 months   - Start UDCA 300mg BID  - Recommend HAV and HBV vaccination   - Avoid herbal supplements     Return to clinic in 6 months.    Nataliia Chen MD  Staff Physician  Hepatology and Liver Transplant  [facility]  [facility] [place]

## 2024-12-09 ENCOUNTER — LAB VISIT (OUTPATIENT)
Dept: LAB | Facility: HOSPITAL | Age: 74
End: 2024-12-09
Attending: INTERNAL MEDICINE
Payer: MEDICARE

## 2024-12-09 DIAGNOSIS — Z90.49 HISTORY OF CHOLECYSTECTOMY: ICD-10-CM

## 2024-12-09 DIAGNOSIS — R10.9 ABDOMINAL PAIN, UNSPECIFIED ABDOMINAL LOCATION: ICD-10-CM

## 2024-12-09 DIAGNOSIS — K76.9 LIVER DISEASE, UNSPECIFIED: ICD-10-CM

## 2024-12-09 DIAGNOSIS — R79.89 ELEVATED LFTS: ICD-10-CM

## 2024-12-09 LAB
ALBUMIN SERPL BCP-MCNC: 4.1 G/DL (ref 3.5–5.2)
ALP SERPL-CCNC: 130 U/L (ref 40–150)
ALT SERPL W/O P-5'-P-CCNC: 40 U/L (ref 10–44)
AST SERPL-CCNC: 29 U/L (ref 10–40)
BILIRUB DIRECT SERPL-MCNC: 0.3 MG/DL (ref 0.1–0.3)
BILIRUB SERPL-MCNC: 0.8 MG/DL (ref 0.1–1)
GGT SERPL-CCNC: 100 U/L (ref 8–55)
INR PPP: 0.9 (ref 0.8–1.2)
PROT SERPL-MCNC: 7.8 G/DL (ref 6–8.4)
PROTHROMBIN TIME: 10.2 SEC (ref 9–12.5)

## 2024-12-09 PROCEDURE — 85610 PROTHROMBIN TIME: CPT | Performed by: INTERNAL MEDICINE

## 2024-12-09 PROCEDURE — 80076 HEPATIC FUNCTION PANEL: CPT | Performed by: INTERNAL MEDICINE

## 2024-12-09 PROCEDURE — 36415 COLL VENOUS BLD VENIPUNCTURE: CPT | Performed by: INTERNAL MEDICINE

## 2024-12-09 PROCEDURE — 82977 ASSAY OF GGT: CPT | Performed by: INTERNAL MEDICINE

## 2024-12-16 ENCOUNTER — OFFICE VISIT (OUTPATIENT)
Dept: OPHTHALMOLOGY | Facility: CLINIC | Age: 74
End: 2024-12-16
Payer: MEDICARE

## 2024-12-16 DIAGNOSIS — Z97.0 EYE GLOBE PROSTHESIS: ICD-10-CM

## 2024-12-16 DIAGNOSIS — Q15.0 CONGENITAL GLAUCOMA OF BOTH EYES: ICD-10-CM

## 2024-12-16 DIAGNOSIS — Z94.7 TRANSPLANTED CORNEA: ICD-10-CM

## 2024-12-16 DIAGNOSIS — Z96.1 PSEUDOPHAKIA OF RIGHT EYE: ICD-10-CM

## 2024-12-16 DIAGNOSIS — H40.31X3 GLAUCOMA OF RIGHT EYE ASSOCIATED WITH OCULAR TRAUMA, SEVERE STAGE: Primary | ICD-10-CM

## 2024-12-16 DIAGNOSIS — H54.10 BLINDNESS AND LOW VISION: ICD-10-CM

## 2024-12-16 PROBLEM — Z98.41 STATUS POST CATARACT EXTRACTION AND INSERTION OF INTRAOCULAR LENS OF RIGHT EYE: Status: RESOLVED | Noted: 2018-07-16 | Resolved: 2024-12-16

## 2024-12-16 PROCEDURE — 3061F NEG MICROALBUMINURIA REV: CPT | Mod: CPTII,S$GLB,, | Performed by: OPHTHALMOLOGY

## 2024-12-16 PROCEDURE — 3066F NEPHROPATHY DOC TX: CPT | Mod: CPTII,S$GLB,, | Performed by: OPHTHALMOLOGY

## 2024-12-16 PROCEDURE — 1159F MED LIST DOCD IN RCRD: CPT | Mod: CPTII,S$GLB,, | Performed by: OPHTHALMOLOGY

## 2024-12-16 PROCEDURE — 1160F RVW MEDS BY RX/DR IN RCRD: CPT | Mod: CPTII,S$GLB,, | Performed by: OPHTHALMOLOGY

## 2024-12-16 PROCEDURE — 99213 OFFICE O/P EST LOW 20 MIN: CPT | Mod: S$GLB,,, | Performed by: OPHTHALMOLOGY

## 2024-12-16 PROCEDURE — 99999 PR PBB SHADOW E&M-EST. PATIENT-LVL I: CPT | Mod: PBBFAC,,, | Performed by: OPHTHALMOLOGY

## 2024-12-16 PROCEDURE — G2211 COMPLEX E/M VISIT ADD ON: HCPCS | Mod: S$GLB,,, | Performed by: OPHTHALMOLOGY

## 2024-12-16 NOTE — PROGRESS NOTES
"        Assessment /Plan     For exam results, see Encounter Report.    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, severe stage    Congenital glaucoma of both eyes    Blindness and low vision    Eye globe prosthesis    Transplanted cornea    Pseudophakia of right eye      89 yo Mom  2019     with DM --> currently getting under control          2023  URI improving with Blepharitis  No Infiltrates / dendrites  EES q HS x 1 week      Traumatic Glaucoma OD  Congenital Glaucoma  Prosthesis OS    Blindness  Va ADLs stable per patient    OCT RNFL OD ==> unable to obtain Valid signal --> try photos    CCT  530 OD    Mid-low teens --> achieved & discussed    Right Eye -->Tolerating well &  good adherence --> CSM  "MTMT"  Cosopt TID  Alphagan TID  Xal q day --> consider Rocklatan  FML 5 days / week     Giovani TID --> holding  Beni q day --> not covered    Diamox 250 BID --> Intol Hypo K --> now on K suppl -->  Holding  Tolerated well in past    Right eye MP3 / G6 Laser 2019     --> G-Probe --> reconsider repeat  Pre-Tx @ 27, 19, 24      PC IOL OD  Quiet  Observe    Monocular precautions    Prosthesis  Conj bed quiet 2019  fu with       Plan  RTC 3 months IOP & Adherence &  Photos  RTC sooner prn with good understanding           "

## 2024-12-23 DIAGNOSIS — I10 ESSENTIAL HYPERTENSION: ICD-10-CM

## 2024-12-23 RX ORDER — POTASSIUM CHLORIDE 750 MG/1
10 TABLET, EXTENDED RELEASE ORAL
Qty: 90 TABLET | Refills: 2 | Status: SHIPPED | OUTPATIENT
Start: 2024-12-23

## 2024-12-23 NOTE — TELEPHONE ENCOUNTER
No care due was identified.  [facility] Embedded Care Due Messages. Reference number: [id number].   12/23/2024 9:03:59 AM CST

## 2024-12-24 NOTE — TELEPHONE ENCOUNTER
Refill Decision Note   Yahaira Hinson  is requesting a refill authorization.  Brief Assessment and Rationale for Refill:  Approve     Medication Therapy Plan:         Comments:     Note composed:7:01 PM 12/23/2024

## 2025-02-03 ENCOUNTER — LAB VISIT (OUTPATIENT)
Dept: LAB | Facility: HOSPITAL | Age: 75
End: 2025-02-03
Attending: INTERNAL MEDICINE
Payer: MEDICARE

## 2025-02-03 DIAGNOSIS — R74.01 TRANSAMINITIS: ICD-10-CM

## 2025-02-03 DIAGNOSIS — E78.5 DYSLIPIDEMIA: Chronic | ICD-10-CM

## 2025-02-03 DIAGNOSIS — E80.6 HYPERBILIRUBINEMIA: ICD-10-CM

## 2025-02-03 LAB
ALBUMIN SERPL BCP-MCNC: 4 G/DL (ref 3.5–5.2)
ALP SERPL-CCNC: 109 U/L (ref 40–150)
ALT SERPL W/O P-5'-P-CCNC: 30 U/L (ref 10–44)
ANION GAP SERPL CALC-SCNC: 12 MMOL/L (ref 8–16)
AST SERPL-CCNC: 26 U/L (ref 10–40)
BILIRUB SERPL-MCNC: 0.6 MG/DL (ref 0.1–1)
BUN SERPL-MCNC: 22 MG/DL (ref 8–23)
CALCIUM SERPL-MCNC: 9.9 MG/DL (ref 8.7–10.5)
CHLORIDE SERPL-SCNC: 103 MMOL/L (ref 95–110)
CHOLEST SERPL-MCNC: 185 MG/DL (ref 120–199)
CHOLEST/HDLC SERPL: 2.3 {RATIO} (ref 2–5)
CO2 SERPL-SCNC: 26 MMOL/L (ref 23–29)
CREAT SERPL-MCNC: 1.1 MG/DL (ref 0.5–1.4)
ERYTHROCYTE [DISTWIDTH] IN BLOOD BY AUTOMATED COUNT: 15.5 % (ref 11.5–14.5)
EST. GFR  (NO RACE VARIABLE): 53.1 ML/MIN/1.73 M^2
GLUCOSE SERPL-MCNC: 89 MG/DL (ref 70–110)
HCT VFR BLD AUTO: 36.2 % (ref 37–48.5)
HDLC SERPL-MCNC: 81 MG/DL (ref 40–75)
HDLC SERPL: 43.8 % (ref 20–50)
HGB BLD-MCNC: 10.8 G/DL (ref 12–16)
LDLC SERPL CALC-MCNC: 81.8 MG/DL (ref 63–159)
MCH RBC QN AUTO: 20.8 PG (ref 27–31)
MCHC RBC AUTO-ENTMCNC: 29.8 G/DL (ref 32–36)
MCV RBC AUTO: 70 FL (ref 82–98)
NONHDLC SERPL-MCNC: 104 MG/DL
PLATELET # BLD AUTO: 226 K/UL (ref 150–450)
PMV BLD AUTO: 10.1 FL (ref 9.2–12.9)
POTASSIUM SERPL-SCNC: 3.6 MMOL/L (ref 3.5–5.1)
PROT SERPL-MCNC: 7.8 G/DL (ref 6–8.4)
RBC # BLD AUTO: 5.18 M/UL (ref 4–5.4)
SODIUM SERPL-SCNC: 141 MMOL/L (ref 136–145)
TRIGL SERPL-MCNC: 111 MG/DL (ref 30–150)
WBC # BLD AUTO: 6.57 K/UL (ref 3.9–12.7)

## 2025-02-03 PROCEDURE — 85027 COMPLETE CBC AUTOMATED: CPT | Performed by: INTERNAL MEDICINE

## 2025-02-03 PROCEDURE — 36415 COLL VENOUS BLD VENIPUNCTURE: CPT | Mod: PO | Performed by: INTERNAL MEDICINE

## 2025-02-03 PROCEDURE — 80053 COMPREHEN METABOLIC PANEL: CPT | Performed by: INTERNAL MEDICINE

## 2025-02-03 PROCEDURE — 80061 LIPID PANEL: CPT | Performed by: INTERNAL MEDICINE

## 2025-02-07 NOTE — PROGRESS NOTES
This note was created by combination of typed  and M-Modal dictation.  Transcription errors may be present.   This note was also generated with the assistance of ambient listening technology. Verbal consent was obtained by the patient and accompanying visitor(s) for the recording of patient appointment to facilitate this note. I attest to having reviewed and edited the generated note for accuracy, though some syntax or spelling errors may persist. Please contact the author of this note for any clarification.    Assessment and Plan:   Assessment and Plan    Essential hypertension  Stage 3a chronic kidney disease  Diuretic-induced hypokalemia  -pre visit labs reviewed.  Stable.  Blood pressure today is good.  No changes.  -     Comprehensive Metabolic Panel; Future; Expected date: 08/07/2025  -     Lipid Panel; Future; Expected date: 08/07/2025  -     Phosphorus; Future; Expected date: 08/07/2025  -     Vitamin D; Future; Expected date: 08/07/2025  -     PTH, Intact; Future; Expected date: 08/07/2025  -     CBC Without Differential; Future; Expected date: 08/07/2025    Dyslipidemia  -lipid profile is good.  No changes.  No changes  -     Lipid Panel; Future; Expected date: 08/07/2025    Status post cholecystectomy 1998 still gets bile sludge in the bile duct; Dr. Ca  Hyperbilirubinemia  Transaminitis  -on ursodiol.  Pre visit labs transaminitis normalized.  No changes.  Monitor.    Thalassemia, unspecified type    Encounter for screening mammogram for malignant neoplasm of breast  -     Mammo Digital Screening Bilat; Future; Expected date: 02/10/2025    Needs flu shot  -     influenza (adjuvanted) (Fluad) 45 mcg/0.5 mL IM vaccine (> or = 66 yo) 0.5 mL          There are no discontinued medications.    meds sent this encounter:  Medications Ordered This Encounter   Medications    influenza (adjuvanted) (Fluad) 45 mcg/0.5 mL IM vaccine (> or = 66 yo) 0.5 mL         Follow Up:  Follow-up 6 months with  lab  Future Appointments   Date Time Provider Department Center   2/10/2025  8:20 AM Raul Adkins MD [facility]         Subjective:   Subjective   Chief Complaint   Patient presents with    Follow-up       HPI  Yahaira is a 74 y.o. female.    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 Patient is postmenopausal.    Last appointment with this clinic was Visit date not found. Last visit with me 2024   To summarize last visit and events leading up to today:  Hypertension, CKD stage IIIA, hypokalemia, on K supplement   Hyperlipidemia/statin  Transaminitis patient declined MRCP.  She did not want sedation for MRCP.  Open MRI will not give sufficient resolution  Hospitalization  through 2024 for abdominal pain.  initial presentation of right upper quadrant and epigastric abdominal pain.  Found to have transaminitis.  Patient declined MRCP.  Subsequent hospitalization  through 05/10/2024 for recurrence of abdominal pain. Ultrasound  shows diffuse hepatic steatosis and status post cholecystectomy and no biliary dilatation.   5/9/24 CT abd/pelvis Mild fatty infiltration of the liver.  Mild pneumobilia.  Cholecystectomy.  Colonic diverticulosis.  5/10/24 abd US 1. Diffuse hepatic steatosis. 2. Status post cholecystectomy.  Saw hepatology 06/17/2024.  Suspected De christopher stone formation and biliary sludge and recommended MRI/MRCP and/or liver biopsy  Osteopenia.  Needs to update DEXA scan but she was wary at that time b/c of concerns about polypharmacy if dx'd with osteoporosis.  If bisphosphonate indicated, her creatinine can support it. Ca and vit D supplement.   02/08/2024 DEXA L-spine-0.5, total hip-0.2, femoral neck-0.6. FRAX score 0.8/8.6%. Wide variation in BMD between L3 and L4.  XR L spine: Grade 1 anterolisthesis at L4-5. No compression fracture. No evidence of pars defect. No focal osseous lesion. Multilevel degenerative disc disease and facet arthropathy, lower lumbar predominant  Post menopause with dryness, not taking topical estrogen.  Using OTC black cohosh, advised to stop     Last visit me 08/08/2024  Hypertension CKD 3A.  diuretic induced hypokalemia.  Stable.    Hyperlipidemia taking low-dose statin.  LFTs trending down.  Okay to continue   Transaminitis she did not want to pursue MRCP due to claustrophobia.  Saw hepatology and plan was to trend LFTs and if persistently elevated consider biopsy.    09/09/2024  Liver function tests alk-phos still elevated though trending down.  AST, ALT normal range  09/10/2024 FibroScan  Fibrosis Stage: F 0-1  Steatosis Grade: <S1     Saw hepatology in follow-up 09/16/2024.  Suspected De christopher stone formation or biliary sludge.  Start ursodiol for biliary prophylaxis.  Follow-up 6    12/09/2024 labs  INR normal   LFTs continue improvement with alk phos now in normal range  GGT elevated    Pre visit labs  CBC microcytic anemia known thalassemia  CMP creatinine 1.1 stable CKD 3A   Lipid good  LFTs  normal  Today's visit:    History of Present Illness    HPI:  Yahaira reports recent weight measurements were higher than anticipated, but blood pressure remains good and heart rate is normal. She has been taking atenolol chlorothalidone for blood pressure, atorvastatin for cholesterol, a potassium supplement, and ursodiol, initiated 5-6 months ago, with no significant side effects.    Yahaira acknowledges a preference for sweet foods but is willing to reduce consumption. She describes her typical diet, including a turkey sandwich for lunch, avocado and egg for breakfast, and varied dinners. She reports difficulty incorporating vegetables into her diet.    Recent lab results show improvements in liver tests, with ALKFOS continuing to decrease. Yahaira's kidney function test is stable for chronic kidney disease. She has stable blood counts but remains anemic due to thalassemia. Cholesterol levels are reported as good, with total cholesterol at 185 mg/dL, triglycerides at 111 mg/dL, HDL at 81 mg/dL, and LDL at 81 mg/dL.    Yahaira reports having had a mammogram this year, though records indicate the last one on file was from August 2023.    Yahaira denies chest pain, shortness of breath, or coughing during physical activity. She also denies problems with digestion, diarrhea, or constipation. Yahaira denies burning or seeing blood when urinating, and problems with urinary leakage.    MEDICATIONS:  - Atenolol chlorothalidone, for blood pressure  - Atorvastatin, for cholesterol  - Potassium supplement  - Ursodiol, started about 5-6 months ago  - Tums, as needed for heartburn    ROS:  Cardiovascular: no chest pain  Respiratory: no cough, no shortness of breath  Gastrointestinal: no diarrhea, no constipation  Genitourinary: no hematuria, no urinary incontinence             ALLERGIES AND MEDICATIONS: updated and reviewed.  Medication List with Changes/Refills   Current Medications    ATENOLOL-CHLORTHALIDONE  (TENORETIC) 50-25 MG TAB    TAKE 1 TABLET BY MOUTH EVERY MORNING    ATORVASTATIN (LIPITOR) 10 MG TABLET    TAKE 1 TABLET(10 MG) BY MOUTH EVERY DAY    BRIMONIDINE 0.1% (ALPHAGAN P) 0.1 % DROP    INSTILL ONE DROP IN BOTH EYES THREE TIMES DAILY    DORZOLAMIDE-TIMOLOL 2-0.5% (COSOPT) 22.3-6.8 MG/ML OPHTHALMIC SOLUTION    INSTILL 1 DROP IN BOTH EYES THREE TIMES DAILY    FLUOROMETHOLONE 0.1% (FML) 0.1 % DRPS    SHAKE LIQUID AND INSTILL 1 DROP IN BOTH EYES EVERY DAY    LATANOPROST 0.005 % OPHTHALMIC SOLUTION    Place 1 drop into both eyes every evening.    POTASSIUM CHLORIDE (KLOR-CON) 10 MEQ TBSR    TAKE 1 TABLET(10 MEQ) BY MOUTH EVERY DAY    URSODIOL (ACTIGALL) 300 MG CAPSULE    Take 1 capsule (300 mg total) by mouth 2 (two) times daily.         Objective:   Objective   Physical Exam   Vitals:    02/10/25 0802   BP: 120/70   Pulse: 63   Temp: 98 °F (36.7 °C)   TempSrc: Oral   SpO2: 96%   Weight: 72.2 kg (159 lb 2.8 oz)   Height: 5' (1.524 m)    Body mass index is 31.09 kg/m².  Weight: 72.2 kg (159 lb 2.8 oz)   Height: 5' (152.4 cm)     Physical Exam  Constitutional:       General: She is not in acute distress.     Appearance: She is well-developed.   HENT:      Right Ear: Tympanic membrane, ear canal and external ear normal.      Left Ear: Tympanic membrane, ear canal and external ear normal.   Eyes:      General: No scleral icterus.     Comments: Left eye prosthesis   Cardiovascular:      Rate and Rhythm: Normal rate and regular rhythm.      Heart sounds: Normal heart sounds. No murmur heard.  Pulmonary:      Effort: Pulmonary effort is normal.      Breath sounds: Normal breath sounds. No wheezing.   Abdominal:      General: There is no distension.      Palpations: Abdomen is soft. There is no hepatomegaly, splenomegaly or mass.      Tenderness: There is no abdominal tenderness. There is no guarding.   Musculoskeletal:         General: No deformity. Normal range of motion.      Cervical back: Neck supple.      Right  lower leg: No edema.      Left lower leg: No edema.   Lymphadenopathy:      Cervical: No cervical adenopathy.   Skin:     General: Skin is warm and dry.      Findings: No rash.      Comments: On exposed skin   Neurological:      Mental Status: She is alert and oriented to person, place, and time.      Coordination: Coordination normal.      Deep Tendon Reflexes: Reflexes are normal and symmetric.   Psychiatric:         Behavior: Behavior normal.         Thought Content: Thought content normal.         Judgment: Judgment normal.         Component      Latest Ref Rng 8/1/2024 9/9/2024 12/9/2024   Sodium      136 - 145 mmol/L 141      Potassium      3.5 - 5.1 mmol/L 3.5      Chloride      95 - 110 mmol/L 102      CO2      23 - 29 mmol/L 29      Glucose      70 - 110 mg/dL 97      BUN      8 - 23 mg/dL 21      Creatinine      0.5 - 1.4 mg/dL 1.1      Calcium      8.7 - 10.5 mg/dL 10.2      PROTEIN TOTAL      6.0 - 8.4 g/dL 7.9  7.7  7.8    Albumin      3.5 - 5.2 g/dL 4.1  4.1  4.1    BILIRUBIN TOTAL      0.1 - 1.0 mg/dL 0.7  0.5  0.8    ALP      40 - 150 U/L 184 (H)  153 (H)  130    AST      10 - 40 U/L 38  32  29    ALT      10 - 44 U/L 54 (H)  43  40    eGFR      >60 mL/min/1.73 m^2 53.4 !      Anion Gap      8 - 16 mmol/L 10      WBC      3.90 - 12.70 K/uL 7.49      RBC      4.00 - 5.40 M/uL 5.53 (H)      Hemoglobin      12.0 - 16.0 g/dL 11.6 (L)      Hematocrit      37.0 - 48.5 % 39.0      MCV      82 - 98 fL 71 (L)      MCH      27.0 - 31.0 pg 21.0 (L)      MCHC      32.0 - 36.0 g/dL 29.7 (L)      RDW      11.5 - 14.5 % 15.8 (H)      Platelet Count      150 - 450 K/uL 237      MPV      9.2 - 12.9 fL 9.9      Cholesterol Total      120 - 199 mg/dL 205 (H)      Triglycerides      30 - 150 mg/dL 145      HDL      40 - 75 mg/dL 80 (H)      LDL Cholesterol      63.0 - 159.0 mg/dL 96.0      HDL/Cholesterol Ratio      20.0 - 50.0 % 39.0      Total Cholesterol/HDL Ratio      2.0 - 5.0  2.6      Non-HDL Cholesterol       mg/dL 125      Bilirubin Direct      0.1 - 0.3 mg/dL  0.2  0.3    Urine Microalbumin      ug/mL <5.0      Urine Creatinine      15.0 - 325.0 mg/dL 73.0      MICROALB/CREAT RATIO      0.0 - 30.0 ug/mg Unable to calculate      Phosphorus Level      2.7 - 4.5 mg/dL 3.1      Vitamin D      30 - 96 ng/mL 39      PTH      9.0 - 77.0 pg/mL 75.5        Component      Latest Ref Rng 2/3/2025   Sodium      136 - 145 mmol/L 141    Potassium      3.5 - 5.1 mmol/L 3.6    Chloride      95 - 110 mmol/L 103    CO2      23 - 29 mmol/L 26    Glucose      70 - 110 mg/dL 89    BUN      8 - 23 mg/dL 22    Creatinine      0.5 - 1.4 mg/dL 1.1    Calcium      8.7 - 10.5 mg/dL 9.9    PROTEIN TOTAL      6.0 - 8.4 g/dL 7.8    Albumin      3.5 - 5.2 g/dL 4.0    BILIRUBIN TOTAL      0.1 - 1.0 mg/dL 0.6    ALP      40 - 150 U/L 109    AST      10 - 40 U/L 26    ALT      10 - 44 U/L 30    eGFR      >60 mL/min/1.73 m^2 53.1 !    Anion Gap      8 - 16 mmol/L 12    WBC      3.90 - 12.70 K/uL 6.57    RBC      4.00 - 5.40 M/uL 5.18    Hemoglobin      12.0 - 16.0 g/dL 10.8 (L)    Hematocrit      37.0 - 48.5 % 36.2 (L)    MCV      82 - 98 fL 70 (L)    MCH      27.0 - 31.0 pg 20.8 (L)    MCHC      32.0 - 36.0 g/dL 29.8 (L)    RDW      11.5 - 14.5 % 15.5 (H)    Platelet Count      150 - 450 K/uL 226    MPV      9.2 - 12.9 fL 10.1    Cholesterol Total      120 - 199 mg/dL 185    Triglycerides      30 - 150 mg/dL 111    HDL      40 - 75 mg/dL 81 (H)    LDL Cholesterol      63.0 - 159.0 mg/dL 81.8    HDL/Cholesterol Ratio      20.0 - 50.0 % 43.8    Total Cholesterol/HDL Ratio      2.0 - 5.0  2.3    Non-HDL Cholesterol      mg/dL 104    Bilirubin Direct      0.1 - 0.3 mg/dL    Urine Microalbumin      ug/mL    Urine Creatinine      15.0 - 325.0 mg/dL    MICROALB/CREAT RATIO      0.0 - 30.0 ug/mg    Phosphorus Level      2.7 - 4.5 mg/dL    Vitamin D      30 - 96 ng/mL    PTH      9.0 - 77.0 pg/mL       Legend:  (H) High  ! Abnormal  (L) Low

## 2025-02-10 ENCOUNTER — OFFICE VISIT (OUTPATIENT)
Dept: FAMILY MEDICINE | Facility: CLINIC | Age: 75
End: 2025-02-10
Payer: MEDICARE

## 2025-02-10 VITALS
DIASTOLIC BLOOD PRESSURE: 70 MMHG | SYSTOLIC BLOOD PRESSURE: 120 MMHG | OXYGEN SATURATION: 96 % | WEIGHT: 159.19 LBS | HEIGHT: 60 IN | HEART RATE: 63 BPM | TEMPERATURE: 98 F | BODY MASS INDEX: 31.25 KG/M2

## 2025-02-10 DIAGNOSIS — E78.5 DYSLIPIDEMIA: Chronic | ICD-10-CM

## 2025-02-10 DIAGNOSIS — R74.01 TRANSAMINITIS: ICD-10-CM

## 2025-02-10 DIAGNOSIS — I10 ESSENTIAL HYPERTENSION: Primary | Chronic | ICD-10-CM

## 2025-02-10 DIAGNOSIS — D56.9 THALASSEMIA, UNSPECIFIED TYPE: ICD-10-CM

## 2025-02-10 DIAGNOSIS — Z12.31 ENCOUNTER FOR SCREENING MAMMOGRAM FOR MALIGNANT NEOPLASM OF BREAST: ICD-10-CM

## 2025-02-10 DIAGNOSIS — E87.6 DIURETIC-INDUCED HYPOKALEMIA: ICD-10-CM

## 2025-02-10 DIAGNOSIS — Z90.49 STATUS POST CHOLECYSTECTOMY: ICD-10-CM

## 2025-02-10 DIAGNOSIS — Z23 NEEDS FLU SHOT: ICD-10-CM

## 2025-02-10 DIAGNOSIS — T50.2X5A DIURETIC-INDUCED HYPOKALEMIA: ICD-10-CM

## 2025-02-10 DIAGNOSIS — N18.31 STAGE 3A CHRONIC KIDNEY DISEASE: ICD-10-CM

## 2025-02-10 DIAGNOSIS — E80.6 HYPERBILIRUBINEMIA: ICD-10-CM

## 2025-02-10 PROCEDURE — 3288F FALL RISK ASSESSMENT DOCD: CPT | Mod: CPTII,S$GLB,, | Performed by: INTERNAL MEDICINE

## 2025-02-10 PROCEDURE — 1101F PT FALLS ASSESS-DOCD LE1/YR: CPT | Mod: CPTII,S$GLB,, | Performed by: INTERNAL MEDICINE

## 2025-02-10 PROCEDURE — 90653 IIV ADJUVANT VACCINE IM: CPT | Mod: S$GLB,,, | Performed by: INTERNAL MEDICINE

## 2025-02-10 PROCEDURE — G0008 ADMIN INFLUENZA VIRUS VAC: HCPCS | Mod: S$GLB,,, | Performed by: INTERNAL MEDICINE

## 2025-02-10 PROCEDURE — 99214 OFFICE O/P EST MOD 30 MIN: CPT | Mod: S$GLB,,, | Performed by: INTERNAL MEDICINE

## 2025-02-10 PROCEDURE — 1159F MED LIST DOCD IN RCRD: CPT | Mod: CPTII,S$GLB,, | Performed by: INTERNAL MEDICINE

## 2025-02-10 PROCEDURE — 1126F AMNT PAIN NOTED NONE PRSNT: CPT | Mod: CPTII,S$GLB,, | Performed by: INTERNAL MEDICINE

## 2025-02-10 PROCEDURE — 3074F SYST BP LT 130 MM HG: CPT | Mod: CPTII,S$GLB,, | Performed by: INTERNAL MEDICINE

## 2025-02-10 PROCEDURE — 99999 PR PBB SHADOW E&M-EST. PATIENT-LVL IV: CPT | Mod: PBBFAC,,, | Performed by: INTERNAL MEDICINE

## 2025-02-10 PROCEDURE — 3078F DIAST BP <80 MM HG: CPT | Mod: CPTII,S$GLB,, | Performed by: INTERNAL MEDICINE

## 2025-02-10 PROCEDURE — 3008F BODY MASS INDEX DOCD: CPT | Mod: CPTII,S$GLB,, | Performed by: INTERNAL MEDICINE

## 2025-02-10 PROCEDURE — 1160F RVW MEDS BY RX/DR IN RCRD: CPT | Mod: CPTII,S$GLB,, | Performed by: INTERNAL MEDICINE

## 2025-02-10 PROCEDURE — G2211 COMPLEX E/M VISIT ADD ON: HCPCS | Mod: S$GLB,,, | Performed by: INTERNAL MEDICINE

## 2025-02-25 DIAGNOSIS — H40.31X3 GLAUCOMA OF RIGHT EYE ASSOCIATED WITH OCULAR TRAUMA, SEVERE STAGE: ICD-10-CM

## 2025-02-25 DIAGNOSIS — I10 ESSENTIAL HYPERTENSION: ICD-10-CM

## 2025-02-25 DIAGNOSIS — H40.1113 PRIMARY OPEN ANGLE GLAUCOMA (POAG) OF RIGHT EYE, SEVERE STAGE: ICD-10-CM

## 2025-02-25 NOTE — TELEPHONE ENCOUNTER
No care due was identified.  [facility] Embedded Care Due Messages. Reference number: [id number].   2/25/2025 4:31:58 PM CST

## 2025-02-26 RX ORDER — POTASSIUM CHLORIDE 750 MG/1
10 TABLET, EXTENDED RELEASE ORAL
Qty: 90 TABLET | Refills: 3 | Status: SHIPPED | OUTPATIENT
Start: 2025-02-26

## 2025-02-26 NOTE — TELEPHONE ENCOUNTER
Refill Decision Note   Yahaira Hinson  is requesting a refill authorization.  Brief Assessment and Rationale for Refill:  Approve     Medication Therapy Plan:         Comments:     Note composed:12:04 PM 02/26/2025

## 2025-02-27 RX ORDER — FLUOROMETHOLONE 1 MG/ML
SUSPENSION/ DROPS OPHTHALMIC
Qty: 10 ML | Refills: 6 | Status: SHIPPED | OUTPATIENT
Start: 2025-02-27

## 2025-02-27 RX ORDER — LATANOPROST 50 UG/ML
1 SOLUTION/ DROPS OPHTHALMIC NIGHTLY
Qty: 7.5 ML | Refills: 4 | Status: SHIPPED | OUTPATIENT
Start: 2025-02-27

## 2025-03-03 DIAGNOSIS — H40.31X3 GLAUCOMA OF RIGHT EYE ASSOCIATED WITH OCULAR TRAUMA, SEVERE STAGE: ICD-10-CM

## 2025-03-03 RX ORDER — FLUOROMETHOLONE 1 MG/ML
1 SUSPENSION/ DROPS OPHTHALMIC DAILY
Qty: 10 ML | Refills: 6 | Status: SHIPPED | OUTPATIENT
Start: 2025-03-03

## 2025-03-05 ENCOUNTER — HOSPITAL ENCOUNTER (OUTPATIENT)
Dept: RADIOLOGY | Facility: HOSPITAL | Age: 75
Discharge: HOME | End: 2025-03-05
Attending: INTERNAL MEDICINE
Payer: MEDICARE

## 2025-03-05 DIAGNOSIS — Z12.31 ENCOUNTER FOR SCREENING MAMMOGRAM FOR MALIGNANT NEOPLASM OF BREAST: ICD-10-CM

## 2025-03-05 PROCEDURE — 77067 SCR MAMMO BI INCL CAD: CPT | Mod: TC,PO

## 2025-03-05 PROCEDURE — 77067 SCR MAMMO BI INCL CAD: CPT | Mod: 26,,, | Performed by: RADIOLOGY

## 2025-03-05 PROCEDURE — 77063 BREAST TOMOSYNTHESIS BI: CPT | Mod: 26,,, | Performed by: RADIOLOGY

## 2025-03-24 DIAGNOSIS — Z00.00 ENCOUNTER FOR MEDICARE ANNUAL WELLNESS EXAM: ICD-10-CM

## 2025-04-24 DIAGNOSIS — E78.5 DYSLIPIDEMIA: ICD-10-CM

## 2025-04-24 RX ORDER — ATORVASTATIN CALCIUM 10 MG/1
10 TABLET, FILM COATED ORAL
Qty: 90 TABLET | Refills: 3 | Status: SHIPPED | OUTPATIENT
Start: 2025-04-24

## 2025-04-24 NOTE — TELEPHONE ENCOUNTER
No care due was identified.  [facility] Embedded Care Due Messages. Reference number: [id number].   4/24/2025 2:35:23 PM CDT

## 2025-04-24 NOTE — TELEPHONE ENCOUNTER
Refill Decision Note   Yahaira Hinson  is requesting a refill authorization.  Brief Assessment and Rationale for Refill:  Approve     Medication Therapy Plan:         Comments:     Note composed:6:13 PM 04/24/2025

## 2025-05-05 ENCOUNTER — PATIENT MESSAGE (OUTPATIENT)
Dept: HEPATOLOGY | Facility: CLINIC | Age: 75
End: 2025-05-05
Payer: MEDICARE

## 2025-05-05 DIAGNOSIS — K76.9 LIVER DISEASE: ICD-10-CM

## 2025-05-05 DIAGNOSIS — R74.8 ABNORMAL LIVER ENZYMES: Primary | ICD-10-CM

## 2025-07-21 ENCOUNTER — OFFICE VISIT (OUTPATIENT)
Dept: OPHTHALMOLOGY | Facility: CLINIC | Age: 75
End: 2025-07-21
Payer: MEDICARE

## 2025-07-21 DIAGNOSIS — H54.10 BLINDNESS AND LOW VISION: ICD-10-CM

## 2025-07-21 DIAGNOSIS — Q15.0 CONGENITAL GLAUCOMA OF BOTH EYES: ICD-10-CM

## 2025-07-21 DIAGNOSIS — H40.31X3 GLAUCOMA OF RIGHT EYE ASSOCIATED WITH OCULAR TRAUMA, SEVERE STAGE: Primary | ICD-10-CM

## 2025-07-21 DIAGNOSIS — Z94.7 TRANSPLANTED CORNEA: ICD-10-CM

## 2025-07-21 DIAGNOSIS — Z96.1 PSEUDOPHAKIA OF RIGHT EYE: ICD-10-CM

## 2025-07-21 PROCEDURE — 1160F RVW MEDS BY RX/DR IN RCRD: CPT | Mod: CPTII,S$GLB,, | Performed by: OPHTHALMOLOGY

## 2025-07-21 PROCEDURE — 1159F MED LIST DOCD IN RCRD: CPT | Mod: CPTII,S$GLB,, | Performed by: OPHTHALMOLOGY

## 2025-07-21 PROCEDURE — 1126F AMNT PAIN NOTED NONE PRSNT: CPT | Mod: CPTII,S$GLB,, | Performed by: OPHTHALMOLOGY

## 2025-07-21 PROCEDURE — 99999 PR PBB SHADOW E&M-EST. PATIENT-LVL II: CPT | Mod: PBBFAC,,, | Performed by: OPHTHALMOLOGY

## 2025-07-21 PROCEDURE — G2211 COMPLEX E/M VISIT ADD ON: HCPCS | Mod: S$GLB,,, | Performed by: OPHTHALMOLOGY

## 2025-07-21 PROCEDURE — 99214 OFFICE O/P EST MOD 30 MIN: CPT | Mod: S$GLB,,, | Performed by: OPHTHALMOLOGY

## 2025-07-21 NOTE — PROGRESS NOTES
"        Assessment /Plan     For exam results, see Encounter Report.    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, severe stage    Pseudophakia of right eye    Congenital glaucoma of both eyes    Blindness and low vision    Transplanted cornea      93 yo Mom  2019     with DM --> currently getting under control          2023  URI improving with Blepharitis  No Infiltrates / dendrites  EES q HS x 1 week      Traumatic Glaucoma OD  Congenital Glaucoma  Prosthesis OS    Blindness  Va ADLs stable per patient    OCT RNFL OD ==> unable to obtain Valid signal     CCT  530 OD    Mid-low teens --> close // achieved & discussed    Right Eye -->Tolerating well &  confused --> CSM  "MTMT" MELISSA &written   Cosopt BID // TID  Alphagan BID // TID  Xal q day --> using BID --> q day  FML 5 days / week     --> consider Rocklatan    Giovani TID --> holding  Beni q day --> not covered    Diamox 250 BID --> Intol Hypo K --> now on K suppl -->  Holding  Tolerated well in past    Right eye MP3 / G6 Laser 2019     --> G-Probe --> reconsider repeat  Pre-Tx @ 27, 19, 24      PC IOL OD  Quiet  Observe    Monocular precautions    Prosthesis  Conj bed quiet 2019  fu with       Plan  RTC 4 months IOP & Adherence &  Photos  RTC sooner prn with good understanding           "

## 2025-07-23 DIAGNOSIS — I10 ESSENTIAL HYPERTENSION: ICD-10-CM

## 2025-07-23 RX ORDER — ATENOLOL AND CHLORTHALIDONE TABLET 50; 25 MG/1; MG/1
1 TABLET ORAL EVERY MORNING
Qty: 90 TABLET | Refills: 1 | Status: SHIPPED | OUTPATIENT
Start: 2025-07-23

## 2025-07-23 NOTE — TELEPHONE ENCOUNTER
Refill Decision Note   Yahaira Joya  is requesting a refill authorization.  Brief Assessment and Rationale for Refill:  Approve     Medication Therapy Plan:        Pharmacist review requested: Yes   Extended chart review required: Yes   Comments:     Note composed:4:12 PM 07/23/2025

## 2025-07-23 NOTE — TELEPHONE ENCOUNTER
No care due was identified.  [facility] Embedded Care Due Messages. Reference number: [id number].   7/23/2025 3:43:06 AM CDT

## 2025-07-23 NOTE — TELEPHONE ENCOUNTER
Refill Routing Note   Medication(s) are not appropriate for processing by [facility] for the following reason(s):     DDI not previously overridden by current provider--after initial override, the Refill Center will be able to continue overrides      Drug-disease interaction: atenoloL-chlorthalidone and Stage 3a chronic kidney disease     [facility] action(s):  Defer           Pharmacist review requested: Yes     Appointments  past 12m or future 3m with PCP    Date Provider   Last Visit   2/10/2025 Raul Adkins MD   Next Visit   8/11/2025 Raul Adkins MD   ED visits in past 90 days: 0        Note composed:3:12 PM 07/23/2025

## 2025-08-04 ENCOUNTER — PATIENT OUTREACH (OUTPATIENT)
Dept: ADMINISTRATIVE | Facility: HOSPITAL | Age: 75
End: 2025-08-04
Payer: MEDICARE

## 2025-08-04 ENCOUNTER — LAB VISIT (OUTPATIENT)
Dept: LAB | Facility: HOSPITAL | Age: 75
End: 2025-08-04
Attending: INTERNAL MEDICINE
Payer: MEDICARE

## 2025-08-04 ENCOUNTER — OFFICE VISIT (OUTPATIENT)
Dept: HEPATOLOGY | Facility: CLINIC | Age: 75
End: 2025-08-04
Payer: MEDICARE

## 2025-08-04 VITALS
BODY MASS INDEX: 29.67 KG/M2 | DIASTOLIC BLOOD PRESSURE: 82 MMHG | WEIGHT: 161.25 LBS | HEIGHT: 62 IN | HEART RATE: 76 BPM | SYSTOLIC BLOOD PRESSURE: 149 MMHG

## 2025-08-04 DIAGNOSIS — K76.9 LIVER DISEASE: ICD-10-CM

## 2025-08-04 DIAGNOSIS — K76.0 HEPATIC STEATOSIS: ICD-10-CM

## 2025-08-04 DIAGNOSIS — N18.31 STAGE 3A CHRONIC KIDNEY DISEASE: Primary | ICD-10-CM

## 2025-08-04 DIAGNOSIS — Z90.49 HISTORY OF CHOLECYSTECTOMY: ICD-10-CM

## 2025-08-04 DIAGNOSIS — R74.8 ABNORMAL LIVER ENZYMES: ICD-10-CM

## 2025-08-04 DIAGNOSIS — N18.31 STAGE 3A CHRONIC KIDNEY DISEASE: ICD-10-CM

## 2025-08-04 DIAGNOSIS — R10.9 ABDOMINAL PAIN, UNSPECIFIED ABDOMINAL LOCATION: ICD-10-CM

## 2025-08-04 DIAGNOSIS — E78.5 DYSLIPIDEMIA: Chronic | ICD-10-CM

## 2025-08-04 DIAGNOSIS — R79.89 ELEVATED LFTS: ICD-10-CM

## 2025-08-04 LAB
25(OH)D3+25(OH)D2 SERPL-MCNC: 26 NG/ML (ref 30–96)
ALBUMIN SERPL BCP-MCNC: 4.1 G/DL (ref 3.5–5.2)
ALBUMIN/CREAT UR: 16.1 UG/MG
ALP SERPL-CCNC: 151 UNIT/L (ref 40–150)
ALT SERPL W/O P-5'-P-CCNC: 39 UNIT/L (ref 0–55)
ANION GAP (OHS): 14 MMOL/L (ref 8–16)
AST SERPL-CCNC: 33 UNIT/L (ref 0–50)
BILIRUB DIRECT SERPL-MCNC: 0.3 MG/DL (ref 0.1–0.3)
BILIRUB SERPL-MCNC: 0.9 MG/DL (ref 0.1–1)
BUN SERPL-MCNC: 19 MG/DL (ref 8–23)
CALCIUM SERPL-MCNC: 9.8 MG/DL (ref 8.7–10.5)
CHLORIDE SERPL-SCNC: 102 MMOL/L (ref 95–110)
CHOLEST SERPL-MCNC: 182 MG/DL (ref 120–199)
CHOLEST/HDLC SERPL: 2.7 {RATIO} (ref 2–5)
CO2 SERPL-SCNC: 26 MMOL/L (ref 23–29)
CREAT SERPL-MCNC: 1.1 MG/DL (ref 0.5–1.4)
CREAT UR-MCNC: 211 MG/DL (ref 15–325)
ERYTHROCYTE [DISTWIDTH] IN BLOOD BY AUTOMATED COUNT: 15.8 % (ref 11.5–14.5)
GFR SERPLBLD CREATININE-BSD FMLA CKD-EPI: 53 ML/MIN/1.73/M2
GLUCOSE SERPL-MCNC: 122 MG/DL (ref 70–110)
HCT VFR BLD AUTO: 35.6 % (ref 37–48.5)
HDLC SERPL-MCNC: 68 MG/DL (ref 40–75)
HDLC SERPL: 37.4 % (ref 20–50)
HGB BLD-MCNC: 11 GM/DL (ref 12–16)
INR PPP: 1 (ref 0.8–1.2)
LDLC SERPL CALC-MCNC: 84.4 MG/DL (ref 63–159)
MCH RBC QN AUTO: 21.4 PG (ref 27–31)
MCHC RBC AUTO-ENTMCNC: 30.9 G/DL (ref 32–36)
MCV RBC AUTO: 69 FL (ref 82–98)
MICROALBUMIN UR-MCNC: 34 UG/ML (ref ?–5000)
NONHDLC SERPL-MCNC: 114 MG/DL
PHOSPHATE SERPL-MCNC: 3 MG/DL (ref 2.7–4.5)
PLATELET # BLD AUTO: 232 K/UL (ref 150–450)
PMV BLD AUTO: 9.8 FL (ref 9.2–12.9)
POTASSIUM SERPL-SCNC: 3 MMOL/L (ref 3.5–5.1)
PROT SERPL-MCNC: 7.7 GM/DL (ref 6–8.4)
PROTHROMBIN TIME: 10.6 SECONDS (ref 9–12.5)
PTH-INTACT SERPL-MCNC: 94.2 PG/ML (ref 9–77)
RBC # BLD AUTO: 5.14 M/UL (ref 4–5.4)
SODIUM SERPL-SCNC: 142 MMOL/L (ref 136–145)
TRIGL SERPL-MCNC: 148 MG/DL (ref 30–150)
WBC # BLD AUTO: 6.88 K/UL (ref 3.9–12.7)

## 2025-08-04 PROCEDURE — 3079F DIAST BP 80-89 MM HG: CPT | Mod: CPTII,S$GLB,, | Performed by: INTERNAL MEDICINE

## 2025-08-04 PROCEDURE — 1159F MED LIST DOCD IN RCRD: CPT | Mod: CPTII,S$GLB,, | Performed by: INTERNAL MEDICINE

## 2025-08-04 PROCEDURE — 3008F BODY MASS INDEX DOCD: CPT | Mod: CPTII,S$GLB,, | Performed by: INTERNAL MEDICINE

## 2025-08-04 PROCEDURE — 82465 ASSAY BLD/SERUM CHOLESTEROL: CPT

## 2025-08-04 PROCEDURE — 82570 ASSAY OF URINE CREATININE: CPT

## 2025-08-04 PROCEDURE — 84100 ASSAY OF PHOSPHORUS: CPT

## 2025-08-04 PROCEDURE — 82248 BILIRUBIN DIRECT: CPT

## 2025-08-04 PROCEDURE — 85610 PROTHROMBIN TIME: CPT

## 2025-08-04 PROCEDURE — 1101F PT FALLS ASSESS-DOCD LE1/YR: CPT | Mod: CPTII,S$GLB,, | Performed by: INTERNAL MEDICINE

## 2025-08-04 PROCEDURE — 99999 PR PBB SHADOW E&M-EST. PATIENT-LVL III: CPT | Mod: PBBFAC,,, | Performed by: INTERNAL MEDICINE

## 2025-08-04 PROCEDURE — 3061F NEG MICROALBUMINURIA REV: CPT | Mod: CPTII,S$GLB,, | Performed by: INTERNAL MEDICINE

## 2025-08-04 PROCEDURE — 99214 OFFICE O/P EST MOD 30 MIN: CPT | Mod: S$GLB,,, | Performed by: INTERNAL MEDICINE

## 2025-08-04 PROCEDURE — 85027 COMPLETE CBC AUTOMATED: CPT

## 2025-08-04 PROCEDURE — 83970 ASSAY OF PARATHORMONE: CPT

## 2025-08-04 PROCEDURE — 82310 ASSAY OF CALCIUM: CPT

## 2025-08-04 PROCEDURE — 3066F NEPHROPATHY DOC TX: CPT | Mod: CPTII,S$GLB,, | Performed by: INTERNAL MEDICINE

## 2025-08-04 PROCEDURE — 36415 COLL VENOUS BLD VENIPUNCTURE: CPT | Mod: PO

## 2025-08-04 PROCEDURE — 1160F RVW MEDS BY RX/DR IN RCRD: CPT | Mod: CPTII,S$GLB,, | Performed by: INTERNAL MEDICINE

## 2025-08-04 PROCEDURE — 3288F FALL RISK ASSESSMENT DOCD: CPT | Mod: CPTII,S$GLB,, | Performed by: INTERNAL MEDICINE

## 2025-08-04 PROCEDURE — 82306 VITAMIN D 25 HYDROXY: CPT

## 2025-08-04 PROCEDURE — 3077F SYST BP >= 140 MM HG: CPT | Mod: CPTII,S$GLB,, | Performed by: INTERNAL MEDICINE

## 2025-08-04 RX ORDER — URSODIOL 300 MG/1
300 CAPSULE ORAL 2 TIMES DAILY
Qty: 60 CAPSULE | Refills: 11 | Status: SHIPPED | OUTPATIENT
Start: 2025-08-04 | End: 2026-08-04

## 2025-08-04 NOTE — PROGRESS NOTES
Hepatology Follow Up Note    Referring provider: No ref. provider found  PCP: Raul Adkins MD    Chief complaint: follow up elevated liver enzymes     Last seen in clinic on: 9/16/2024    Brief HPI:  Yahaira Hinson is a 74 y.o. female with elevated liver enzymes, HTN, HLD, cholelithiasis and cholecystomy (1998), thalassemia, presents for scheduled follow up.    Interval Events:  - Accompanied by , Domingo.  - Feels well. Denies acute complaints.   - Has been taking UDCA and thinks it causes belching.   - The patient denies abdominal pain, nausea, vomiting, jaundice, scleral icterus, acholic stools.   - Per chart review, weight 148lb in Sept 2024 --> 161lb today.   - Denies recent ED visits, hospital admissions.  - This is the extent of the patient's complaints at this time.    Past Medical History:   Diagnosis Date    Cataract     Glaucoma (increased eye pressure)     H/O bilateral oophorectomy     Hx of cholecystectomy     Hypertension     Pure hypercholesterolemia     Thalassemia        Past Surgical History:   Procedure Laterality Date    BILATERAL OOPHORECTOMY      for cysts    CATARACT EXTRACTION Right     about 1998    CHOLECYSTECTOMY  1998    [place] REMOVAL OF OVARY/TUBE(S)      TRABECULECTOMY Right 6/20/2019    Procedure: G6/MP3 LASER;  Surgeon: Doroteo Beaver MD;  Location: [facility] OR [address] [place] [address];  Service: Ophthalmology;  Laterality: Right;       Family History   Problem Relation Name Age of Onset    Asthma Father      Arthritis Father      Ovarian cancer Mother      Hypertension Sister      No Known Problems Brother      Breast cancer Maternal Grandmother      No Known Problems Brother      No Known Problems Brother      Colon cancer Neg Hx         Social History     Tobacco Use    Smoking status: Former     Current packs/day: 0.00     Average packs/day: 0.3 packs/day for 11.0 years (3.3 ttl pk-yrs)     Types: Cigarettes     Start date: 8/7/1996     Quit date: 8/7/2007     Years since quitting:  "18.0    Smokeless tobacco: Never   Substance Use Topics    Alcohol use: Not Currently     Comment: social drinker    Drug use: No       Current Outpatient Medications   Medication Sig Dispense Refill    atenoloL-chlorthalidone (TENORETIC) 50-25 mg Tab TAKE 1 TABLET BY MOUTH EVERY MORNING 90 tablet 1    atorvastatin (LIPITOR) 10 MG tablet TAKE 1 TABLET(10 MG) BY MOUTH EVERY DAY 90 tablet 3    brimonidine 0.1% (ALPHAGAN P) 0.1 % Drop INSTILL ONE DROP IN BOTH EYES THREE TIMES DAILY 45 mL 4    dorzolamide-timolol 2-0.5% (COSOPT) 22.3-6.8 mg/mL ophthalmic solution INSTILL 1 DROP IN BOTH EYES THREE TIMES DAILY 30 mL 4    fluorometholone 0.1% (FML) 0.1 % DrpS Place 1 drop into both eyes Daily. 10 mL 6    latanoprost 0.005 % ophthalmic solution INSTILL 1 DROP IN BOTH EYES EVERY EVENING 7.5 mL 4    potassium chloride (KLOR-CON) 10 MEQ TbSR TAKE 1 TABLET(10 MEQ) BY MOUTH EVERY DAY 90 tablet 3    ursodioL (ACTIGALL) 300 mg capsule Take 1 capsule (300 mg total) by mouth 2 (two) times daily. 60 capsule 11     No current facility-administered medications for this visit.       Review of patient's allergies indicates:  No Known Allergies         Vitals:    08/04/25 1424   BP: (!) 149/82   Pulse: 76   Weight: 73.1 kg (161 lb 4.3 oz)   Height: 5' 2" (1.575 m)       Body mass index is 29.5 kg/m².    Physical Exam  Constitutional:       General: She is not in acute distress.  Eyes:      General: No scleral icterus.  Cardiovascular:      Rate and Rhythm: Normal rate.   Pulmonary:      Effort: Pulmonary effort is normal.   Abdominal:      General: Abdomen is flat. There is no distension.      Palpations: Abdomen is soft. There is no hepatomegaly or splenomegaly.      Tenderness: There is no abdominal tenderness.   Skin:     Coloration: Skin is not jaundiced.   Neurological:      General: No focal deficit present.      Mental Status: She is alert.   Psychiatric:         Thought Content: Thought content normal.       LABS: I personally " reviewed pertinent laboratory findings.    Lab Results   Component Value Date    WBC 6.88 08/04/2025    HGB 11.0 (L) 08/04/2025    HCT 35.6 (L) 08/04/2025    MCV 69 (L) 08/04/2025     08/04/2025       Lab Results   Component Value Date     08/04/2025    K 3.0 (L) 08/04/2025     08/04/2025    CO2 26 08/04/2025    BUN 19 08/04/2025    CREATININE 1.1 08/04/2025    CALCIUM 9.8 08/04/2025    ANIONGAP 14 08/04/2025    ESTGFRAFRICA 58.8 (A) 07/25/2022    EGFRNONAA 51.0 (A) 07/25/2022       Lab Results   Component Value Date    ALT 39 08/04/2025    AST 33 08/04/2025     (H) 12/09/2024    ALKPHOS 151 (H) 08/04/2025    BILITOT 0.9 08/04/2025       Lab Results   Component Value Date    HEPAIGM Non-reactive 04/30/2024    HEPBIGM Non-reactive 04/30/2024    HEPBCAB Non-reactive 06/17/2024    HEPCAB Non-reactive 04/30/2024       Lab Results   Component Value Date    SMOOTHMUSCAB Negative 1:40 04/30/2024    CERULOPLSM 33.0 04/30/2024        MELD 3.0: 8 at 8/4/2025  7:26 AM  MELD-Na: 7 at 8/4/2025  7:26 AM  Calculated from:  Serum Creatinine: 1.1 mg/dL at 8/4/2025  7:26 AM  Serum Sodium: 142 mmol/L (Using max of 137 mmol/L) at 8/4/2025  7:26 AM  Total Bilirubin: 0.9 mg/dL (Using min of 1 mg/dL) at 8/4/2025  7:26 AM  Serum Albumin: 4.1 g/dL (Using max of 3.5 g/dL) at 8/4/2025  7:26 AM  INR(ratio): 1 at 8/4/2025  7:26 AM  Age at listing (hypothetical): 74 years  Sex: Female at 8/4/2025  7:26 AM       IMAGING: I personally reviewed imaging studies.      Assessment:   Yahaira Hinson is a 74 y.o. female with abdominal pain and elevated liver enzymes, HTN, HLD, cholelithiasis and cholecystomy (1998), thalassemia,  presents for scheduled follow up. Per chart review, liver enzymes were previously elevated in a mixed pattern (ALP ~4x ULN, ALT ~6x ULN) in late April 2024 with improvement on recheck in the middle of May 2024. Workup was notable for DANIEL+, ASMA-, AMA-, and negative viral hepatitis panel. On US  (5/10/24), the liver is normal in size and contour with features of hepatic steatosis, no biliary dilation, and spleen is normal in size. On contrasted CT (5/9/24), fatty infiltration of the liver is seen, along with mild pneumobilia, normal spleen, no ascites. FibroScan (Sept 2024) suggests no hepatic fibrosis or steatosis.    Suspect de christopher stone formation / biliary sludge as the etiology of the patient's abdominal pain and abnormal liver enzymes. Fortunately, patient has not experienced recurrent of abdominal pain and seems to be tolerating UDCA relatively well.     1. Elevated LFTs    2. Hepatic steatosis    3. Abdominal pain, unspecified abdominal location    4. History of cholecystectomy    5. Liver disease      Recommendations:  - LFTs, INR, GTT in 6 months   - Continue UDCA 300mg BID  - Weight loss (goal 5-10% body weight)  - Mediterranean diet   - Avoid herbal supplements     Return to clinic in 12 months.    Nataliia Chen MD  Staff Physician  Hepatology and Liver Transplant  [facility]  [facility] [place]

## 2025-08-07 DIAGNOSIS — H40.1113 PRIMARY OPEN ANGLE GLAUCOMA (POAG) OF RIGHT EYE, SEVERE STAGE: ICD-10-CM

## 2025-08-07 RX ORDER — LATANOPROST 50 UG/ML
1 SOLUTION/ DROPS OPHTHALMIC NIGHTLY
Qty: 7.5 ML | Refills: 4 | Status: SHIPPED | OUTPATIENT
Start: 2025-08-07

## 2025-08-11 ENCOUNTER — OFFICE VISIT (OUTPATIENT)
Dept: FAMILY MEDICINE | Facility: CLINIC | Age: 75
End: 2025-08-11
Payer: MEDICARE

## 2025-08-11 VITALS
WEIGHT: 161.25 LBS | SYSTOLIC BLOOD PRESSURE: 106 MMHG | HEIGHT: 62 IN | DIASTOLIC BLOOD PRESSURE: 70 MMHG | OXYGEN SATURATION: 97 % | TEMPERATURE: 98 F | BODY MASS INDEX: 29.67 KG/M2 | HEART RATE: 65 BPM

## 2025-08-11 DIAGNOSIS — D56.9 THALASSEMIA, UNSPECIFIED TYPE: ICD-10-CM

## 2025-08-11 DIAGNOSIS — I10 ESSENTIAL HYPERTENSION: Primary | Chronic | ICD-10-CM

## 2025-08-11 DIAGNOSIS — E78.5 DYSLIPIDEMIA: Chronic | ICD-10-CM

## 2025-08-11 DIAGNOSIS — E87.6 DIURETIC-INDUCED HYPOKALEMIA: ICD-10-CM

## 2025-08-11 DIAGNOSIS — R74.01 TRANSAMINITIS: ICD-10-CM

## 2025-08-11 DIAGNOSIS — E80.6 HYPERBILIRUBINEMIA: ICD-10-CM

## 2025-08-11 DIAGNOSIS — M85.89 OSTEOPENIA OF MULTIPLE SITES: ICD-10-CM

## 2025-08-11 DIAGNOSIS — N18.31 STAGE 3A CHRONIC KIDNEY DISEASE: Chronic | ICD-10-CM

## 2025-08-11 DIAGNOSIS — T50.2X5A DIURETIC-INDUCED HYPOKALEMIA: ICD-10-CM

## 2025-08-11 PROCEDURE — G2211 COMPLEX E/M VISIT ADD ON: HCPCS | Mod: S$GLB,,, | Performed by: INTERNAL MEDICINE

## 2025-08-11 PROCEDURE — 1101F PT FALLS ASSESS-DOCD LE1/YR: CPT | Mod: CPTII,S$GLB,, | Performed by: INTERNAL MEDICINE

## 2025-08-11 PROCEDURE — 3078F DIAST BP <80 MM HG: CPT | Mod: CPTII,S$GLB,, | Performed by: INTERNAL MEDICINE

## 2025-08-11 PROCEDURE — 3074F SYST BP LT 130 MM HG: CPT | Mod: CPTII,S$GLB,, | Performed by: INTERNAL MEDICINE

## 2025-08-11 PROCEDURE — 3066F NEPHROPATHY DOC TX: CPT | Mod: CPTII,S$GLB,, | Performed by: INTERNAL MEDICINE

## 2025-08-11 PROCEDURE — 3288F FALL RISK ASSESSMENT DOCD: CPT | Mod: CPTII,S$GLB,, | Performed by: INTERNAL MEDICINE

## 2025-08-11 PROCEDURE — 99214 OFFICE O/P EST MOD 30 MIN: CPT | Mod: S$GLB,,, | Performed by: INTERNAL MEDICINE

## 2025-08-11 PROCEDURE — 1160F RVW MEDS BY RX/DR IN RCRD: CPT | Mod: CPTII,S$GLB,, | Performed by: INTERNAL MEDICINE

## 2025-08-11 PROCEDURE — 1159F MED LIST DOCD IN RCRD: CPT | Mod: CPTII,S$GLB,, | Performed by: INTERNAL MEDICINE

## 2025-08-11 PROCEDURE — 3008F BODY MASS INDEX DOCD: CPT | Mod: CPTII,S$GLB,, | Performed by: INTERNAL MEDICINE

## 2025-08-11 PROCEDURE — 99999 PR PBB SHADOW E&M-EST. PATIENT-LVL IV: CPT | Mod: PBBFAC,,, | Performed by: INTERNAL MEDICINE

## 2025-08-11 PROCEDURE — 3061F NEG MICROALBUMINURIA REV: CPT | Mod: CPTII,S$GLB,, | Performed by: INTERNAL MEDICINE

## 2025-08-11 PROCEDURE — 1126F AMNT PAIN NOTED NONE PRSNT: CPT | Mod: CPTII,S$GLB,, | Performed by: INTERNAL MEDICINE

## 2025-08-11 RX ORDER — POTASSIUM CHLORIDE 1500 MG/1
20 TABLET, EXTENDED RELEASE ORAL DAILY
Qty: 90 TABLET | Refills: 3 | Status: SHIPPED | OUTPATIENT
Start: 2025-08-11

## 2025-08-26 ENCOUNTER — PATIENT OUTREACH (OUTPATIENT)
Dept: ADMINISTRATIVE | Facility: HOSPITAL | Age: 75
End: 2025-08-26
Payer: MEDICARE

## 2025-08-26 ENCOUNTER — CLINICAL SUPPORT (OUTPATIENT)
Dept: ENDOSCOPY | Facility: HOSPITAL | Age: 75
End: 2025-08-26
Attending: INTERNAL MEDICINE
Payer: MEDICARE

## 2025-08-26 ENCOUNTER — LAB VISIT (OUTPATIENT)
Dept: LAB | Facility: HOSPITAL | Age: 75
End: 2025-08-26
Attending: INTERNAL MEDICINE
Payer: MEDICARE

## 2025-08-26 DIAGNOSIS — Z12.11 COLON CANCER SCREENING: Primary | ICD-10-CM

## 2025-08-26 DIAGNOSIS — Z12.11 SCREENING FOR COLORECTAL CANCER: Primary | ICD-10-CM

## 2025-08-26 DIAGNOSIS — Z12.12 SCREENING FOR COLORECTAL CANCER: Primary | ICD-10-CM

## 2025-08-27 ENCOUNTER — TELEPHONE (OUTPATIENT)
Dept: FAMILY MEDICINE | Facility: CLINIC | Age: 75
End: 2025-08-27
Payer: MEDICARE

## (undated) DEVICE — SHIELD COLLAGEN 12HR CORNEAL

## (undated) DEVICE — NDL BOX COUNTER

## (undated) DEVICE — SUT 7/0 18IN COATED VICRYL

## (undated) DEVICE — SUT 8-0 8 COATED VICRYL VI

## (undated) DEVICE — SEE MEDLINE ITEM 157131

## (undated) DEVICE — SPONGE WEC CEL SPEARS

## (undated) DEVICE — NDL HYPO A BEVEL 30X1/2

## (undated) DEVICE — KIT MEROCEL INSTRUMENT WIPE

## (undated) DEVICE — SUT 6/0 18IN COATED VICRYL

## (undated) DEVICE — SET BLD COL SAFETY 23G 3/4 LL

## (undated) DEVICE — NDL FLTR 5MCRN BLNT TIP 18GX1

## (undated) DEVICE — SOL BETADINE 5%

## (undated) DEVICE — DRAPE STERI INCISE MED 130X130